# Patient Record
Sex: FEMALE | Race: AMERICAN INDIAN OR ALASKA NATIVE | HISPANIC OR LATINO | Employment: UNEMPLOYED | URBAN - METROPOLITAN AREA
[De-identification: names, ages, dates, MRNs, and addresses within clinical notes are randomized per-mention and may not be internally consistent; named-entity substitution may affect disease eponyms.]

---

## 2017-01-26 DIAGNOSIS — Z32.01 PREGNANCY TEST POSITIVE: Primary | ICD-10-CM

## 2019-04-15 ENCOUNTER — HOSPITAL ENCOUNTER (EMERGENCY)
Facility: CLINIC | Age: 29
Discharge: HOME | End: 2019-04-15
Attending: EMERGENCY MEDICINE | Admitting: EMERGENCY MEDICINE

## 2019-04-15 ENCOUNTER — APPOINTMENT (OUTPATIENT)
Dept: GENERAL RADIOLOGY | Facility: CLINIC | Age: 29
End: 2019-04-15
Attending: EMERGENCY MEDICINE

## 2019-04-15 VITALS
RESPIRATION RATE: 20 BRPM | OXYGEN SATURATION: 100 % | BODY MASS INDEX: 24.54 KG/M2 | HEIGHT: 60 IN | WEIGHT: 125 LBS | TEMPERATURE: 98.4 F | HEART RATE: 85 BPM | SYSTOLIC BLOOD PRESSURE: 109 MMHG | DIASTOLIC BLOOD PRESSURE: 67 MMHG

## 2019-04-15 DIAGNOSIS — M79.605 PAIN OF LEFT LOWER EXTREMITY: ICD-10-CM

## 2019-04-15 DIAGNOSIS — M05.79 RHEUMATOID ARTHRITIS INVOLVING MULTIPLE SITES WITH POSITIVE RHEUMATOID FACTOR (H): ICD-10-CM

## 2019-04-15 DIAGNOSIS — M25.552 HIP PAIN, LEFT: ICD-10-CM

## 2019-04-15 LAB
ANION GAP SERPL CALCULATED.3IONS-SCNC: 6 MMOL/L (ref 3–14)
BASOPHILS # BLD AUTO: 0 10E9/L (ref 0–0.2)
BASOPHILS NFR BLD AUTO: 0.3 %
BUN SERPL-MCNC: 14 MG/DL (ref 7–30)
CALCIUM SERPL-MCNC: 9.4 MG/DL (ref 8.5–10.1)
CHLORIDE SERPL-SCNC: 108 MMOL/L (ref 94–109)
CO2 SERPL-SCNC: 25 MMOL/L (ref 20–32)
CREAT SERPL-MCNC: 0.53 MG/DL (ref 0.52–1.04)
CRP SERPL-MCNC: 47.2 MG/L (ref 0–8)
DIFFERENTIAL METHOD BLD: NORMAL
EOSINOPHIL # BLD AUTO: 0 10E9/L (ref 0–0.7)
EOSINOPHIL NFR BLD AUTO: 0.6 %
ERYTHROCYTE [DISTWIDTH] IN BLOOD BY AUTOMATED COUNT: 12.3 % (ref 10–15)
ERYTHROCYTE [SEDIMENTATION RATE] IN BLOOD BY WESTERGREN METHOD: 65 MM/H (ref 0–20)
GFR SERPL CREATININE-BSD FRML MDRD: >90 ML/MIN/{1.73_M2}
GLUCOSE SERPL-MCNC: 118 MG/DL (ref 70–99)
HCT VFR BLD AUTO: 38.5 % (ref 35–47)
HGB BLD-MCNC: 12.8 G/DL (ref 11.7–15.7)
IMM GRANULOCYTES # BLD: 0 10E9/L (ref 0–0.4)
IMM GRANULOCYTES NFR BLD: 0.2 %
LYMPHOCYTES # BLD AUTO: 1.6 10E9/L (ref 0.8–5.3)
LYMPHOCYTES NFR BLD AUTO: 25.7 %
MCH RBC QN AUTO: 29.6 PG (ref 26.5–33)
MCHC RBC AUTO-ENTMCNC: 33.2 G/DL (ref 31.5–36.5)
MCV RBC AUTO: 89 FL (ref 78–100)
MONOCYTES # BLD AUTO: 0.2 10E9/L (ref 0–1.3)
MONOCYTES NFR BLD AUTO: 3.6 %
NEUTROPHILS # BLD AUTO: 4.5 10E9/L (ref 1.6–8.3)
NEUTROPHILS NFR BLD AUTO: 69.6 %
NRBC # BLD AUTO: 0 10*3/UL
NRBC BLD AUTO-RTO: 0 /100
PLATELET # BLD AUTO: 322 10E9/L (ref 150–450)
POTASSIUM SERPL-SCNC: 3.9 MMOL/L (ref 3.4–5.3)
RBC # BLD AUTO: 4.32 10E12/L (ref 3.8–5.2)
SODIUM SERPL-SCNC: 139 MMOL/L (ref 133–144)
WBC # BLD AUTO: 6.4 10E9/L (ref 4–11)

## 2019-04-15 PROCEDURE — 86140 C-REACTIVE PROTEIN: CPT | Performed by: EMERGENCY MEDICINE

## 2019-04-15 PROCEDURE — 80048 BASIC METABOLIC PNL TOTAL CA: CPT | Performed by: EMERGENCY MEDICINE

## 2019-04-15 PROCEDURE — 73502 X-RAY EXAM HIP UNI 2-3 VIEWS: CPT

## 2019-04-15 PROCEDURE — 99285 EMERGENCY DEPT VISIT HI MDM: CPT

## 2019-04-15 PROCEDURE — 85025 COMPLETE CBC W/AUTO DIFF WBC: CPT | Performed by: EMERGENCY MEDICINE

## 2019-04-15 PROCEDURE — 25000132 ZZH RX MED GY IP 250 OP 250 PS 637: Performed by: EMERGENCY MEDICINE

## 2019-04-15 PROCEDURE — 85652 RBC SED RATE AUTOMATED: CPT | Performed by: EMERGENCY MEDICINE

## 2019-04-15 PROCEDURE — 71101 X-RAY EXAM UNILAT RIBS/CHEST: CPT | Mod: RT

## 2019-04-15 RX ORDER — METHYLPREDNISOLONE 4 MG
TABLET, DOSE PACK ORAL
Qty: 21 TABLET | Refills: 0 | Status: SHIPPED | OUTPATIENT
Start: 2019-04-15 | End: 2023-02-01

## 2019-04-15 RX ORDER — IBUPROFEN 600 MG/1
600 TABLET, FILM COATED ORAL ONCE
Status: COMPLETED | OUTPATIENT
Start: 2019-04-15 | End: 2019-04-15

## 2019-04-15 RX ORDER — IBUPROFEN 800 MG/1
800 TABLET, FILM COATED ORAL EVERY 8 HOURS PRN
Qty: 60 TABLET | Refills: 0 | Status: SHIPPED | OUTPATIENT
Start: 2019-04-15 | End: 2019-04-23

## 2019-04-15 RX ADMIN — IBUPROFEN 600 MG: 600 TABLET ORAL at 20:30

## 2019-04-15 ASSESSMENT — ENCOUNTER SYMPTOMS
CHILLS: 1
BACK PAIN: 1
FEVER: 0
MYALGIAS: 1
ARTHRALGIAS: 1

## 2019-04-15 ASSESSMENT — MIFFLIN-ST. JEOR: SCORE: 1228.5

## 2019-04-15 NOTE — ED AVS SNAPSHOT
Emergency Department  [address] [facility] [address]  Phone:  [phone]  Fax:  [phone]                                    Adelita Bosch   MRN: [id number]    Department:   Emergency Department   Date of Visit:  4/15/2019           After Visit Summary Signature Page    I have received my discharge instructions, and my questions have been answered. I have discussed any challenges I see with this plan with the nurse or doctor.    ..........................................................................................................................................  Patient/Patient Representative Signature      ..........................................................................................................................................  Patient Representative Print Name and Relationship to Patient    ..................................................               ................................................  Date                                   Time    ..........................................................................................................................................  Reviewed by Signature/Title    ...................................................              ..............................................  Date                                               Time          22EPIC Rev 08/18

## 2019-04-15 NOTE — ED TRIAGE NOTES
Started a couple weeks ago. Hx of RA, pain getting worse, unable to ambulated States pain goes from left knee up into hip and lower back

## 2019-04-16 NOTE — ED PROVIDER NOTES
History     Chief Complaint:  Leg Pain     HPI   Adelita Bosch is a 28 year old female with a history of rheumatoid arthritis who presents with a male  to the Emergency Department today for evaluation of left leg pain for the last couple of weeks. Patient reports she initially attributed her pain to her rheumatoid arthritis and has been taking ibuprofen. The pain is worse today. Her leg is painful from her left knee to her left hip. The pain radiates into her lower back. She reports chills but has no fever. She denies injury or lifting with her back. She last took ibuprofen at 2 PM. She reports she has not seen her physician in a while because of insurance issues.     Allergies:  No known drug allergies    Medications:    Plaquenil  Prednisone  Folic acid  Methotrexate     Past Medical History:    Rheumatoid arthritis   Oligohydramnios  Sjogren's syndrome  TB lung  Osteoarthritis  Breast mass    Past Surgical History:     section    Family History:    Breast cancer    Social History:  Smoking Status: No  Smokeless Tobacco: N/A  Alcohol Use: Yes  Drug Use: No   Marital Status:   [2]     Review of Systems   Constitutional: Positive for chills. Negative for fever.   Musculoskeletal: Positive for arthralgias, back pain and myalgias.   All other systems reviewed and are negative.      Physical Exam   Vitals:  Patient Vitals for the past 24 hrs:   BP Temp Temp src Pulse Heart Rate Resp SpO2 Height Weight   04/15/19 2030 109/67 -- -- 76 -- -- 94 % -- --   04/15/19 1804 111/63 98.4  F (36.9  C) Oral -- 98 16 99 % 1.524 m (5') 56.7 kg (125 lb)      Physical Exam  General: Patient is alert and normal appearing.  HEENT: Head atraumatic    Eyes: pupils equal and reactive. Conjunctiva clear   Nares: patent   Oropharynx: no lesions, uvula midline, no palatal draping, normal voice, no trismus  Neck: Supple without lymphadenopathy, no meningismus  Chest: Heart regular rate and rhythm.   Lungs: Equal  clear to auscultation with no wheeze or rales  Abdomen: Soft, non tender, nondistended, normal bowel sounds  Back: No costovertebral angle tenderness, no midline C, T or L spine tenderness  Neuro: Grossly nonfocal, normal speech, strength equal bilaterally, CN 2-12 intact  Extremities: No deformities, equal radial and DP pulses. No clubbing, cyanosis.  No edema.  Left hip tenderness to palpation, no erythema or warmth, pain with left leg log roll.  Left leg is not swollen, not erythematous, no tenderness of her calf.  Compartments are soft.  Left foot is warm and well perfused with 2+ DP and PT pulse.  No midline lumbar tenderness to palpation or SI joint tenderness to palpation.  Skin: Warm and dry with no rash.       Emergency Department Course     Imaging:  Radiology findings were communicated with the patient who voiced understanding of the findings.  XR Pelvis with Hip Left 1 View  IMPRESSION:   1. No fractures are identified.  2. Joint space narrowing left hip joint consistent with degenerative  change. As read by radiology.  Ribs XR, unilat 3 views + PA chest, right  IMPRESSION: No rib fracture demonstrated. As read by radiology.    Laboratory:  Laboratory findings were communicated with the patient who voiced understanding of the findings.  CBC: WNL (WBC 6.4, HGB 12.8, )  BMP: Glucose 118 (H) o/w WNL (Creatinine 0.53)    CRP inflammation: 47.2 (H)  Erythrocyte sedimentation rate: 65 (H)    Interventions:  2030: Ibuprofen 600 MG PO     Emergency Department Course:  Past medical records, nursing notes, and vitals reviewed.  2020: I performed an exam of the patient and obtained history, as documented above.    IV inserted and blood drawn.    The patient was sent for a pelvis/left hip x-ray and ribs x-ray while in the emergency department, findings above.    2115: I rechecked the patient. Explained findings to the patient.    Findings and plan explained to the Patient. Patient discharged home with  instructions regarding supportive care, medications, and reasons to return. The importance of close follow-up was reviewed.     Impression & Plan      Medical Decision Making:  Patient is a 28-year-old female with history of rheumatoid arthritis off treatment for many months.  She has had increasing left hip pain radiating to her left knee over the last several days.  Will improve with ibuprofen or Aleve but then recurs.  She admitted chills but denied fever.  Here she is hemodynamically stable and afebrile.  There is no signs or symptoms to suggest infection or septic joint at this time.  Her ESR and CRP are elevated but I feel this is likely due to her rheumatoid arthritis and not septic joint.  X-ray reveals degenerative changes of her left hip.  She also complained of some right rib tenderness but there is no fracture seen on x-ray.  She has no signs or symptoms to suggest PE including no pleurisy.  She has reproducible tenderness over the right ribs.  No right upper quadrant or abdominal tenderness to palpation.  Patient denied dysuria, urgency, frequency and I do not suspect urinary tract infection or kidney stone as a cause of her symptoms.  Do not feel the patient will be appropriate to tap her joint at this time.  Recommend follow-up with rheumatology and she is getting insurance put back into Place May first.  I have given her follow-up information and told her to call to get an appointment as it may take several weeks.  In addition we will place her on a Medrol Dosepak to help with inflammation and arthritis.  I have asked her to use scheduled ibuprofen as well.  Return precautions the emergency department were reviewed at length and all questions and concerns addressed.    Diagnosis:    ICD-10-CM   1. Hip pain, left M25.552   2. Pain of left lower extremity M79.605   3. Rheumatoid arthritis involving multiple sites with positive rheumatoid factor (H) M05.79        Disposition:   Discharged to  home    Discharge Medications:       Medication List      Started    ibuprofen 800 MG tablet  Commonly known as:  ADVIL/MOTRIN  800 mg, Oral, EVERY 8 HOURS PRN     methylPREDNISolone 4 MG tablet therapy pack  Commonly known as:  MEDROL DOSEPAK  Follow Package Directions            4/15/2019    EMERGENCY DEPARTMENT  Scribe Disclosure:  I, Theresa Hinds, am serving as a scribe at 8:20 PM on 4/15/2019 to document services personally performed by Juliet Partida MD based on my observations and the provider's statements to me.        Juliet Partida MD  04/15/19 5440

## 2021-03-16 ENCOUNTER — TRANSFERRED RECORDS (OUTPATIENT)
Dept: MULTI SPECIALTY CLINIC | Facility: CLINIC | Age: 31
End: 2021-03-16

## 2021-03-16 LAB — PAP-ABSTRACT: NORMAL

## 2022-02-22 ENCOUNTER — HOSPITAL ENCOUNTER (EMERGENCY)
Facility: CLINIC | Age: 32
Discharge: HOME | End: 2022-02-22
Attending: EMERGENCY MEDICINE | Admitting: EMERGENCY MEDICINE
Payer: MEDICAID

## 2022-02-22 VITALS
TEMPERATURE: 98 F | RESPIRATION RATE: 16 BRPM | BODY MASS INDEX: 27.77 KG/M2 | OXYGEN SATURATION: 98 % | HEART RATE: 65 BPM | DIASTOLIC BLOOD PRESSURE: 71 MMHG | WEIGHT: 142.2 LBS | SYSTOLIC BLOOD PRESSURE: 109 MMHG

## 2022-02-22 DIAGNOSIS — F41.0 ANXIETY ATTACK: ICD-10-CM

## 2022-02-22 DIAGNOSIS — R20.2 PARESTHESIAS: ICD-10-CM

## 2022-02-22 LAB
ANION GAP SERPL CALCULATED.3IONS-SCNC: 6 MMOL/L (ref 3–14)
ATRIAL RATE - MUSE: 86 BPM
BASOPHILS # BLD AUTO: 0.1 10E3/UL (ref 0–0.2)
BASOPHILS NFR BLD AUTO: 1 %
BUN SERPL-MCNC: 11 MG/DL (ref 7–30)
CALCIUM SERPL-MCNC: 9.2 MG/DL (ref 8.5–10.1)
CHLORIDE BLD-SCNC: 110 MMOL/L (ref 94–109)
CO2 SERPL-SCNC: 24 MMOL/L (ref 20–32)
CREAT SERPL-MCNC: 0.7 MG/DL (ref 0.52–1.04)
DIASTOLIC BLOOD PRESSURE - MUSE: NORMAL MMHG
EOSINOPHIL # BLD AUTO: 0.3 10E3/UL (ref 0–0.7)
EOSINOPHIL NFR BLD AUTO: 3 %
ERYTHROCYTE [DISTWIDTH] IN BLOOD BY AUTOMATED COUNT: 12.2 % (ref 10–15)
GFR SERPL CREATININE-BSD FRML MDRD: >90 ML/MIN/1.73M2
GLUCOSE BLD-MCNC: 102 MG/DL (ref 70–99)
GLUCOSE BLDC GLUCOMTR-MCNC: 100 MG/DL (ref 70–99)
HCT VFR BLD AUTO: 44.7 % (ref 35–47)
HGB BLD-MCNC: 15.5 G/DL (ref 11.7–15.7)
IMM GRANULOCYTES # BLD: 0 10E3/UL
IMM GRANULOCYTES NFR BLD: 0 %
INTERPRETATION ECG - MUSE: NORMAL
LYMPHOCYTES # BLD AUTO: 4 10E3/UL (ref 0.8–5.3)
LYMPHOCYTES NFR BLD AUTO: 42 %
MAGNESIUM SERPL-MCNC: 2.2 MG/DL (ref 1.6–2.3)
MCH RBC QN AUTO: 33.3 PG (ref 26.5–33)
MCHC RBC AUTO-ENTMCNC: 34.7 G/DL (ref 31.5–36.5)
MCV RBC AUTO: 96 FL (ref 78–100)
MONOCYTES # BLD AUTO: 0.7 10E3/UL (ref 0–1.3)
MONOCYTES NFR BLD AUTO: 7 %
NEUTROPHILS # BLD AUTO: 4.5 10E3/UL (ref 1.6–8.3)
NEUTROPHILS NFR BLD AUTO: 47 %
NRBC # BLD AUTO: 0 10E3/UL
NRBC BLD AUTO-RTO: 0 /100
P AXIS - MUSE: 57 DEGREES
PLATELET # BLD AUTO: 182 10E3/UL (ref 150–450)
POTASSIUM BLD-SCNC: 3.4 MMOL/L (ref 3.4–5.3)
PR INTERVAL - MUSE: 152 MS
QRS DURATION - MUSE: 70 MS
QT - MUSE: 368 MS
QTC - MUSE: 440 MS
R AXIS - MUSE: 47 DEGREES
RBC # BLD AUTO: 4.66 10E6/UL (ref 3.8–5.2)
SODIUM SERPL-SCNC: 140 MMOL/L (ref 133–144)
SYSTOLIC BLOOD PRESSURE - MUSE: NORMAL MMHG
T AXIS - MUSE: 33 DEGREES
VENTRICULAR RATE- MUSE: 86 BPM
WBC # BLD AUTO: 9.5 10E3/UL (ref 4–11)

## 2022-02-22 PROCEDURE — 96374 THER/PROPH/DIAG INJ IV PUSH: CPT

## 2022-02-22 PROCEDURE — 83735 ASSAY OF MAGNESIUM: CPT | Performed by: EMERGENCY MEDICINE

## 2022-02-22 PROCEDURE — 99285 EMERGENCY DEPT VISIT HI MDM: CPT | Mod: 25

## 2022-02-22 PROCEDURE — 93005 ELECTROCARDIOGRAM TRACING: CPT

## 2022-02-22 PROCEDURE — 250N000011 HC RX IP 250 OP 636: Performed by: EMERGENCY MEDICINE

## 2022-02-22 PROCEDURE — 36415 COLL VENOUS BLD VENIPUNCTURE: CPT | Performed by: EMERGENCY MEDICINE

## 2022-02-22 PROCEDURE — 80048 BASIC METABOLIC PNL TOTAL CA: CPT | Performed by: EMERGENCY MEDICINE

## 2022-02-22 PROCEDURE — 85025 COMPLETE CBC W/AUTO DIFF WBC: CPT | Performed by: EMERGENCY MEDICINE

## 2022-02-22 RX ORDER — HYDROXYZINE HYDROCHLORIDE 25 MG/1
25-50 TABLET, FILM COATED ORAL 3 TIMES DAILY PRN
Qty: 20 TABLET | Refills: 0 | Status: SHIPPED | OUTPATIENT
Start: 2022-02-22 | End: 2023-02-01

## 2022-02-22 RX ORDER — LORAZEPAM 2 MG/ML
1 INJECTION INTRAMUSCULAR ONCE
Status: COMPLETED | OUTPATIENT
Start: 2022-02-22 | End: 2022-02-22

## 2022-02-22 RX ADMIN — LORAZEPAM 1 MG: 2 INJECTION INTRAMUSCULAR; INTRAVENOUS at 02:58

## 2022-02-22 ASSESSMENT — ENCOUNTER SYMPTOMS
ARTHRALGIAS: 0
SHORTNESS OF BREATH: 1
LIGHT-HEADEDNESS: 1
WEAKNESS: 1
MYALGIAS: 0
NUMBNESS: 1
TREMORS: 1

## 2022-02-22 NOTE — ED TRIAGE NOTES
Pt arrives to the ED due to numbness that started in right hand around 2300. Pt states that then when she was trying to sleep she felt tingling/numbness in both sides of face. Pt states her legs also feel wobbly/shaky. Pt AOx4. Denies headache.

## 2022-02-22 NOTE — ED PROVIDER NOTES
History   Chief Complaint:  Numbness     The history is provided by the patient.      Adelita Bosch is a 31 year old female with history of rheumatoid arthritis who presents with numbness. Patient developed numbness to both sides of her face. It initially started around 2300 in her right hand that radiated up to her elbow. When she lay down to sleep, her head and face became numb. She notes numbness in her legs and feet as well. She feels lightheaded and short of breath. No history of similar presentation. Denies any pain. No recent medication changes.     Review of Systems   Respiratory: Positive for shortness of breath.    Musculoskeletal: Negative for arthralgias and myalgias.   Neurological: Positive for tremors, weakness, light-headedness and numbness.   All other systems reviewed and are negative.    Allergies:  The patient has no known allergies.     Medications:  Prilosec  Humira   Prednisone   Plaquenil     Past Medical History:     Latent tuberculosis  Rheumatoid arthritis   Breast mass   Sjogren's syndrome     Past Surgical History:     section     Family History:    Mother: breast cancer    Social History:  Presents to ED with visitor     Physical Exam     Patient Vitals for the past 24 hrs:   BP Temp Temp src Pulse Resp SpO2 Weight   22 0238 132/82 98  F (36.7  C) Temporal 96 18 100 % 64.5 kg (142 lb 3.2 oz)       Physical Exam  Nursing note and vitals reviewed.  HENT:   Mouth/Throat: Moist mucous membranes.   Eyes: nonicteric sclera  Cardiovascular: Normal rate, regular rhythm, no murmurs, rubs, or gallops  Pulmonary/Chest: Effort normal and breath sounds normal. No respiratory distress. No wheezes. No rales.   Abdominal: Soft. Nontender, nondistended, no guarding or rigidity.   Musculoskeletal: Normal range of motion.   Neurological: Alert. Moves all extremities spontaneously. Pt tremulous.    CN's II-XII intact. 5/5 BUE and BLE strength. PERRL    EOMI without nystagmus.       Sensation intact to light touch.   Skin: Skin is warm and dry. No rash noted.   Psychiatric: Appears anxious, hyperventilating. Congruent affect.    Emergency Department Course   ECG  ECG obtained at 0243, ECG read at 0253  Normal sinus rhythm  Poor image quality due to tremors   Rate 86 bpm. NY interval 152 ms. QRS duration 70 ms. QT/QTc 368/440 ms. P-R-T axes 57 47 33.     Laboratory:  Labs Ordered and Resulted from Time of ED Arrival to Time of ED Departure   GLUCOSE BY METER - Abnormal       Result Value    GLUCOSE BY METER POCT 100 (*)    BASIC METABOLIC PANEL - Abnormal    Sodium 140      Potassium 3.4      Chloride 110 (*)     Carbon Dioxide (CO2) 24      Anion Gap 6      Urea Nitrogen 11      Creatinine 0.70      Calcium 9.2      Glucose 102 (*)     GFR Estimate >90     CBC WITH PLATELETS AND DIFFERENTIAL - Abnormal    WBC Count 9.5      RBC Count 4.66      Hemoglobin 15.5      Hematocrit 44.7      MCV 96      MCH 33.3 (*)     MCHC 34.7      RDW 12.2      Platelet Count 182      % Neutrophils 47      % Lymphocytes 42      % Monocytes 7      % Eosinophils 3      % Basophils 1      % Immature Granulocytes 0      NRBCs per 100 WBC 0      Absolute Neutrophils 4.5      Absolute Lymphocytes 4.0      Absolute Monocytes 0.7      Absolute Eosinophils 0.3      Absolute Basophils 0.1      Absolute Immature Granulocytes 0.0      Absolute NRBCs 0.0     MAGNESIUM - Normal    Magnesium 2.2        Emergency Department Course:     Reviewed:  I reviewed nursing notes, vitals, past medical history and Care Everywhere    Assessments:  0241 I obtained history and examined the patient as noted above.   0515 I rechecked the patient and explained findings.     Interventions:  0258 Ativan 1 mg IV     Disposition:  The patient was discharged to home.     Impression & Plan       Medical Decision Making:  Pt presents with CC paresthesias/full body numbness. She is shaking and hyperventilating on exam. While this was consistent with  an anxiety attack, I did consider other etiologies such as PE, MS, CVA, among others. Workup is negative. Pt is PERC negative, therefore no further PE workup indicated. After dose of ativan, pt notes her symptoms are completely improved. Chart review suggested a similar presentation about 1 year prior. Pt reports somewhat frequent symptoms that certainly could be related to anxiety. She was amenable to trying hydroxyzine. I encouraged her to schedule with her primary care physician for further evaluation/treatment in about a week - sooner if worsening symptoms. She is in stable condition at the time of discharge, indications for return to the ED were discussed as well as follow up. All questions were answered and she is in agreement with the plan.       Diagnosis:    ICD-10-CM    1. Paresthesias  R20.2    2. Anxiety attack  F41.0        Discharge Medications:  New Prescriptions    HYDROXYZINE (ATARAX) 25 MG TABLET    Take 1-2 tablets (25-50 mg) by mouth 3 times daily as needed for anxiety       Scribe Disclosure:  I, Alberto Clifton, am serving as a scribe at 2:39 AM on 2/22/2022 to document services personally performed by Garfield Marx MD based on my observations and the provider's statements to me.            Garfield Marx MD  02/23/22 0031

## 2022-02-22 NOTE — DISCHARGE INSTRUCTIONS
Your symptoms tonight were very likely due to an anxiety attack. Nevertheless, you should follow-up with your primary care doctor this week for further discussion of your symptoms and any further testing they recommend.     Return to emergency department for chest pain, shortness of breath, loss of consciousness/passing out, or for any other concerns.

## 2023-01-15 ENCOUNTER — APPOINTMENT (OUTPATIENT)
Dept: CT IMAGING | Facility: CLINIC | Age: 33
End: 2023-01-15
Attending: EMERGENCY MEDICINE
Payer: MEDICAID

## 2023-01-15 ENCOUNTER — APPOINTMENT (OUTPATIENT)
Dept: GENERAL RADIOLOGY | Facility: CLINIC | Age: 33
End: 2023-01-15
Attending: EMERGENCY MEDICINE
Payer: MEDICAID

## 2023-01-15 ENCOUNTER — HOSPITAL ENCOUNTER (EMERGENCY)
Facility: CLINIC | Age: 33
Discharge: HOME | End: 2023-01-15
Attending: EMERGENCY MEDICINE | Admitting: EMERGENCY MEDICINE
Payer: MEDICAID

## 2023-01-15 VITALS
SYSTOLIC BLOOD PRESSURE: 101 MMHG | RESPIRATION RATE: 11 BRPM | DIASTOLIC BLOOD PRESSURE: 66 MMHG | OXYGEN SATURATION: 98 % | HEART RATE: 105 BPM | HEIGHT: 60 IN | BODY MASS INDEX: 26.11 KG/M2 | TEMPERATURE: 100.6 F | WEIGHT: 133 LBS

## 2023-01-15 DIAGNOSIS — R50.9 FEVER, UNSPECIFIED FEVER CAUSE: ICD-10-CM

## 2023-01-15 DIAGNOSIS — J03.90 TONSILLITIS: ICD-10-CM

## 2023-01-15 DIAGNOSIS — Z86.73 CHRONIC CEREBROVASCULAR ACCIDENT: ICD-10-CM

## 2023-01-15 DIAGNOSIS — I63.9 CEREBELLAR INFARCT (H): ICD-10-CM

## 2023-01-15 LAB
ALBUMIN SERPL-MCNC: 4.5 G/DL (ref 3.4–5)
ALBUMIN UR-MCNC: NEGATIVE MG/DL
ALP SERPL-CCNC: 70 U/L (ref 40–150)
ALT SERPL W P-5'-P-CCNC: 16 U/L (ref 0–50)
ANION GAP SERPL CALCULATED.3IONS-SCNC: 6 MMOL/L (ref 3–14)
APPEARANCE UR: CLEAR
AST SERPL W P-5'-P-CCNC: 12 U/L (ref 0–45)
BACTERIA #/AREA URNS HPF: ABNORMAL /HPF
BASOPHILS # BLD AUTO: 0.1 10E3/UL (ref 0–0.2)
BASOPHILS NFR BLD AUTO: 0 %
BILIRUB SERPL-MCNC: 2 MG/DL (ref 0.2–1.3)
BILIRUB UR QL STRIP: NEGATIVE
BUN SERPL-MCNC: 5 MG/DL (ref 7–30)
CALCIUM SERPL-MCNC: 9.3 MG/DL (ref 8.5–10.1)
CHLORIDE BLD-SCNC: 102 MMOL/L (ref 94–109)
CO2 SERPL-SCNC: 24 MMOL/L (ref 20–32)
COLOR UR AUTO: ABNORMAL
CREAT SERPL-MCNC: 0.67 MG/DL (ref 0.52–1.04)
D DIMER PPP FEU-MCNC: <0.27 UG/ML FEU (ref 0–0.5)
DEPRECATED S PYO AG THROAT QL EIA: NEGATIVE
EOSINOPHIL # BLD AUTO: 0 10E3/UL (ref 0–0.7)
EOSINOPHIL NFR BLD AUTO: 0 %
ERYTHROCYTE [DISTWIDTH] IN BLOOD BY AUTOMATED COUNT: 12.1 % (ref 10–15)
FLUAV AG SPEC QL IA: NEGATIVE
FLUBV AG SPEC QL IA: NEGATIVE
GFR SERPL CREATININE-BSD FRML MDRD: >90 ML/MIN/1.73M2
GLUCOSE BLD-MCNC: 109 MG/DL (ref 70–99)
GLUCOSE UR STRIP-MCNC: NEGATIVE MG/DL
GROUP A STREP BY PCR: NOT DETECTED
HCG SERPL QL: NEGATIVE
HCO3 BLDV-SCNC: 21 MMOL/L (ref 21–28)
HCO3 BLDV-SCNC: 24 MMOL/L (ref 21–28)
HCT VFR BLD AUTO: 41.7 % (ref 35–47)
HGB BLD-MCNC: 14.8 G/DL (ref 11.7–15.7)
HGB UR QL STRIP: NEGATIVE
HOLD SPECIMEN: NORMAL
IMM GRANULOCYTES # BLD: 0.1 10E3/UL
IMM GRANULOCYTES NFR BLD: 1 %
KETONES UR STRIP-MCNC: NEGATIVE MG/DL
LACTATE BLD-SCNC: 1.6 MMOL/L
LACTATE BLD-SCNC: 1.7 MMOL/L
LEUKOCYTE ESTERASE UR QL STRIP: NEGATIVE
LIPASE SERPL-CCNC: 76 U/L (ref 73–393)
LYMPHOCYTES # BLD AUTO: 1.4 10E3/UL (ref 0.8–5.3)
LYMPHOCYTES NFR BLD AUTO: 8 %
MCH RBC QN AUTO: 32.2 PG (ref 26.5–33)
MCHC RBC AUTO-ENTMCNC: 35.5 G/DL (ref 31.5–36.5)
MCV RBC AUTO: 91 FL (ref 78–100)
MONOCYTES # BLD AUTO: 1.5 10E3/UL (ref 0–1.3)
MONOCYTES NFR BLD AUTO: 8 %
MONOCYTES NFR BLD AUTO: NEGATIVE %
MUCOUS THREADS #/AREA URNS LPF: PRESENT /LPF
NEUTROPHILS # BLD AUTO: 14.8 10E3/UL (ref 1.6–8.3)
NEUTROPHILS NFR BLD AUTO: 83 %
NITRATE UR QL: NEGATIVE
NRBC # BLD AUTO: 0 10E3/UL
NRBC BLD AUTO-RTO: 0 /100
PCO2 BLDV: 36 MM HG (ref 40–50)
PCO2 BLDV: 38 MM HG (ref 40–50)
PH BLDV: 7.37 [PH] (ref 7.32–7.43)
PH BLDV: 7.42 [PH] (ref 7.32–7.43)
PH UR STRIP: 8 [PH] (ref 5–7)
PLATELET # BLD AUTO: 165 10E3/UL (ref 150–450)
PO2 BLDV: 27 MM HG (ref 25–47)
PO2 BLDV: 52 MM HG (ref 25–47)
POTASSIUM BLD-SCNC: 3.8 MMOL/L (ref 3.4–5.3)
PROT SERPL-MCNC: 9 G/DL (ref 6.8–8.8)
RBC # BLD AUTO: 4.59 10E6/UL (ref 3.8–5.2)
RBC URINE: 0 /HPF
SAO2 % BLDV: 51 % (ref 94–100)
SAO2 % BLDV: 86 % (ref 94–100)
SARS-COV-2 RNA RESP QL NAA+PROBE: NEGATIVE
SODIUM SERPL-SCNC: 132 MMOL/L (ref 133–144)
SP GR UR STRIP: 1 (ref 1–1.03)
SQUAMOUS EPITHELIAL: <1 /HPF
UROBILINOGEN UR STRIP-MCNC: NORMAL MG/DL
WBC # BLD AUTO: 17.8 10E3/UL (ref 4–11)
WBC URINE: 0 /HPF

## 2023-01-15 PROCEDURE — 96375 TX/PRO/DX INJ NEW DRUG ADDON: CPT

## 2023-01-15 PROCEDURE — C9803 HOPD COVID-19 SPEC COLLECT: HCPCS

## 2023-01-15 PROCEDURE — 96361 HYDRATE IV INFUSION ADD-ON: CPT

## 2023-01-15 PROCEDURE — 87804 INFLUENZA ASSAY W/OPTIC: CPT | Performed by: EMERGENCY MEDICINE

## 2023-01-15 PROCEDURE — 96376 TX/PRO/DX INJ SAME DRUG ADON: CPT

## 2023-01-15 PROCEDURE — 93005 ELECTROCARDIOGRAM TRACING: CPT

## 2023-01-15 PROCEDURE — 83690 ASSAY OF LIPASE: CPT | Performed by: EMERGENCY MEDICINE

## 2023-01-15 PROCEDURE — 70450 CT HEAD/BRAIN W/O DYE: CPT

## 2023-01-15 PROCEDURE — 99285 EMERGENCY DEPT VISIT HI MDM: CPT | Mod: 25,CS

## 2023-01-15 PROCEDURE — U0003 INFECTIOUS AGENT DETECTION BY NUCLEIC ACID (DNA OR RNA); SEVERE ACUTE RESPIRATORY SYNDROME CORONAVIRUS 2 (SARS-COV-2) (CORONAVIRUS DISEASE [COVID-19]), AMPLIFIED PROBE TECHNIQUE, MAKING USE OF HIGH THROUGHPUT TECHNOLOGIES AS DESCRIBED BY CMS-2020-01-R: HCPCS | Performed by: EMERGENCY MEDICINE

## 2023-01-15 PROCEDURE — 81001 URINALYSIS AUTO W/SCOPE: CPT | Performed by: EMERGENCY MEDICINE

## 2023-01-15 PROCEDURE — 36415 COLL VENOUS BLD VENIPUNCTURE: CPT | Performed by: EMERGENCY MEDICINE

## 2023-01-15 PROCEDURE — 85379 FIBRIN DEGRADATION QUANT: CPT | Performed by: EMERGENCY MEDICINE

## 2023-01-15 PROCEDURE — 250N000013 HC RX MED GY IP 250 OP 250 PS 637: Performed by: EMERGENCY MEDICINE

## 2023-01-15 PROCEDURE — 85004 AUTOMATED DIFF WBC COUNT: CPT | Performed by: EMERGENCY MEDICINE

## 2023-01-15 PROCEDURE — 86308 HETEROPHILE ANTIBODY SCREEN: CPT | Performed by: EMERGENCY MEDICINE

## 2023-01-15 PROCEDURE — 96365 THER/PROPH/DIAG IV INF INIT: CPT

## 2023-01-15 PROCEDURE — 80053 COMPREHEN METABOLIC PANEL: CPT | Performed by: EMERGENCY MEDICINE

## 2023-01-15 PROCEDURE — 87651 STREP A DNA AMP PROBE: CPT | Performed by: EMERGENCY MEDICINE

## 2023-01-15 PROCEDURE — 96366 THER/PROPH/DIAG IV INF ADDON: CPT

## 2023-01-15 PROCEDURE — 96367 TX/PROPH/DG ADDL SEQ IV INF: CPT

## 2023-01-15 PROCEDURE — 84703 CHORIONIC GONADOTROPIN ASSAY: CPT | Performed by: EMERGENCY MEDICINE

## 2023-01-15 PROCEDURE — 82803 BLOOD GASES ANY COMBINATION: CPT

## 2023-01-15 PROCEDURE — 71046 X-RAY EXAM CHEST 2 VIEWS: CPT

## 2023-01-15 PROCEDURE — 258N000003 HC RX IP 258 OP 636: Performed by: EMERGENCY MEDICINE

## 2023-01-15 PROCEDURE — 250N000011 HC RX IP 250 OP 636: Performed by: EMERGENCY MEDICINE

## 2023-01-15 RX ORDER — ACETAMINOPHEN 500 MG
1000 TABLET ORAL ONCE
Status: COMPLETED | OUTPATIENT
Start: 2023-01-15 | End: 2023-01-15

## 2023-01-15 RX ORDER — DIPHENHYDRAMINE HYDROCHLORIDE 50 MG/ML
25 INJECTION INTRAMUSCULAR; INTRAVENOUS ONCE
Status: COMPLETED | OUTPATIENT
Start: 2023-01-15 | End: 2023-01-15

## 2023-01-15 RX ORDER — PIPERACILLIN SODIUM, TAZOBACTAM SODIUM 4; .5 G/20ML; G/20ML
4.5 INJECTION, POWDER, LYOPHILIZED, FOR SOLUTION INTRAVENOUS ONCE
Status: COMPLETED | OUTPATIENT
Start: 2023-01-15 | End: 2023-01-15

## 2023-01-15 RX ADMIN — SODIUM CHLORIDE, POTASSIUM CHLORIDE, SODIUM LACTATE AND CALCIUM CHLORIDE 2000 ML: 600; 310; 30; 20 INJECTION, SOLUTION INTRAVENOUS at 14:48

## 2023-01-15 RX ADMIN — DIPHENHYDRAMINE HYDROCHLORIDE 25 MG: 50 INJECTION, SOLUTION INTRAMUSCULAR; INTRAVENOUS at 14:49

## 2023-01-15 RX ADMIN — DIPHENHYDRAMINE HYDROCHLORIDE 25 MG: 50 INJECTION, SOLUTION INTRAMUSCULAR; INTRAVENOUS at 20:18

## 2023-01-15 RX ADMIN — PROCHLORPERAZINE EDISYLATE 10 MG: 5 INJECTION INTRAMUSCULAR; INTRAVENOUS at 14:51

## 2023-01-15 RX ADMIN — ACETAMINOPHEN 1000 MG: 500 TABLET ORAL at 14:46

## 2023-01-15 RX ADMIN — ACETAMINOPHEN 1000 MG: 500 TABLET ORAL at 21:26

## 2023-01-15 RX ADMIN — SODIUM CHLORIDE, POTASSIUM CHLORIDE, SODIUM LACTATE AND CALCIUM CHLORIDE 1000 ML: 600; 310; 30; 20 INJECTION, SOLUTION INTRAVENOUS at 16:49

## 2023-01-15 RX ADMIN — VANCOMYCIN HYDROCHLORIDE 1500 MG: 10 INJECTION, POWDER, LYOPHILIZED, FOR SOLUTION INTRAVENOUS at 18:21

## 2023-01-15 RX ADMIN — PIPERACILLIN AND TAZOBACTAM 4.5 G: 4; .5 INJECTION, POWDER, FOR SOLUTION INTRAVENOUS at 16:40

## 2023-01-15 ASSESSMENT — ENCOUNTER SYMPTOMS
NUMBNESS: 1
TREMORS: 1
RHINORRHEA: 0
VOMITING: 0
DIARRHEA: 0
DYSURIA: 0

## 2023-01-15 ASSESSMENT — ACTIVITIES OF DAILY LIVING (ADL)
ADLS_ACUITY_SCORE: 35

## 2023-01-15 NOTE — ED PROVIDER NOTES
History     Chief Complaint:  Tremors, Fever, and Palpitations       The history is provided by the patient.      Adelita Bosch is a 32 year old female who presents via EMS with tremors. Patient states that she has had 1 month of tremors and paraesthesias to the hands and face. 4 days ago, she had these symptoms and was seen at her clinic. It was determined that she had an adverse reaction to Plaquenil and was advised to follow up with her rheumatologist. She was also prescribed sertraline but she states she has not been taking this.  Yesterday, her symptoms recurred along with a headache, myalgias, and nausea. Today, she developed full body tremors, prompting her to be brought to the ED. She notes history of tuberculosis 9 months ago, which she was treated successfully for, as well as history of rheumatoid arthritis. She denies recent travel. She does consume alcohol but her last drink was 5 months ago. She denies rhinorrhea, diarrhea, dysuria, rash, vomiting, and leg swelling.      Independent Historian: yes     Review of External Notes: see MDM below     ROS:  Review of Systems   HENT: Negative for rhinorrhea.    Cardiovascular: Negative for leg swelling.   Gastrointestinal: Negative for diarrhea and vomiting.   Genitourinary: Negative for dysuria.   Skin: Negative for rash.   Neurological: Positive for tremors and numbness.   All other systems reviewed and are negative.      Allergies:  No Known Allergies     Medications:    Hydroxyzine  Methylprednisolone     Past Medical History:    Rheumatoid arthritis  Sjogren's syndrome  Tuberculosis     Past Surgical History:     section     Family History:    Tuberculosis   Breast cancer     Social History:  Occasional alcohol use  The patient presents via EMS     Physical Exam     Patient Vitals for the past 24 hrs:   BP Temp Temp src Pulse Resp SpO2 Height Weight   01/15/23 2015 105/66 98.1  F (36.7  C) Oral 97 15 99 % -- --   01/15/23 2006 90/53 -- --  98 12 92 % -- --   01/15/23 2001 (!) 88/60 -- -- 101 (!) 9 98 % -- --   01/15/23 1941 90/58 -- -- 98 13 99 % -- --   01/15/23 1921 (!) 89/60 -- -- 97 13 99 % -- --   01/15/23 1859 93/62 -- -- 107 24 99 % -- --   01/15/23 1854 93/58 -- -- 98 17 98 % -- --   01/15/23 1803 (!) 87/75 -- -- -- -- 98 % -- --   01/15/23 1758 96/45 -- -- 117 18 99 % -- --   01/15/23 1736 (!) 82/47 -- -- 112 19 98 % -- --   01/15/23 1731 (!) 85/46 -- -- 104 18 99 % -- --   01/15/23 1702 91/52 -- -- 98 -- 98 % -- --   01/15/23 1632 (!) 89/55 -- -- 100 18 97 % -- --   01/15/23 1603 95/54 99.1  F (37.3  C) Oral 118 28 98 % -- --   01/15/23 1558 91/63 -- -- 118 13 97 % -- --   01/15/23 1507 -- -- -- 115 17 97 % -- --   01/15/23 1506 -- -- -- 116 20 98 % -- --   01/15/23 1505 -- -- -- 118 20 97 % -- --   01/15/23 1453 -- -- -- (!) 124 -- 100 % -- --   01/15/23 1443 -- -- -- -- -- 100 % -- --   01/15/23 1433 -- -- -- (!) 138 20 100 % -- --   01/15/23 1428 -- -- -- (!) 133 17 99 % -- --   01/15/23 1423 -- -- -- (!) 141 27 98 % -- --   01/15/23 1418 -- -- -- (!) 161 13 99 % -- --   01/15/23 1413 -- -- -- (!) 154 (!) 32 97 % -- --   01/15/23 1408 110/73 -- -- -- -- -- -- --   01/15/23 1402 124/68 (!) 100.7  F (38.2  C) Oral (!) 151 18 98 % 1.524 m (5') 60.3 kg (133 lb)        Physical Exam  Constitutional: Well developed, nontox appearance  Head: Atraumatic.   Mouth/Throat: Oropharynx is moist, bilateral tonsillar erythema and exudate  Neck:  no stridor, forage motion, no meningismus, no LAD  Eyes: no scleral icterus, EOMI  Cardiovascular: Regular tachycardia, 2+ bilat radial pulses  Pulmonary/Chest: nml resp effort, Clear BS bilat  Abdominal: ND, soft, NT, no rebound or guarding   : no CVA tenderness bilat  Ext: Warm, well perfused, no edema  Neurological: A&O, symmetric facies, moves ext x4  Skin: Skin is warm and dry.  Flushed  Psychiatric: Behavior is normal. Thought content normal.   Nursing note and vitals reviewed.    Emergency Department  Course     ECG  ECG taken at 1401, ECG read at 1402   Critical Test Result: High HR   Age and gender specific ECG analysis   Sinus tachycardia with short WA  Low voltage QRS  No STEMI  As compared to prior, dated 3/30/16.  Rate 158 bpm. WA interval 104 ms. QRS duration 58 ms. QT/QTc 294/476 ms. P-R-T axes 75 76 71.     Imaging:  CT Head w/o Contrast   Final Result   IMPRESSION:   1.  No acute intracranial process.   2.  Chronic lacunar infarct left cerebellum.         XR Chest 2 Views   Final Result   IMPRESSION: PA and lateral views of the chest were obtained. Cardiomediastinal silhouette is within normal limits. No suspicious focal pulmonary opacities. No significant pleural effusion or pneumothorax.                 Report per radiology    Laboratory:  Labs Ordered and Resulted from Time of ED Arrival to Time of ED Departure   COMPREHENSIVE METABOLIC PANEL - Abnormal       Result Value    Sodium 132 (*)     Potassium 3.8      Chloride 102      Carbon Dioxide (CO2) 24      Anion Gap 6      Urea Nitrogen 5 (*)     Creatinine 0.67      Calcium 9.3      Glucose 109 (*)     Alkaline Phosphatase 70      AST 12      ALT 16      Protein Total 9.0 (*)     Albumin 4.5      Bilirubin Total 2.0 (*)     GFR Estimate >90     CBC WITH PLATELETS AND DIFFERENTIAL - Abnormal    WBC Count 17.8 (*)     RBC Count 4.59      Hemoglobin 14.8      Hematocrit 41.7      MCV 91      MCH 32.2      MCHC 35.5      RDW 12.1      Platelet Count 165      % Neutrophils 83      % Lymphocytes 8      % Monocytes 8      % Eosinophils 0      % Basophils 0      % Immature Granulocytes 1      NRBCs per 100 WBC 0      Absolute Neutrophils 14.8 (*)     Absolute Lymphocytes 1.4      Absolute Monocytes 1.5 (*)     Absolute Eosinophils 0.0      Absolute Basophils 0.1      Absolute Immature Granulocytes 0.1      Absolute NRBCs 0.0     ROUTINE UA WITH MICROSCOPIC REFLEX TO CULTURE - Abnormal    Color Urine Straw      Appearance Urine Clear      Glucose Urine  Negative      Bilirubin Urine Negative      Ketones Urine Negative      Specific Gravity Urine 1.004      Blood Urine Negative      pH Urine 8.0 (*)     Protein Albumin Urine Negative      Urobilinogen Urine Normal      Nitrite Urine Negative      Leukocyte Esterase Urine Negative      Bacteria Urine Few (*)     Mucus Urine Present (*)     RBC Urine 0      WBC Urine 0      Squamous Epithelials Urine <1     ISTAT GASES LACTATE VENOUS POCT - Abnormal    Lactic Acid POCT 1.7      Bicarbonate Venous POCT 24      O2 Sat, Venous POCT 51 (*)     pCO2V Venous POCT 38 (*)     pH Venous POCT 7.42      pO2 Venous POCT 27     ISTAT GASES LACTATE VENOUS POCT - Abnormal    Lactic Acid POCT 1.6      Bicarbonate Venous POCT 21      O2 Sat, Venous POCT 86 (*)     pCO2V Venous POCT 36 (*)     pH Venous POCT 7.37      pO2 Venous POCT 52 (*)    COVID-19 VIRUS (CORONAVIRUS) BY PCR - Normal    SARS CoV2 PCR Negative     LIPASE - Normal    Lipase 76     HCG QUALITATIVE PREGNANCY - Normal    hCG Serum Qualitative Negative     MONONUCLEOSIS SCREEN - Normal    Mononucleosis Screen Negative     D DIMER QUANTITATIVE - Normal    D-Dimer Quantitative <0.27     STREPTOCOCCUS A RAPID SCREEN W REFELX TO PCR - Normal    Group A Strep antigen Negative     INFLUENZA A/B ANTIGEN - Normal    Influenza A antigen Negative      Influenza B antigen Negative     GROUP A STREPTOCOCCUS PCR THROAT SWAB - Normal    Group A strep by PCR Not Detected          Emergency Department Course & Assessments:    Interventions:  Medications   lactated ringers BOLUS 2,000 mL (0 mLs Intravenous Stopped 1/15/23 1612)   acetaminophen (TYLENOL) tablet 1,000 mg (1,000 mg Oral Given 1/15/23 1446)   prochlorperazine (COMPAZINE) injection 10 mg (10 mg Intravenous Given 1/15/23 1451)   diphenhydrAMINE (BENADRYL) injection 25 mg (25 mg Intravenous Given 1/15/23 1449)   lactated ringers BOLUS 1,000 mL (0 mLs Intravenous Stopped 1/15/23 1752)   piperacillin-tazobactam (ZOSYN) 4.5 g  vial to attach to  mL bag (0 g Intravenous Stopped 1/15/23 1752)   vancomycin (VANCOCIN) 1,500 mg in 0.9% NaCl 250 mL intermittent infusion (0 mg Intravenous Stopped 1/15/23 2009)   diphenhydrAMINE (BENADRYL) injection 25 mg (25 mg Intravenous Given 1/15/23 2018)        Independent Interpretation (X-rays, CTs, rhythm strip):  See MDM below       Social Determinants of Health affecting care:  See MDM below       Disposition:  The patient was discharged to home.     Impression & Plan      Medical Decision Makin year old female presenting w/ fever, tachycardia     Social determinants affecting patient's health include: History of latent TB, patient is a primary care clinic decreasing risk of adverse health outcomes     I reviewed medical records from family practice office visit on 2023 sepsis, severe sepsis, bacteremia     DDx includes viral syndrome NOS, influenza-like illness, influenza, COVID-19,, tonsillitis,, strep pharyngitis, UTI.  Meningitis seems less likely given consolation of symptoms.  Labs ordered as noted above significant for leukocytosis, negative viral studies, UA without infection.  Checks x-ray independently reviewed by myself no evidence of acute pneumonia.  CT head demonstrated a chronic lacunar infarct in cerebellum as described above.  Patient was monitored in the emergency department given fluids as noted above with episodes of transient hypotension.  Throughout her ED course however, the patient reports feeling significantly improved.  Given her tonsillitis and obvious source of infection, I think it be reasonable to treat the patient with Augmentin on outpatient basis.  Her blood pressure stabilized in the emergency department prior to discharge.  She is informed that she will be called should her blood cultures returned positive.  She is given strict return precautions regarding re-presentation to the ED. At this time I feel the pt is safe for discharge.  Recommendations  given regarding follow up with PCP and return to the emergency department as needed for new or worsening symptoms.  Pt counseled on all results, disposition and diagnosis.  They are understanding and agreeable to plan. Patient discharged in stable condition.      Diagnosis:    ICD-10-CM    1. Fever, unspecified fever cause  R50.9       2. Tonsillitis  J03.90       3. Chronic cerebrovascular accident  I69.30 Adult Neurology  Referral      4. Cerebellar infarct (H)  I63.9 Adult Neurology  Referral           Discharge Medications:  New Prescriptions    AMOXICILLIN-CLAVULANATE (AUGMENTIN) 875-125 MG TABLET    Take 1 tablet by mouth 2 times daily for 10 days        Scribe Disclosure:  Collette KEMP, am serving as a scribe at 2:11 PM on 1/15/2023 to document services personally performed by Werner Wolfe MD based on my observations and the provider's statements to me.     1/15/2023   Werner Wolfe MD Vaughn, Christopher E, MD  01/15/23 2607

## 2023-01-16 NOTE — DISCHARGE INSTRUCTIONS
Discharge Instructions  Fever    You have been seen today for a fever. Fever is a normal body reaction to illness or inflammation. Fever is a sign that your body is doing what it should to fight something off. Fever is not dangerous, but it can make you feel miserable, and you will probably feel better if you get your fever to go down. Most infections are caused by a virus, and antibiotics will not help; your provider will tell you whether antibiotics are needed in your case. At this time your provider does not find that your fever is a sign of anything dangerous or life-threatening.  However, sometimes the signs of serious illness do not show up right away so additional care may be necessary.    Generally, every Emergency Department visit should have a follow-up clinic visit with either a primary or a specialty clinic/provider. Please follow-up as instructed by your emergency provider today.    What can I do to help myself?  Fill any prescriptions the provider gave you and take them right away--especially antibiotics.  If you have a fever, get plenty of rest and drink lots of fluids, especially water.  What clothes or blankets you have on will not change your fever. Do what is comfortable for you.  Bathing or sponging in lukewarm water may help you feel better.  Tylenol  (acetaminophen), Motrin  (ibuprofen), or Advil  (ibuprofen) help bring fever down and may help you feel more comfortable. Be sure to read and follow the package directions, and ask your provider if you have questions.  Do not drink alcohol.    Return to the Emergency Department if:  Any of the symptoms you have get much worse.  You seem very sick, like being too weak to get up.  You have any new symptoms, especially serious things like abdominal (belly) pain or chest pain.  You are short of breath.  You have a severe headache.  You are vomiting (throwing up) so much you cannot keep fluids or medicines down.  You have confusion or seem unusually  drowsy.  You have a seizure.  You have anything else that worries you.  If you were given a prescription for medicine here today, be sure to read all of the information (including the package insert) that comes with your prescription.  This will include important information about the medicine, its side effects, and any warnings that you need to know about.  The pharmacist who fills the prescription can provide more information and answer questions you may have about the medicine.  If you have questions or concerns that the pharmacist cannot address, please call or return to the Emergency Department.   Remember that you can always come back to the Emergency Department if you are not able to see your regular provider in the amount of time listed above, if you get any new symptoms, or if there is anything that worries you.    -Take acetaminophen 500 to 1000 mg by mouth every 4 to 6 hours as needed for pain or fever.  Do not take more than 4000 mg in 24 hours.  Do not take within 6 hours of another acetaminophen containing medication such as norco (vicodin) or percocet.  - Take ibuprofen 600 to 800 mg by mouth every 6 to 8 hours as needed for pain or fever

## 2023-01-16 NOTE — ED NOTES
Alert and oriented. Patient reported tactile fever. Temp checked orally and was 100.6F. MD updated. Administered Tylenol 1000 mg PO as requested.     Discharged to home with .   AVS and discharge instructions given to patient and .   All belongings sent with patient.

## 2023-01-16 NOTE — ED TRIAGE NOTES
Patient reported feeling better after receiving Diphenhydramine IV. Itchiness subsided. Breathing well on room air.   Blood pressure 97/65, pulse 96, temperature 98.1  F (36.7  C), temperature source Oral, resp. rate 17, height 1.524 m (5'), weight 60.3 kg (133 lb), SpO2 99 %.

## 2023-01-16 NOTE — ED NOTES
Patient complained of scalp itchiness  after completion of Vancomycin infusion. Noted scalp and facial redness. Breathing well on room air. No changes in voice.   Blood pressure 90/53, pulse 98, temperature 99.1  F (37.3  C), temperature source Oral, resp. rate 12, height 1.524 m (5'), weight 60.3 kg (133 lb), SpO2 92 %.  Administered Diphenhydramine 25 mg IV as ordered.

## 2023-01-18 NOTE — PROGRESS NOTES
Adelita is a 32 year old who is being evaluated via a billable video visit.      Video-Visit Details    Type of service:  Video Visit   Video Start Time: 1302  Video End Time:1339    Originating Location (pt. Location): Home  Distant Location (provider location):  On-site  Platform used for Video Visit: [employer]  __________________________________________________________    ___________________________________  ESTABLISHED PATIENT NEUROLOGY NOTE    DATE OF VISIT: 1/19/2023  MRN: [id number]  PATIENT NAME: Adelita Bosch  YOB: 1990      Chief Complaint: Patient presents with:  Stroke: Medication questions      SUBJECTIVE:                                                      HISTORY OF PRESENT ILLNESS:  History of Present Illness: Adelita Bosch is a 32 year old female presenting for follow-up for Chronic lacunar infarct left cerebellum.     She was hospitalized at [facility] on 01/15/2023. Prior to the hospital stay, she had a past medical history of rheumatoid arthritis, Sjogren's syndrome and TB.    She presented to the hospital with 1 month of tremors and paraesthesias to the hands and face. 4 days ago, she had these symptoms and was seen at her clinic. It was determined that she had an adverse reaction to Plaquenil and was advised to follow up with her rheumatologist. She was also prescribed sertraline but she states she has not been taking this.  Yesterday, her symptoms recurred along with a headache, myalgias, and nausea. Today, she developed full body tremors, prompting her to be brought to the ED. She notes history of tuberculosis 9 months ago, which she was treated successfully for, as well as history of rheumatoid arthritis. She denies recent travel.    Labs ordered as noted above significant for leukocytosis, negative viral studies, UA without infection.  Checks x-ray with no evidence of acute pneumonia.  given fluids as noted above with episodes of transient hypotension.   Throughout her ED course however, the patient reports feeling significantly improved.  Given her tonsillitis and obvious source of infection, She was treated with Augmentin on outpatient basis. Her blood pressure stabilized in the emergency department prior to discharge    Today 01/19/23  Adelita arrives for her virtual appointment for post stroke follow-up.  She was seen in the emergency department on 01/15/23. During  Her work-up revealed a chronic lacunar infarct left cerebellum.  She states that in December she fell on a wet floor and hit her head.  She did not lose consciousness but states that she saw stars.  She also reports that in December she had a tremor in her left arm and leg.  She reports that she noticed more recently that she is weaker on her left side than her right.  No problems with ambulation.  She has some difficulty swallowing food.  She was assessed by her primary who states she may have acid reflux.  She was seen and evaluated for tremors in the past.  They thought it could be related to her anxiety and started her a medication to help but it was not beneficial.  She is prescribed Cimzia for her rheumatoid arthritis but states she has taken the last dose.  She feels this medication could be contributing to her symptoms.    Adelita also states that her smart watch often tracks her HR in the 140's - 150's range with minimal activity.  At times she has shortness of breath, chest pressure along and palpitations when her HR is elevated.     -Nexplanon implant in place- Birth control for the last 6 years.   -She smokes very rarely- one or two cigarettes a year.   -States that at times she wakes up gasping for breath.  Wakes up fatigued, daytime sleepiness noted  -Drinks on occasion when out with friends.   -2 children  -No history of bleeding disorders, cardiac disease or DM that she is aware of.   -Per Adelita, she was started on ABX for fever and tachycardia while in the ED. Dx tonsillitis      Psycho-Social History: Adelita Bosch currently lives [place], with  and two kids. Highest level of education 9th grade. Employment status: Not working    Patient does smoke on rare occasions, some alcohol use, no recreational drug use.  Currently, patient denies feeling depressed, denies feeling anhedonia, denies suicidal  thoughts, and denies having feelings of excessive guilt/worthlessness.  We reviewed importance of mental and emotional wellbeing and impact on health.      Current Medications:   amoxicillin-clavulanate (AUGMENTIN) 875-125 MG tablet, Take 1 tablet by mouth 2 times daily  hydrOXYzine (ATARAX) 25 MG tablet, Take 1-2 tablets (25-50 mg) by mouth 3 times daily as needed for anxiety  methylPREDNISolone (MEDROL DOSEPAK) 4 MG tablet therapy pack, Follow Package Directions    No current facility-administered medications on file prior to visit.    Past Medical History:   Patient  has a past medical history of Rheumatoid arthritis (H).  Surgical History:  She  has no past surgical history on file.  Family and Social History:  Reviewed, and she  reports that she has never smoked. She does not have any smokeless tobacco history on file. She reports current alcohol use. She reports that she does not use drugs.  Reviewed, and family history is not on file.    RECENT DIAGNOSTIC STUDIES:   Labs:    Coagulation studies:  No lab results found.     Lipid panel:  No lab results found.    HbA1C:  No lab results found.    Troponin:  No lab results found.  No lab results found.  No lab results found.    Imaging:   EXAM: CT HEAD W/O CONTRAST  LOCATION: [facility]  DATE/TIME: 1/15/2023 6:58 PM  IMPRESSION:  1.  No acute intracranial process.  2.  Chronic lacunar infarct left cerebellum.     REVIEW OF SYSTEMS:                                                      10-point review of systems is negative except as mentioned above in HPI.    EXAM:                                                       Physical Exam:   Vitals: No vitals were obtained today due to virtual visit.     MENTAL STATUS: Alert, attentive.   GENERAL: Healthy, alert and no distress  EYES: Eyes grossly normal to inspection.  No discharge   RESP: No audible wheeze, cough, or visible cyanosis.  No visible retractions or increased work of breathing.    MS: No gross musculoskeletal defects noted and normal range of motion of the upper extremities. normal finger-to-nose without dysmetria, rapid alternating movements symmetric  SKIN: Visible skin clear. No significant rash, abnormal pigmentation or lesions to face or neck.  NEURO: Cranial nerves grossly intact.  Mentation and speech appropriate for age. Speech is fluent. Normal comprehension. Normal concentration. Adequate fund of knowledge. EOMI with normal smooth pursuit, facial movements symmetric, hearing not formally tested but intact to conversation, no dysarthria, shoulder shrug equal bilaterally, tongue protrusion midline  PSYCH: Mentation appears normal, affect normal/bright, judgement and insight intact, normal speech and appearance well-groomed.     ASSESSMENT and PLAN:                                                      Assessment:    ICD-10-CM    1. Abnormal involuntary movement  R25.9 EEG Sleep Deprived      2. Chronic cerebrovascular accident  I69.30 Adult Neurology  Referral     MR Brain w/o & w Contrast     MRA Brain ([place]) wo & w Contrast     MRA Neck (Carotids) wo & w Contrast     Echocardiogram Complete     Adult Cardiac Event Monitor     Adult Sleep Eval & Management  Referral     Lipid panel reflex to direct LDL Fasting     Hemoglobin A1c      3. Cerebellar infarct (H)  I63.9 Adult Neurology  Referral        Adelita Bosch is a 32 year old female presenting for follow-up for chronic lacunar infarct left cerebellum found on a head CT when evaluated in the emergency department for headache, 1 month of tremors, myalgias,  fever, paraesthesias to the hands and face and nausea.  Past medical history of rheumatoid arthritis, Sjogren's syndrome and TB.  While in the emergency department a head CT revealed chronic lacunar left infarct of the cerebellum.  She currently reports a mild tremor, mild left-sided weakness, intermittent tachycardia, palpitations and chest pressure. We discussed interventions outlined below, will plan to see the patient back in the clinic after testing is complete. Adelita understands and agrees with this plan.     Plan:     Diagnostic workup  - MRI/MRA   - Sleep deprived EEG- Left sided tremor  - Echocardiogram  - Cardiac event monitor  - Sleep Study  - Labs: Lipid panel, A1c    Secondary prevention  - Start Aspirin 81 mg daily    --- Plan to follow-up with rheumatology on medications. (stopped taking medications)  --- Plan on follow up in the Neurology Clinic in after testing.  --- Please feel free to reach out if you have any further questions or concerns.  --- Seek immediate medical attention if an emergency arises or if your health becomes progressively worse.     For any acute neurologic deficits she was advised to  go directly to the hospital rather than call the clinic.    It was a pleasure to meet you today!       Total Time: Total time spent for face to face visit, reviewing labs/imaging studies, counseling and coordination of care was: 60 Minutes spent on the date of the encounter doing chart review, review of test results, patient visit, documentation and discussion with other provider(s) yana      This note was dictated using voice recognition software.  Any grammatical or context distortions are unintentional and inherent to the software.    Kacey Sullivan, DNP, APRN, CNP  [facility]

## 2023-01-19 ENCOUNTER — VIRTUAL VISIT (OUTPATIENT)
Dept: NEUROLOGY | Facility: CLINIC | Age: 33
End: 2023-01-19
Payer: MEDICAID

## 2023-01-19 VITALS — BODY MASS INDEX: 25.52 KG/M2 | WEIGHT: 130 LBS | HEIGHT: 60 IN

## 2023-01-19 DIAGNOSIS — R25.9 ABNORMAL INVOLUNTARY MOVEMENT: Primary | ICD-10-CM

## 2023-01-19 DIAGNOSIS — I63.9 CEREBELLAR INFARCT (H): ICD-10-CM

## 2023-01-19 DIAGNOSIS — Z86.73 CHRONIC CEREBROVASCULAR ACCIDENT: ICD-10-CM

## 2023-01-19 PROCEDURE — 99205 OFFICE O/P NEW HI 60 MIN: CPT | Mod: 95

## 2023-01-19 NOTE — LETTER
1/19/2023         RE: Adelita Bosch  [address] [facility] [address]        Dear Colleague,    Thank you for referring your patient, Adelita Bosch, to the [facility] [place]. Please see a copy of my visit note below.    Adelita is a 32 year old who is being evaluated via a billable video visit.      Video-Visit Details    Type of service:  Video Visit   Video Start Time: 1302  Video End Time:1339    Originating Location (pt. Location): Home  Distant Location (provider location):  On-site  Platform used for Video Visit: [employer]  __________________________________________________________    ___________________________________  ESTABLISHED PATIENT NEUROLOGY NOTE    DATE OF VISIT: 1/19/2023  MRN: [id number]  PATIENT NAME: Adelita Bosch  YOB: 1990      Chief Complaint: Patient presents with:  Stroke: Medication questions      SUBJECTIVE:                                                      HISTORY OF PRESENT ILLNESS:  History of Present Illness: Adelita Bosch is a 32 year old female presenting for follow-up for Chronic lacunar infarct left cerebellum.     She was hospitalized at [facility] on 01/15/2023. Prior to the hospital stay, she had a past medical history of rheumatoid arthritis, Sjogren's syndrome and TB.    She presented to the hospital with 1 month of tremors and paraesthesias to the hands and face. 4 days ago, she had these symptoms and was seen at her clinic. It was determined that she had an adverse reaction to Plaquenil and was advised to follow up with her rheumatologist. She was also prescribed sertraline but she states she has not been taking this.  Yesterday, her symptoms recurred along with a headache, myalgias, and nausea. Today, she developed full body tremors, prompting her to be brought to the ED. She notes history of tuberculosis 9 months ago, which she was treated successfully for, as well as history of  rheumatoid arthritis. She denies recent travel.    Labs ordered as noted above significant for leukocytosis, negative viral studies, UA without infection.  Checks x-ray with no evidence of acute pneumonia.  given fluids as noted above with episodes of transient hypotension.  Throughout her ED course however, the patient reports feeling significantly improved.  Given her tonsillitis and obvious source of infection, She was treated with Augmentin on outpatient basis. Her blood pressure stabilized in the emergency department prior to discharge    Today 01/19/23  Adelita arrives for her virtual appointment for post stroke follow-up.  She was seen in the emergency department on 01/15/23. During  Her work-up revealed a chronic lacunar infarct left cerebellum.  She states that in December she fell on a wet floor and hit her head.  She did not lose consciousness but states that she saw stars.  She also reports that in December she had a tremor in her left arm and leg.  She reports that she noticed more recently that she is weaker on her left side than her right.  No problems with ambulation.  She has some difficulty swallowing food.  She was assessed by her primary who states she may have acid reflux.  She was seen and evaluated for tremors in the past.  They thought it could be related to her anxiety and started her a medication to help but it was not beneficial.  She is prescribed Cimzia for her rheumatoid arthritis but states she has taken the last dose.  She feels this medication could be contributing to her symptoms.    Adelita also states that her smart watch often tracks her HR in the 140's - 150's range with minimal activity.  At times she has shortness of breath, chest pressure along and palpitations when her HR is elevated.     -Nexplanon implant in place- Birth control for the last 6 years.   -She smokes very rarely- one or two cigarettes a year.   -States that at times she wakes up gasping for breath.  Wakes up  fatigued, daytime sleepiness noted  -Drinks on occasion when out with friends.   -2 children  -No history of bleeding disorders, cardiac disease or DM that she is aware of.   -Per Adelita, she was started on ABX for fever and tachycardia while in the ED. Dx tonsillitis     Psycho-Social History: Adelita Bosch currently lives [place], with  and two kids. Highest level of education 9th grade. Employment status: Not working    Patient does smoke on rare occasions, some alcohol use, no recreational drug use.  Currently, patient denies feeling depressed, denies feeling anhedonia, denies suicidal  thoughts, and denies having feelings of excessive guilt/worthlessness.  We reviewed importance of mental and emotional wellbeing and impact on health.      Current Medications:   amoxicillin-clavulanate (AUGMENTIN) 875-125 MG tablet, Take 1 tablet by mouth 2 times daily  hydrOXYzine (ATARAX) 25 MG tablet, Take 1-2 tablets (25-50 mg) by mouth 3 times daily as needed for anxiety  methylPREDNISolone (MEDROL DOSEPAK) 4 MG tablet therapy pack, Follow Package Directions    No current facility-administered medications on file prior to visit.    Past Medical History:   Patient  has a past medical history of Rheumatoid arthritis (H).  Surgical History:  She  has no past surgical history on file.  Family and Social History:  Reviewed, and she  reports that she has never smoked. She does not have any smokeless tobacco history on file. She reports current alcohol use. She reports that she does not use drugs.  Reviewed, and family history is not on file.    RECENT DIAGNOSTIC STUDIES:   Labs:    Coagulation studies:  No lab results found.     Lipid panel:  No lab results found.    HbA1C:  No lab results found.    Troponin:  No lab results found.  No lab results found.  No lab results found.    Imaging:   EXAM: CT HEAD W/O CONTRAST  LOCATION: [facility]  DATE/TIME: 1/15/2023 6:58 PM  IMPRESSION:  1.  No  acute intracranial process.  2.  Chronic lacunar infarct left cerebellum.     REVIEW OF SYSTEMS:                                                      10-point review of systems is negative except as mentioned above in HPI.    EXAM:                                                      Physical Exam:   Vitals: No vitals were obtained today due to virtual visit.     MENTAL STATUS: Alert, attentive.   GENERAL: Healthy, alert and no distress  EYES: Eyes grossly normal to inspection.  No discharge   RESP: No audible wheeze, cough, or visible cyanosis.  No visible retractions or increased work of breathing.    MS: No gross musculoskeletal defects noted and normal range of motion of the upper extremities. normal finger-to-nose without dysmetria, rapid alternating movements symmetric  SKIN: Visible skin clear. No significant rash, abnormal pigmentation or lesions to face or neck.  NEURO: Cranial nerves grossly intact.  Mentation and speech appropriate for age. Speech is fluent. Normal comprehension. Normal concentration. Adequate fund of knowledge. EOMI with normal smooth pursuit, facial movements symmetric, hearing not formally tested but intact to conversation, no dysarthria, shoulder shrug equal bilaterally, tongue protrusion midline  PSYCH: Mentation appears normal, affect normal/bright, judgement and insight intact, normal speech and appearance well-groomed.     ASSESSMENT and PLAN:                                                      Assessment:    ICD-10-CM    1. Abnormal involuntary movement  R25.9 EEG Sleep Deprived      2. Chronic cerebrovascular accident  I69.30 Adult Neurology  Referral     MR Brain w/o & w Contrast     MRA Brain ([place]) wo & w Contrast     MRA Neck (Carotids) wo & w Contrast     Echocardiogram Complete     Adult Cardiac Event Monitor     Adult Sleep Eval & Management  Referral     Lipid panel reflex to direct LDL Fasting     Hemoglobin A1c      3. Cerebellar infarct  (H)  I63.9 Adult Neurology  Referral        Adelita Bosch is a 32 year old female presenting for follow-up for chronic lacunar infarct left cerebellum found on a head CT when evaluated in the emergency department for headache, 1 month of tremors, myalgias, fever, paraesthesias to the hands and face and nausea.  Past medical history of rheumatoid arthritis, Sjogren's syndrome and TB.  While in the emergency department a head CT revealed chronic lacunar left infarct of the cerebellum.  She currently reports a mild tremor, mild left-sided weakness, intermittent tachycardia, palpitations and chest pressure. We discussed interventions outlined below, will plan to see the patient back in the clinic after testing is complete. Adelita understands and agrees with this plan.     Plan:     Diagnostic workup  - MRI/MRA   - Sleep deprived EEG- Left sided tremor  - Echocardiogram  - Cardiac event monitor  - Sleep Study  - Labs: Lipid panel, A1c    Secondary prevention  - Start Aspirin 81 mg daily    --- Plan to follow-up with rheumatology on medications. (stopped taking medications)  --- Plan on follow up in the Neurology Clinic in after testing.  --- Please feel free to reach out if you have any further questions or concerns.  --- Seek immediate medical attention if an emergency arises or if your health becomes progressively worse.     For any acute neurologic deficits she was advised to  go directly to the hospital rather than call the clinic.    It was a pleasure to meet you today!       Total Time: Total time spent for face to face visit, reviewing labs/imaging studies, counseling and coordination of care was: 60 Minutes spent on the date of the encounter doing chart review, review of test results, patient visit, documentation and discussion with other provider(s) ayna      This note was dictated using voice recognition software.  Any grammatical or context distortions are unintentional and inherent to the  software.    Kacey Sullivan, HARRIS, APRN, CNP  [facility]       Again, thank you for allowing me to participate in the care of your patient.        Sincerely,        MICHELLE Hutton CNP

## 2023-01-19 NOTE — NURSING NOTE
Chief Complaint   Patient presents with     Stroke     Medication questions     Lisseth Ho on 1/19/2023 at 12:46 PM     [phone]

## 2023-01-22 ENCOUNTER — HEALTH MAINTENANCE LETTER (OUTPATIENT)
Age: 33
End: 2023-01-22

## 2023-01-25 ENCOUNTER — LAB (OUTPATIENT)
Dept: LAB | Facility: CLINIC | Age: 33
End: 2023-01-25
Payer: MEDICAID

## 2023-01-25 DIAGNOSIS — Z86.73 CHRONIC CEREBROVASCULAR ACCIDENT: ICD-10-CM

## 2023-01-25 LAB
CHOLEST SERPL-MCNC: 177 MG/DL
HBA1C MFR BLD: 5 % (ref 0–5.6)
HDLC SERPL-MCNC: 60 MG/DL
LDLC SERPL CALC-MCNC: 93 MG/DL
NONHDLC SERPL-MCNC: 117 MG/DL
TRIGL SERPL-MCNC: 118 MG/DL

## 2023-01-25 PROCEDURE — 83036 HEMOGLOBIN GLYCOSYLATED A1C: CPT

## 2023-01-25 PROCEDURE — 80061 LIPID PANEL: CPT

## 2023-01-25 PROCEDURE — 36415 COLL VENOUS BLD VENIPUNCTURE: CPT

## 2023-01-25 NOTE — PATIENT INSTRUCTIONS
History     Chief Complaint:  Fall       The history is provided by the patient and the EMS personnel.      Pablo Baez is a 78 year old female with a history of Parkinson's disease and hypertension who presents for evaluation after a fall from sitting. The patient states that she was sitting in a chair putting her socks on, and was rushing because a friend was waiting for her, when she lost her balance and fell forward onto her left side hitting her head. She denies feeling weak, dizzy, or having chest pain prior to the fall stating it was mechanical in nature. EMS states that the patient was ambulatory on scene. They report noticing a laceration just lateral to the patient's left eye. The patient reports some mild left cheekbone pain and a mild headache, but denies any neck pain, back pain, loss of consciousness, or any other injuries. She does report some residual upper right arm pain and right sided rib pain from a fall that occurred last week. She denies any shortness of breath, fever or hemoptysis. She denies being on blood thinners. She thinks her tdap is up to date but is unsure. Per chart review her last tdap update was in 2018.       Independent Historian:    EMS    ROS:  Review of Systems   Constitutional: Negative for fever.   Respiratory: Negative for shortness of breath.    Cardiovascular: Negative for chest pain.   Musculoskeletal: Negative for back pain and neck pain.        Left cheekbone pain   Skin: Positive for wound.   Neurological: Positive for headaches. Negative for dizziness, syncope and weakness.   All other systems reviewed and are negative.      Allergies:  Lisinopril     Medications:    Sinemet  Pepcid  Robinul   Senokot  Methocarbamol    Past Medical History:    Fracture thoracic vertebra  Rib fracture  Repeated falls  Parkinson's disease  Spondylosis with myelopathy (cervical region)  Essential hypertension  Gastro-oesophageal reflux   Osteoarthritis  Mild cognitive  Plan:     Diagnostic workup  - MRI/MRA   - Sleep deprived EEG- Left sided tremor  - Echocardiogram  - Cardiac event monitor  - Sleep Study  - Labs: Lipid panel, A1c    Secondary prevention  - Start Aspirin 81 mg daily    --- Plan to follow-up with rheumatology on medications. (stopped taking medications)  --- Plan on follow up in the Neurology Clinic in after testing.  --- Please feel free to reach out if you have any further questions or concerns.  --- Seek immediate medical attention if an emergency arises or if your health becomes progressively worse.     For any acute neurologic deficits she was advised to  go directly to the hospital rather than call the clinic.    It was a pleasure to meet you today!    "impairment  Insomnia  Dry eye syndrome  Psychotic disorder with hallucinations  Enterocolitis due to Clostridium difficile  Bacterial pneumonia  Impaired mobility  Left displaced femoral neck fracture  Mohs defect of cheek  Basal cell carcinoma   Mohs defect of cheek  Oropharyngeal dysphagia  DJD (degenerative joint disease) of cervical spine  Vertigo  Herpes simplex virus (HSV) infection  Varicose vein of leg  Cellulitis and abscess of face  Idiopathic urticaria  Astigmatism  Prepatellar bursitis    Past Surgical History:    Repair umbilical hernia  Tubal ligation  Colonoscopy  Sigmoidoscopy  Face lift  Fusion anterior approach cervical spine with corpectomy C4 with C3-C5 fusion and instrumentation  Right hip surgery    Family History:    Father: cancer, hypertension  Mother: stroke, hypertension, osteoporosis  Sister: breast cancer    Social History:  Presents to the ED unaccompanied  Living situation: [facility] assisted living  PCP: Lizzette Cortés     Physical Exam     Patient Vitals for the past 24 hrs:   BP Temp Temp src Pulse Resp SpO2 Height Weight   01/25/23 1359 (!) 162/72 -- -- 70 -- 99 % -- --   01/25/23 1329 (!) 170/81 -- -- 68 -- 99 % -- --   01/25/23 1259 (!) 159/78 -- -- 67 -- 97 % -- --   01/25/23 1230 (!) 163/86 -- -- 71 -- 96 % -- --   01/25/23 1215 (!) 156/82 -- -- -- -- 97 % -- --   01/25/23 1210 (!) 163/89 97.8  F (36.6  C) Oral 67 18 98 % 1.549 m (5' 1\") 54.4 kg (120 lb)        Physical Exam  General: Elderly female, laying on the stretcher.  Eyes: PERRL, Conjunctive within normal limits  HENT: No scalp tenderness or hematoma palpable.  Mild tenderness over the left zygoma without palpable crepitus or bony deformity or soft tissue swelling.  Moist mucous membranes, oropharynx clear.   Neck: Nontender.  Chronically limited mobility.  CV: Normal S1S2, no murmur, rub or gallop. Regular rate and rhythm  Resp: Clear to auscultation bilaterally, no wheezes, rales or rhonchi. Normal respiratory " effort.  GI: Abdomen is soft, nontender and nondistended. No palpable masses. No rebound or guarding.  MSK: No edema. Nontender. Normal active range of motion.  Skin: Warm and dry.  1.3 cm subcutaneous laceration over the left forehead just adjacent to the eye.  No active bleeding.  No rashes or lesions or ecchymoses on visible skin.  Neuro: Alert and oriented. Responds appropriately to all questions and commands. No focal findings appreciated.   Psych: Normal mood and affect. Pleasant.    Emergency Department Course     Imaging:  CT Facial Bones without Contrast   Final Result   IMPRESSION:   No facial bone fracture.      PETER SCHULTE MD            SYSTEM ID:  [id number]      CT Head w/o Contrast   Final Result   IMPRESSION:   No intracranial hemorrhage, mass, or definite CT evidence of recent   ischemia.      PETER SCHULTE MD            SYSTEM ID:  [id number]         Report per radiology    Procedures     Laceration Repair      Procedure: Laceration Repair    Indication: Laceration    Consent: Verbal    Location: Superior/lateral to the left eye    Length: 1.3 cm    Preparation: Irrigation with Sterile Saline.    Anesthesia/Sedation: Lidocaine with Epinephrine - 1%      Treatment/Exploration: Wound explored, no foreign bodies found     Closure: The wound was closed with one layer. Skin/superficial layer was closed with 2 x 6-0 Nylon using Interrupted sutures.     Patient Status: The patient tolerated the procedure well: Yes. There were no complications.      Emergency Department Course & Assessments:             Interventions:  Medications   carbidopa-levodopa (SINEMET)  MG per tablet 2 tablet (2 tablets Oral Given 1/25/23 1505)        Consultations/Discussion of Management or Tests:  I discussed the findings of the CT head and face with the patient.  All questions were answered.    Social Determinants of Health affecting care:  None apparent    Assessments:  1220 I obtained history and examined  the patient as noted above.   1442 I rechecked and updated the patient. I repaired her laceration (see procedure note above).     Disposition:  The patient was discharged to home.     Impression & Plan       Medical Decision Making:  Haydee Baez is a 78-year-old female with a history of Parkinson's disease who presents emergency department with a mechanical fall resulting in facial injury and laceration.  Fortunately, despite the head injury there is no evidence of acute abnormalities of the brain.  She has no evidence of facial fracture.  She is neurologically intact.  Small laceration was repaired as per procedure note.  She feels comfortable to plan for discharge home, at baseline.  She denies any new concerns.  Recommended follow-up for reassessment of head injury as needed.  Recommended suture removal in 5 to 7 days.  All questions were answered prior to discharge.  Return as needed.    Diagnosis:    ICD-10-CM    1. Closed head injury, initial encounter  S09.90XA       2. Facial laceration, initial encounter  S01.81XA       3. Contusion of face, initial encounter  S00.83XA          Scribe Disclosure:  I, Katya Mckenna, am serving as a scribe at 2:22 PM on 1/25/2023 to document services personally performed by Funmi Gutierrez MD based on my observations and the provider's statements to me.   1/25/2023   Funmi Gutierrez MD Jonkman, Tracy Dianne, MD  01/27/23 4055

## 2023-01-30 ENCOUNTER — ANCILLARY PROCEDURE (OUTPATIENT)
Dept: NEUROLOGY | Facility: CLINIC | Age: 33
End: 2023-01-30
Payer: MEDICAID

## 2023-01-30 DIAGNOSIS — R25.9 ABNORMAL INVOLUNTARY MOVEMENT: ICD-10-CM

## 2023-01-30 PROCEDURE — 2894A EEG SLEEP DEPRIVED: CPT | Performed by: PSYCHIATRY & NEUROLOGY

## 2023-01-30 PROCEDURE — 95812 EEG 41-60 MINUTES: CPT | Performed by: PSYCHIATRY & NEUROLOGY

## 2023-02-01 ENCOUNTER — HOSPITAL ENCOUNTER (OUTPATIENT)
Facility: CLINIC | Age: 33
Setting detail: OBSERVATION
Discharge: HOME | End: 2023-02-02
Attending: EMERGENCY MEDICINE | Admitting: INTERNAL MEDICINE
Payer: MEDICAID

## 2023-02-01 ENCOUNTER — APPOINTMENT (OUTPATIENT)
Dept: MRI IMAGING | Facility: CLINIC | Age: 33
End: 2023-02-01
Attending: EMERGENCY MEDICINE
Payer: MEDICAID

## 2023-02-01 DIAGNOSIS — R42 DIZZINESS: ICD-10-CM

## 2023-02-01 DIAGNOSIS — I63.9 CEREBROVASCULAR ACCIDENT (CVA), UNSPECIFIED MECHANISM (H): Primary | ICD-10-CM

## 2023-02-01 DIAGNOSIS — G45.9 TIA (TRANSIENT ISCHEMIC ATTACK): ICD-10-CM

## 2023-02-01 DIAGNOSIS — R13.10 DYSPHAGIA, UNSPECIFIED TYPE: ICD-10-CM

## 2023-02-01 DIAGNOSIS — R55 NEAR SYNCOPE: ICD-10-CM

## 2023-02-01 LAB
ALBUMIN SERPL BCG-MCNC: 4.3 G/DL (ref 3.5–5.2)
ALP SERPL-CCNC: 56 U/L (ref 35–104)
ALT SERPL W P-5'-P-CCNC: 11 U/L (ref 10–35)
ANION GAP SERPL CALCULATED.3IONS-SCNC: 9 MMOL/L (ref 7–15)
AST SERPL W P-5'-P-CCNC: 17 U/L (ref 10–35)
ATRIAL RATE - MUSE: 82 BPM
BASOPHILS # BLD AUTO: 0.1 10E3/UL (ref 0–0.2)
BASOPHILS NFR BLD AUTO: 1 %
BILIRUB SERPL-MCNC: 0.8 MG/DL
BUN SERPL-MCNC: 8.1 MG/DL (ref 6–20)
CALCIUM SERPL-MCNC: 9.1 MG/DL (ref 8.6–10)
CHLORIDE SERPL-SCNC: 104 MMOL/L (ref 98–107)
CREAT SERPL-MCNC: 0.59 MG/DL (ref 0.51–0.95)
DEPRECATED HCO3 PLAS-SCNC: 26 MMOL/L (ref 22–29)
DIASTOLIC BLOOD PRESSURE - MUSE: NORMAL MMHG
EOSINOPHIL # BLD AUTO: 0.1 10E3/UL (ref 0–0.7)
EOSINOPHIL NFR BLD AUTO: 2 %
ERYTHROCYTE [DISTWIDTH] IN BLOOD BY AUTOMATED COUNT: 12.2 % (ref 10–15)
GFR SERPL CREATININE-BSD FRML MDRD: >90 ML/MIN/1.73M2
GLUCOSE SERPL-MCNC: 85 MG/DL (ref 70–99)
HCG SERPL QL: NEGATIVE
HCT VFR BLD AUTO: 39.6 % (ref 35–47)
HGB BLD-MCNC: 13.7 G/DL (ref 11.7–15.7)
HOLD SPECIMEN: NORMAL
IMM GRANULOCYTES # BLD: 0 10E3/UL
IMM GRANULOCYTES NFR BLD: 0 %
INTERPRETATION ECG - MUSE: NORMAL
LYMPHOCYTES # BLD AUTO: 2.6 10E3/UL (ref 0.8–5.3)
LYMPHOCYTES NFR BLD AUTO: 43 %
MAGNESIUM SERPL-MCNC: 2 MG/DL (ref 1.7–2.3)
MCH RBC QN AUTO: 32.9 PG (ref 26.5–33)
MCHC RBC AUTO-ENTMCNC: 34.6 G/DL (ref 31.5–36.5)
MCV RBC AUTO: 95 FL (ref 78–100)
MONOCYTES # BLD AUTO: 0.4 10E3/UL (ref 0–1.3)
MONOCYTES NFR BLD AUTO: 7 %
NEUTROPHILS # BLD AUTO: 2.9 10E3/UL (ref 1.6–8.3)
NEUTROPHILS NFR BLD AUTO: 47 %
NRBC # BLD AUTO: 0 10E3/UL
NRBC BLD AUTO-RTO: 0 /100
NT-PROBNP SERPL-MCNC: 148 PG/ML (ref 0–450)
P AXIS - MUSE: 69 DEGREES
PLATELET # BLD AUTO: 218 10E3/UL (ref 150–450)
POTASSIUM SERPL-SCNC: 3.6 MMOL/L (ref 3.4–5.3)
PR INTERVAL - MUSE: 150 MS
PROT SERPL-MCNC: 7.3 G/DL (ref 6.4–8.3)
QRS DURATION - MUSE: 72 MS
QT - MUSE: 366 MS
QTC - MUSE: 427 MS
R AXIS - MUSE: 56 DEGREES
RBC # BLD AUTO: 4.16 10E6/UL (ref 3.8–5.2)
SODIUM SERPL-SCNC: 139 MMOL/L (ref 136–145)
SYSTOLIC BLOOD PRESSURE - MUSE: NORMAL MMHG
T AXIS - MUSE: 50 DEGREES
TROPONIN T SERPL HS-MCNC: 35 NG/L
TROPONIN T SERPL HS-MCNC: <6 NG/L
VENTRICULAR RATE- MUSE: 82 BPM
WBC # BLD AUTO: 6.1 10E3/UL (ref 4–11)

## 2023-02-01 PROCEDURE — 83735 ASSAY OF MAGNESIUM: CPT | Performed by: EMERGENCY MEDICINE

## 2023-02-01 PROCEDURE — 70553 MRI BRAIN STEM W/O & W/DYE: CPT

## 2023-02-01 PROCEDURE — 70549 MR ANGIOGRAPH NECK W/O&W/DYE: CPT

## 2023-02-01 PROCEDURE — 80053 COMPREHEN METABOLIC PANEL: CPT | Performed by: EMERGENCY MEDICINE

## 2023-02-01 PROCEDURE — 70544 MR ANGIOGRAPHY HEAD W/O DYE: CPT

## 2023-02-01 PROCEDURE — G0378 HOSPITAL OBSERVATION PER HR: HCPCS

## 2023-02-01 PROCEDURE — 255N000002 HC RX 255 OP 636: Performed by: EMERGENCY MEDICINE

## 2023-02-01 PROCEDURE — 84703 CHORIONIC GONADOTROPIN ASSAY: CPT | Performed by: EMERGENCY MEDICINE

## 2023-02-01 PROCEDURE — 93005 ELECTROCARDIOGRAM TRACING: CPT

## 2023-02-01 PROCEDURE — 99223 1ST HOSP IP/OBS HIGH 75: CPT | Performed by: INTERNAL MEDICINE

## 2023-02-01 PROCEDURE — A9585 GADOBUTROL INJECTION: HCPCS | Performed by: EMERGENCY MEDICINE

## 2023-02-01 PROCEDURE — 99291 CRITICAL CARE FIRST HOUR: CPT | Mod: 25

## 2023-02-01 PROCEDURE — 84484 ASSAY OF TROPONIN QUANT: CPT | Performed by: EMERGENCY MEDICINE

## 2023-02-01 PROCEDURE — 85025 COMPLETE CBC W/AUTO DIFF WBC: CPT | Performed by: EMERGENCY MEDICINE

## 2023-02-01 PROCEDURE — 83880 ASSAY OF NATRIURETIC PEPTIDE: CPT | Performed by: EMERGENCY MEDICINE

## 2023-02-01 PROCEDURE — 84443 ASSAY THYROID STIM HORMONE: CPT | Performed by: INTERNAL MEDICINE

## 2023-02-01 PROCEDURE — 36415 COLL VENOUS BLD VENIPUNCTURE: CPT | Performed by: EMERGENCY MEDICINE

## 2023-02-01 RX ORDER — ONDANSETRON 2 MG/ML
4 INJECTION INTRAMUSCULAR; INTRAVENOUS EVERY 6 HOURS PRN
Status: DISCONTINUED | OUTPATIENT
Start: 2023-02-01 | End: 2023-02-02 | Stop reason: HOSPADM

## 2023-02-01 RX ORDER — PREDNISONE 2.5 MG/1
2.5 TABLET ORAL DAILY
COMMUNITY

## 2023-02-01 RX ORDER — GADOBUTROL 604.72 MG/ML
10 INJECTION INTRAVENOUS ONCE
Status: COMPLETED | OUTPATIENT
Start: 2023-02-01 | End: 2023-02-01

## 2023-02-01 RX ORDER — AMOXICILLIN 250 MG
1 CAPSULE ORAL 2 TIMES DAILY PRN
Status: DISCONTINUED | OUTPATIENT
Start: 2023-02-01 | End: 2023-02-02 | Stop reason: HOSPADM

## 2023-02-01 RX ORDER — POLYETHYLENE GLYCOL 3350 17 G/17G
17 POWDER, FOR SOLUTION ORAL DAILY PRN
Status: DISCONTINUED | OUTPATIENT
Start: 2023-02-01 | End: 2023-02-02 | Stop reason: HOSPADM

## 2023-02-01 RX ORDER — HYDROXYCHLOROQUINE SULFATE 200 MG/1
300 TABLET, FILM COATED ORAL DAILY
COMMUNITY

## 2023-02-01 RX ORDER — ALBUTEROL SULFATE 90 UG/1
2 AEROSOL, METERED RESPIRATORY (INHALATION) EVERY 6 HOURS PRN
COMMUNITY
End: 2024-10-01

## 2023-02-01 RX ORDER — AMOXICILLIN 250 MG
2 CAPSULE ORAL 2 TIMES DAILY PRN
Status: DISCONTINUED | OUTPATIENT
Start: 2023-02-01 | End: 2023-02-02 | Stop reason: HOSPADM

## 2023-02-01 RX ORDER — PREDNISONE 2.5 MG/1
2.5 TABLET ORAL DAILY
Status: DISCONTINUED | OUTPATIENT
Start: 2023-02-02 | End: 2023-02-02 | Stop reason: HOSPADM

## 2023-02-01 RX ORDER — ASPIRIN 81 MG/1
81 TABLET ORAL DAILY
Status: DISCONTINUED | OUTPATIENT
Start: 2023-02-02 | End: 2023-02-02 | Stop reason: HOSPADM

## 2023-02-01 RX ORDER — ACETAMINOPHEN 325 MG/1
650 TABLET ORAL EVERY 6 HOURS PRN
Status: DISCONTINUED | OUTPATIENT
Start: 2023-02-01 | End: 2023-02-02 | Stop reason: HOSPADM

## 2023-02-01 RX ORDER — SERTRALINE HYDROCHLORIDE 25 MG/1
25 TABLET, FILM COATED ORAL DAILY
COMMUNITY
End: 2023-04-13

## 2023-02-01 RX ORDER — LIDOCAINE 40 MG/G
CREAM TOPICAL
Status: DISCONTINUED | OUTPATIENT
Start: 2023-02-01 | End: 2023-02-01

## 2023-02-01 RX ORDER — ONDANSETRON 4 MG/1
4 TABLET, ORALLY DISINTEGRATING ORAL EVERY 6 HOURS PRN
Status: DISCONTINUED | OUTPATIENT
Start: 2023-02-01 | End: 2023-02-02 | Stop reason: HOSPADM

## 2023-02-01 RX ORDER — ACETAMINOPHEN 650 MG/1
650 SUPPOSITORY RECTAL EVERY 6 HOURS PRN
Status: DISCONTINUED | OUTPATIENT
Start: 2023-02-01 | End: 2023-02-02 | Stop reason: HOSPADM

## 2023-02-01 RX ADMIN — GADOBUTROL 10 ML: 604.72 INJECTION INTRAVENOUS at 19:12

## 2023-02-01 ASSESSMENT — ENCOUNTER SYMPTOMS
NAUSEA: 0
TROUBLE SWALLOWING: 1
SHORTNESS OF BREATH: 1
VOMITING: 0
LIGHT-HEADEDNESS: 1
PALPITATIONS: 1
FEVER: 0
NUMBNESS: 1
HEADACHES: 1
SORE THROAT: 0

## 2023-02-01 ASSESSMENT — ACTIVITIES OF DAILY LIVING (ADL)
ADLS_ACUITY_SCORE: 35

## 2023-02-01 NOTE — ED PROVIDER NOTES
History     Chief Complaint:  Palpitations and Syncope       HPI   Adelita Bosch is a 32 year old female who presents with episodes of posterior aspect headache, light headedness and arm numbness over the past two weeks. Today she had an episode of difficulty swallowing around 1400. Also endorses ear pain, balance issues, and pain over her chest and left arm.   Patient was seen 1/15 here in the ED for fever and headaches. She was diagnosed with tonsillitis and  Discharged on Augmentin. On head CT, chronic lacunar infarct was also found and patient was referred to neurology. Patient had televisit with them Monday and MRI scheduled for next week.    Independent Historian:     Review of External Notes: I reviewed the patient's ED note from 1/15.    ROS:  Review of Systems   Constitutional: Negative for fever.   HENT: Positive for ear pain and trouble swallowing. Negative for sore throat.    Respiratory: Positive for shortness of breath.    Cardiovascular: Positive for chest pain and palpitations.   Gastrointestinal: Negative for nausea and vomiting.   Neurological: Positive for light-headedness, numbness and headaches.       Allergies:  No Known Allergies     Medications:    amoxicillin-clavulanate (AUGMENTIN) 875-125 MG tablet  hydrOXYzine (ATARAX) 25 MG tablet  methylPREDNISolone (MEDROL DOSEPAK) 4 MG tablet therapy pack        Past Medical History:    Past Medical History:   Diagnosis Date     Rheumatoid arthritis (H)        Past Surgical History:    No past surgical history on file.     Family History:    family history is not on file.    Social History:   reports that she has never smoked. She does not have any smokeless tobacco history on file. She reports current alcohol use. She reports that she does not use drugs.  PCP: No Ref-Primary, Physician     Physical Exam     Patient Vitals for the past 24 hrs:   BP Pulse Resp SpO2   02/01/23 1545 108/65 97 20 100 %        Physical Exam  Vital signs and  nursing notes reviewed.    General:  Alert and oriented, no acute distress.    Skin: Skin is warm and dry. No rashes, lesions, or erythema. No diaphoresis.  HEENT:   Head: Normocephalic, atraumatic. Facial features symmetric.   Eyes: Conjunctiva pink, sclera white. EOMs grossly intact.   Ears: Auricles without lesion, erythema, or edema.   Nose: Symmetric with no discharge.  Mouth and throat: Lips are moist with no lesions or edema, Buccal and oropharyngeal mucosa is pink and moist without lesions or exudate. Uvula is midline.  Neck: Normal range of motion. Neck supple with no lymphadenopathy. No tracheal deviation.   CV:  Heart RRR with no murmurs or extra heart sounds. 2+ radial and tibialis posterior pulses bilaterally. No peripheral edema.  Pulm/Chest: Chest wall expansion symmetric with no increased effort of breathing. Lungs clear and equal to auscultation bilaterally.   Abd: Bowel sounds present and physiologic. Abdomen is soft and nontender to palpation in all 4 quadrants with no guarding or rebound. No masses, hernias, or organomegaly.   Neuro: Alert and oriented x3. CN II-XII Grossly intact. No ataxia or aphasia.  M/S: Moves all extremities spontaneously.  Psych: Normal mood and affect. Behavior is normal.     Emergency Department Course   ECG:  ECG results from 02/22/22   EKG 12-lead, tracing only     Value    Systolic Blood Pressure     Diastolic Blood Pressure     Ventricular Rate 86    Atrial Rate 86    VT Interval 152    QRS Duration 70        QTc 440    P Axis 57    R AXIS 47    T Axis 33    Interpretation ECG      Undetermined rhythm  Septal infarct , age undetermined  Abnormal ECG  When compared with ECG of 30-MAR-2016 10:43,  Current undetermined rhythm precludes rhythm comparison, needs review  Septal infarct is now Present         Imaging:  MRA Brain ([facility] [place]) wo Contrast   Final Result   IMPRESSION:   HEAD MRI:   1.  No acute ischemia, mass/mass effect, or abnormal intracranial  enhancement.    2.  Chronic left inferior cerebellar infarct.      HEAD MRA:   1.  No proximal cerebral artery branch vessel occlusion, aneurysm, or vascular malformation.    2.  No flow related enhancement corresponding with the left PICA. While this can be seen due to diminutive vessel caliber/hypoplasia, the presence of chronic ipsilateral PICA territory infarct may indicate a component of underlying chronic flow    compromise.      NECK MRA:   1.  No flow-limiting stenosis or findings of dissection.         MRA Neck (Carotids) wo & w Contrast   Final Result   IMPRESSION:   HEAD MRI:   1.  No acute ischemia, mass/mass effect, or abnormal intracranial enhancement.    2.  Chronic left inferior cerebellar infarct.      HEAD MRA:   1.  No proximal cerebral artery branch vessel occlusion, aneurysm, or vascular malformation.    2.  No flow related enhancement corresponding with the left PICA. While this can be seen due to diminutive vessel caliber/hypoplasia, the presence of chronic ipsilateral PICA territory infarct may indicate a component of underlying chronic flow    compromise.      NECK MRA:   1.  No flow-limiting stenosis or findings of dissection.         MR Brain w/o & w Contrast   Final Result   IMPRESSION:   HEAD MRI:   1.  No acute ischemia, mass/mass effect, or abnormal intracranial enhancement.    2.  Chronic left inferior cerebellar infarct.      HEAD MRA:   1.  No proximal cerebral artery branch vessel occlusion, aneurysm, or vascular malformation.    2.  No flow related enhancement corresponding with the left PICA. While this can be seen due to diminutive vessel caliber/hypoplasia, the presence of chronic ipsilateral PICA territory infarct may indicate a component of underlying chronic flow    compromise.      NECK MRA:   1.  No flow-limiting stenosis or findings of dissection.            Report per radiology    Laboratory:  Labs Ordered and Resulted from Time of ED Arrival to Time of ED Departure - No  data to display     Procedures     Emergency Department Course & Assessments:         Interventions:  Medications   lidocaine 1 % 0.1-1 mL (has no administration in time range)   lidocaine (LMX4) cream (has no administration in time range)   sodium chloride (PF) 0.9% PF flush 3 mL (has no administration in time range)   sodium chloride (PF) 0.9% PF flush 3 mL (has no administration in time range)        Independent Interpretation (X-rays, CTs, rhythm strip):  I reviewed the patient's EKG alongside Dr. Ramsay.    Consultations/Discussion of Management or Tests:    ED Course as of 02/01/23 1652 Wed Feb 01, 2023   1650 Dr. Ramsay spoke with stroke-neurology team regarding patient's presentation, findings, and plan of care.          Social Determinants of Health affecting care:      Disposition:  The patient was admitted to the hospital under the care of Dr. Pitt.     Impression & Plan    CMS Diagnoses: The patient has stroke symptoms:         ED Stroke specific documentation           NIHSS PDF     Patient last known well time: 1400  ED Provider first to bedside at: 1640  CT Results received at: N/A    Thrombolytics:   Not given due to:   - minor/isolated/quickly resolving symptoms    If treating with thrombolytics: Ensure SBP<180 and DBP<105 prior to treatment with thrombolytics.  Administering thrombolytics after treatment with IV labetalol, hydralazine, or nicardipine is reasonable once BP control is established.    Endovascular Retrieval:  Not initiated due to absence of proximal vessel occlusion    National [employer] of [employer] Stroke Scale (Baseline)  Time Performed: 1445     Score    Level of consciousness: (0)   Alert, keenly responsive    LOC questions: (0)   Answers both questions correctly    LOC commands: (0)   Performs both tasks correctly    Best gaze: (0)   Normal    Visual: (0)   No visual loss    Facial palsy: (0)   Normal symmetrical movements    Motor arm (left): (0)   No drift    Motor arm (right):  (0)   No drift    Motor leg (left): (0)   No drift    Motor leg (right): (0)   No drift    Limb ataxia: (0)   Absent    Sensory: (0)   Normal- no sensory loss    Best language: (0)   Normal- no aphasia    Dysarthria: (0)   Normal    Extinction and inattention: (0)   No abnormality        Total Score:  0        Stroke Mimics were considered (including migraine headache, seizure disorder, hypoglycemia (or hyperglycemia), head or spinal trauma, CNS infection, Toxin ingestion and shock state (e.g. sepsis) .    Medical Decision Making:  Adelita is a 32 year old female who presented to the ED today with concerns of left arm numbness, episode of swallowing trouble, and presyncope. Patient was seen and treated 2 weeks ago here where a chronic lacunar infarct was found incidentally on CT. Today, patient's exam is unremarkable except for reported numbness and abnormal sensation in the patient's left hand. Laboratory tests are also grossly normal. There was elevated high sensitivity troponin test although this decreased to normal range with serial lab draw two hours later. EKG was normal with no signs of acute ischemic event or infarct. With the patient's recent history and her array of odd symptoms mentioned in the HPI, stroke neurology was consulted. They recommended MRI/MRA of brain and neck with contrast. This imaging showed evidence of previous mentioned CVA and an area of no flow corresponding with the left PICA. After talking further with Dr. Dulce Noble of stroke neuro, TIA cannot be ruled out. The plan will be to admit her to the hospital for observation and to be seen by neurology. She will be admitted under the care of Dr. Pitt, hospitalist for continued monitoring and neuro assessment. Plan was explained to patient and she is agreeable.     I staffed this patient with Dr. Lora Ramsay who is agreeable to assessment and plan.    Critical Care time:  was 0 minutes for this patient excluding  procedures.    Diagnosis:    ICD-10-CM    1. TIA (transient ischemic attack)  G45.9       2. Dysphagia, unspecified type  R13.10       3. Dizziness  R42       4. Near syncope  R55            Discharge Medications:  New Prescriptions    No medications on file      Gabby Foster PA-C on 2/1/2023 at 11:02 PM           Gabby Foster PA-C  02/01/23 2322

## 2023-02-01 NOTE — ED PROVIDER NOTES
ED ATTENDING PHYSICIAN NOTE:   I evaluated this patient in conjunction with Gabby Foster PA-C  I have participated in the care of the patient and personally performed key elements of the history, exam, and medical decision making.      HPI:   Adelita Bosch is a 32 year old female who presents with episode of difficulty swallowing today at 1400. The patient reports she had some numbness to her tongue as well. She states she tried to walk around and then started having lightheadedness and near syncopal. The patient reports that she then developed a headache to the posterior aspect of her head. The patient reports she intermittently for the past 2 weeks has been having chest pain, shortness of breath, headache and pain in her left arm. The patient was evaluated in the ED 1/15 for fever, chills, and headaches. At that time she was incidentally found to have a chronic cerebellar infarct and she was started on Augmentin for tonsillitis.     EXAM:   General: Well-nourished  Eyes: PERRL, conjunctivae pink no scleral icterus or conjunctival injection  ENT:  Moist mucus membranes, posterior oropharynx clear without erythema or exudates  Respiratory:  Lungs clear to auscultation bilaterally, no crackles/rubs/wheezes.  Good air movement  CV: Normal rate and rhythm, no murmurs/rubs/gallops  GI:  Abdomen soft and non-distended.  Normoactive BS.  No tenderness, guarding or rebound  Skin: Warm, dry.  No rashes or petechiae  Musculoskeletal: No peripheral edema or calf tenderness  Neuro: Alert and oriented to person/place/time  Psychiatric: Normal affect     ECG  ECG results from 02/01/23   EKG 12-lead, tracing only     Value    Systolic Blood Pressure     Diastolic Blood Pressure     Ventricular Rate 82    Atrial Rate 82    MD Interval 150    QRS Duration 72        QTc 427    P Axis 69    R AXIS 56    T Axis 50    Interpretation ECG      Sinus rhythm  Normal ECG  When compared with ECG of 15-ANUJ-2023 14:01,  MD  interval has increased  Vent. rate has decreased BY  76 BPM  ST no longer elevated in Inferior leads  ST no longer depressed in Anterior leads  T wave inversion no longer evident in Anterior leads  Confirmed by GENERATED REPORT, COMPUTER ([id number]),  Bib Vickers ([id number]) on 2/1/2023 5:05:13 PM         Consults:   1650 I spoke with Dr. Lexie Quezada fellow of stroke neurology regarding patient's presentation, findings, and plan of care.  She recommends MR/MRA of brain and neck with contrast.     2051 I spoke with Dr. Dulce Elizalde of stroke neurology regarding patient's presentation, findings, and plan of care.     2111 I spoke with Dr. Dulce Elizalde of stroke neurology regarding patient's presentation, findings, and plan of care.      2120 I spoke with Dr. Pitt of the Hospitalist service from [facility] regarding patient's presentation, findings, and plan of care.        MEDICAL DECISION MAKING/ASSESSMENT AND PLAN:   Adelita Bosch is a 32 year old female with a recent diagnosis of a chronic cerebellar infarct as well as a recent tonsillitis infection and underlying rheumatoid arthritis who comes today with an episode of difficulty in swallowing as well as dizziness.  She had associated chest pain, shortness of breath, headache and pain in her left arm at the same time.  Symptoms had all resolved by the time she had arrived.  Cardiac work-up showed nonischemic EKG.  Initial high-sensitivity troponin very slightly elevated above our reference range and the second high-sensitivity troponin was actually nondetectable.  It is unclear to me if this represents any signs of a cardiac process.  Lungs are clear.  She is saturating well and she is not tachycardic.  No leg swelling.  I do not believe this represents a pulmonary embolism.  Given her known history of a stroke without an apparent etiology, I was concerned about the possibility that the episode of dysphagia and dizziness represented a  TIA.  I did consult immediately with stroke neurology.  They recommended MRI MRA.  This was obtained.  She does appear to have no flow through the left PICA.  Etiology is unclear.  I discussed all of these findings as well as her history and examination with the stroke neurology team who recommended that we admit her under observation status for further evaluation and stroke neurology consultation as this could represent a TIA and she is not low risk given her known chronic cerebellar infarct.  Patient was in agreement with the plan.  Dr. Pitt graciously agreed to admit the patient.    DIAGNOSIS:     ICD-10-CM    1. TIA (transient ischemic attack)  G45.9       2. Dysphagia, unspecified type  R13.10       3. Dizziness  R42       4. Near syncope  R55           DISPOSITION:   The patient was admitted to the hospital under the care of Dr. Pitt.        Scribe Disclosure:  I, Michelle Sanders, am serving as a scribe at 4:23 PM on 2/1/2023 to document services personally performed by Lora Ramsay MD based on my observations and the provider's statements to me.     2/1/2023  [facility] EMERGENCY DEPT       Lora Ramsay MD  02/01/23 0066

## 2023-02-02 ENCOUNTER — APPOINTMENT (OUTPATIENT)
Dept: CT IMAGING | Facility: CLINIC | Age: 33
End: 2023-02-02
Payer: MEDICAID

## 2023-02-02 ENCOUNTER — APPOINTMENT (OUTPATIENT)
Dept: CARDIOLOGY | Facility: CLINIC | Age: 33
End: 2023-02-02
Attending: INTERNAL MEDICINE
Payer: MEDICAID

## 2023-02-02 ENCOUNTER — APPOINTMENT (OUTPATIENT)
Dept: SPEECH THERAPY | Facility: CLINIC | Age: 33
End: 2023-02-02
Attending: INTERNAL MEDICINE
Payer: MEDICAID

## 2023-02-02 VITALS
OXYGEN SATURATION: 99 % | TEMPERATURE: 98.2 F | RESPIRATION RATE: 16 BRPM | HEART RATE: 88 BPM | DIASTOLIC BLOOD PRESSURE: 62 MMHG | SYSTOLIC BLOOD PRESSURE: 105 MMHG

## 2023-02-02 LAB
APTT PPP: 31 SECONDS (ref 22–38)
AT III ACT/NOR PPP CHRO: 100 % (ref 85–135)
FACTOR 2 INTERPRETATION: NORMAL
FACTOR V INTERPRETATION: NORMAL
GLUCOSE BLDC GLUCOMTR-MCNC: 96 MG/DL (ref 70–99)
INR PPP: 1.06 (ref 0.85–1.15)
LAB DIRECTOR COMMENTS: NORMAL
LAB DIRECTOR DISCLAIMER: NORMAL
LAB DIRECTOR INTERPRETATION: NORMAL
LAB DIRECTOR METHODOLOGY: NORMAL
LAB DIRECTOR RESULTS: NORMAL
LVEF ECHO: NORMAL
PROT C ACT/NOR PPP CHRO: 109 % (ref 70–170)
PROT S FREE AG ACT/NOR PPP IA: 86 % (ref 55–125)
SPECIMEN DESCRIPTION: NORMAL
TROPONIN T SERPL HS-MCNC: <6 NG/L
TSH SERPL DL<=0.005 MIU/L-ACNC: 2.6 UIU/ML (ref 0.3–4.2)

## 2023-02-02 PROCEDURE — 86146 BETA-2 GLYCOPROTEIN ANTIBODY: CPT | Performed by: INTERNAL MEDICINE

## 2023-02-02 PROCEDURE — 36415 COLL VENOUS BLD VENIPUNCTURE: CPT | Performed by: INTERNAL MEDICINE

## 2023-02-02 PROCEDURE — 84484 ASSAY OF TROPONIN QUANT: CPT | Performed by: INTERNAL MEDICINE

## 2023-02-02 PROCEDURE — 85306 CLOT INHIBIT PROT S FREE: CPT | Performed by: INTERNAL MEDICINE

## 2023-02-02 PROCEDURE — 250N000013 HC RX MED GY IP 250 OP 250 PS 637: Performed by: INTERNAL MEDICINE

## 2023-02-02 PROCEDURE — 81240 F2 GENE: CPT | Performed by: INTERNAL MEDICINE

## 2023-02-02 PROCEDURE — 85300 ANTITHROMBIN III ACTIVITY: CPT | Performed by: INTERNAL MEDICINE

## 2023-02-02 PROCEDURE — 99222 1ST HOSP IP/OBS MODERATE 55: CPT | Mod: GC | Performed by: PSYCHIATRY & NEUROLOGY

## 2023-02-02 PROCEDURE — 93306 TTE W/DOPPLER COMPLETE: CPT | Mod: 26 | Performed by: INTERNAL MEDICINE

## 2023-02-02 PROCEDURE — 70498 CT ANGIOGRAPHY NECK: CPT

## 2023-02-02 PROCEDURE — 85610 PROTHROMBIN TIME: CPT | Performed by: INTERNAL MEDICINE

## 2023-02-02 PROCEDURE — 250N000009 HC RX 250: Performed by: INTERNAL MEDICINE

## 2023-02-02 PROCEDURE — 85303 CLOT INHIBIT PROT C ACTIVITY: CPT | Performed by: INTERNAL MEDICINE

## 2023-02-02 PROCEDURE — 85390 FIBRINOLYSINS SCREEN I&R: CPT | Mod: 26 | Performed by: PATHOLOGY

## 2023-02-02 PROCEDURE — 86147 CARDIOLIPIN ANTIBODY EA IG: CPT | Performed by: INTERNAL MEDICINE

## 2023-02-02 PROCEDURE — 92610 EVALUATE SWALLOWING FUNCTION: CPT | Mod: GN | Performed by: SPEECH-LANGUAGE PATHOLOGIST

## 2023-02-02 PROCEDURE — 93306 TTE W/DOPPLER COMPLETE: CPT

## 2023-02-02 PROCEDURE — 82962 GLUCOSE BLOOD TEST: CPT

## 2023-02-02 PROCEDURE — 70496 CT ANGIOGRAPHY HEAD: CPT

## 2023-02-02 PROCEDURE — G0378 HOSPITAL OBSERVATION PER HR: HCPCS

## 2023-02-02 PROCEDURE — 85730 THROMBOPLASTIN TIME PARTIAL: CPT | Performed by: INTERNAL MEDICINE

## 2023-02-02 PROCEDURE — 999N000147 HC STATISTIC PT IP EVAL DEFER: Performed by: PHYSICAL THERAPIST

## 2023-02-02 PROCEDURE — 250N000012 HC RX MED GY IP 250 OP 636 PS 637: Performed by: INTERNAL MEDICINE

## 2023-02-02 PROCEDURE — 250N000011 HC RX IP 250 OP 636: Performed by: INTERNAL MEDICINE

## 2023-02-02 PROCEDURE — G0452 MOLECULAR PATHOLOGY INTERPR: HCPCS | Mod: 26 | Performed by: PATHOLOGY

## 2023-02-02 PROCEDURE — 99239 HOSP IP/OBS DSCHRG MGMT >30: CPT | Performed by: INTERNAL MEDICINE

## 2023-02-02 PROCEDURE — 92526 ORAL FUNCTION THERAPY: CPT | Mod: GN | Performed by: SPEECH-LANGUAGE PATHOLOGIST

## 2023-02-02 RX ORDER — IOPAMIDOL 755 MG/ML
75 INJECTION, SOLUTION INTRAVASCULAR ONCE
Status: COMPLETED | OUTPATIENT
Start: 2023-02-02 | End: 2023-02-02

## 2023-02-02 RX ADMIN — ASPIRIN 81 MG: 81 TABLET, COATED ORAL at 09:00

## 2023-02-02 RX ADMIN — PREDNISONE 2.5 MG: 2.5 TABLET ORAL at 09:00

## 2023-02-02 RX ADMIN — SODIUM CHLORIDE 90 ML: 900 INJECTION INTRAVENOUS at 10:36

## 2023-02-02 RX ADMIN — IOPAMIDOL 75 ML: 755 INJECTION, SOLUTION INTRAVENOUS at 10:35

## 2023-02-02 ASSESSMENT — ACTIVITIES OF DAILY LIVING (ADL)
ADLS_ACUITY_SCORE: 31

## 2023-02-02 NOTE — PLAN OF CARE
Low-carb diet.  Regular exercise.  Recheck labs   Pt here with stroke symptoms, recent CVA (2 wks ago). A&Ox4. Neuros intact except LUE numbness to forearm and hand, pt stated at home she was having vision changes at time when up and moving around. VSS on RA. Reg diet, thin liquids. Up with SBA. Mild headache pain. Pt scoring green on the Aggression Stop Light Tool. Plan for echo. Discharge pending.

## 2023-02-02 NOTE — PROVIDER NOTIFICATION
"Notified neuro team via web page that patient was having numbness to back of head and\" racing heart feeling\"  "

## 2023-02-02 NOTE — CONSULTS
"  [facility]    Stroke Telephone Note    I was called by Lora Ramsay on 02/01/23 regarding patient Adelita Bosch. The patient is a 32 year old female with known hx of RA and Sjjogren syndrome  who presented with occipital headaches and extremity numbness for 2 weeks. She is here today for experiencing new difficulty swallowing that started at 1400 this afternoon. Patient has been recently diagnosed with tonsillitis where head CT showed a lacunar infarct. Swallowing issue is new and not experienced before.    Imaging Findings   Pending brain MRA and MRI    Impression  New episode of swallowing difficulty with occipital headaches and extremity numbness    Recommendations   Please obtain brain MRI WWOUT contrast  Please obtain MRA head and neck    My recommendations are based on the information provided over the phone by Adelita Bosch's in-person providers. They are not intended to replace the clinical judgment of her in-person providers. I was not requested to personally see or examine the patient at this time.    The Stroke Staff is Dr. Mosquera.    Lexie Quezada MD  Vascular Neurology Fellow    To page me or covering stroke neurology team member, click here: AMCOM  Choose \"On Call\" tab at top, then select \"NEUROLOGY/ALL SITES\" from middle drop-down box, press Enter, then look for \"stroke\" or \"telestroke\" for your site.      "

## 2023-02-02 NOTE — PLAN OF CARE
Reason for Admission: TIA    Cognitive/Mentation: A/Ox 4  Neuros/CMS: Intact   VS: stable.   Tele: NSR.  GI: BS positive,  flatus, . Continent.  :  Continent.  Pulmonary: LS clear.  Pain: denies.     Drains/Lines: PIV  Skin: intact  Activity:  Independent.  Diet: regular with thin liquids. Takes pills whole.     Therapies recs: home  Discharge: home later today    Aggression Stoplight Tool: green    End of shift summary: patient will discharge this afternoon

## 2023-02-02 NOTE — PROGRESS NOTES
Brief Clinical Note                                                                     IMPRESSION:  HEAD MRI:  1.  No acute ischemia, mass/mass effect, or abnormal intracranial enhancement.   2.  Chronic left inferior cerebellar infarct.     HEAD MRA:  1.  No proximal cerebral artery branch vessel occlusion, aneurysm, or vascular malformation.   2.  No flow related enhancement corresponding with the left PICA. While this can be seen due to diminutive vessel caliber/hypoplasia, the presence of chronic ipsilateral PICA territory infarct may indicate a component of underlying chronic flow   compromise.     NECK MRA:  1.  No flow-limiting stenosis or findings of dissection.      Impression:  Chronic L cerebellum infarct- unclear if she has been evaluated. She has had difficulty with swallowing today. She had negative MRI/MRA that was negative. It is unclear if this episode is a TIA.     Plan:  -Admit for observation  -Q4H neuro checks  -Aspirin 81mg  -Echo    Dulce Elizalde  Vascular Stroke Fellow     Stroke staff is Dr. Mosquera

## 2023-02-02 NOTE — CONSULTS
[facility]    Stroke Consult Note    Reason for Consult:      Chief Complaint: Palpitations and Syncope       HPI  Adelita Bosch is a 32 year old female with known history RA, Sjogren Syndrome and TB. Patient had been recently seen by Brenda Brown from Neurology on 1/19 due to tremors and paresthesia to hand and face. Along with occipital headaches, myalgias. Patient was seen yesterday in ED for suspicion of new dysphagia starting in the afternoon. When clarifying more info from patient, she has been having intermittent dysphagia for a few months now for both liquids and solids. It was a similar episode that she experienced yesterday but more severe than her usual episodes. Imaging MRI showed a L chronic appearing cerebellar stroke. In addition to L PICA occlusion. CTA repeated confirming same findings.     Imaging Review:  CTA IMPRESSION:  1. No high-grade stenosis or large vessel occlusion involving the  major intracranial arteries.  2. Probable variant arterial supply of the left PICA territory, as  described.  3. Patent cervical carotid and vertebral arteries without stenosis or  dissection.  4. No intracranial aneurysm or high-flow vascular malformation.    IMPRESSION:  HEAD MRI:  1.  No acute ischemia, mass/mass effect, or abnormal intracranial enhancement.   2.  Chronic left inferior cerebellar infarct.     HEAD MRA:  1.  No proximal cerebral artery branch vessel occlusion, aneurysm, or vascular malformation.   2.  No flow related enhancement corresponding with the left PICA. While this can be seen due to diminutive vessel caliber/hypoplasia, the presence of chronic ipsilateral PICA territory infarct may indicate a component of underlying chronic flow   compromise.     NECK MRA:  1.  No flow-limiting stenosis or findings of dissection.    Impression  1. Mild oral/pharyngeal dysphagia, not related to vascular etiology in the setting of known Sjogren/RA.  On bedside swallow  "exam, patient has signs of mild oral pharyngeal dysphagia     2. Evidence of old L cerebellar stroke, with L PICA occlusion, ESUS etiology. Obtained CTA to rule out dissection of PICA which was not convincing for dissection. Needs aspirin as secondary strokep revention. Ordered Hypercoagulable workup.      Recommendations  Patient ok to discharge from stroke team stand point  Needs to be on aspirin 81 mg daily for evidence of old cerebellar stroke  Follow up with speech study to complete swallow study.  Follow up hyper coagulable workup on discharge  Follow up with general neurology clinic ( was seen by Dr. Gutierrez in the past).    Patient Follow-up    - in 6-8 weeks with general neurology ([phone])    Thank you for this consult. No further stroke evaluation is recommended, so we will sign off. Please contact us with any additional questions.    The Stroke Staff is Dr. Mosquera.    Lexie Quezada MD  Vascular Neurology Fellow    To page me or covering stroke neurology team member, click here: AMCOM  Choose \"On Call\" tab at top, then select \"NEUROLOGY/ALL SITES\" from middle drop-down box, press Enter, then look for \"stroke\" or \"telestroke\" for your site.    _____________________________________________________       Past Medical History   Past Medical History:   Diagnosis Date     Rheumatoid arthritis (H)      Past Surgical History   No past surgical history on file.  Medications   Home Meds  Prior to Admission medications    Medication Sig Start Date End Date Taking? Authorizing Provider   albuterol (PROAIR HFA/PROVENTIL HFA/VENTOLIN HFA) 108 (90 Base) MCG/ACT inhaler Inhale 2 puffs into the lungs every 6 hours as needed for shortness of breath, wheezing or cough   Yes Unknown, Entered By History   CALCIUM PO Take 1 tablet by mouth daily   Yes Unknown, Entered By History   Cyanocobalamin (VITAMIN B-12 PO) Take 1 tablet by mouth daily   Yes Unknown, Entered By History   MAGNESIUM PO Take 1 tablet by mouth daily   " Yes Unknown, Entered By History   POTASSIUM PO Take 1 tablet by mouth daily   Yes Unknown, Entered By History   predniSONE (DELTASONE) 2.5 MG tablet Take 2.5 mg by mouth daily For RA. When feeling a flare up, take 2 tabs (5mg) daily.   Yes Unknown, Entered By History   VITAMIN A PO Take 1 tablet by mouth daily   Yes Unknown, Entered By History   VITAMIN D PO Take 1 tablet by mouth daily   Yes Unknown, Entered By History   certolizumab pegol (CIMZIA) 2 X 200 MG injection 2 vials/kit Inject 1 Syringe Subcutaneous every 14 days    Unknown, Entered By History   hydroxychloroquine (PLAQUENIL) 200 MG tablet Take 300 mg by mouth daily    Unknown, Entered By History   sertraline (ZOLOFT) 25 MG tablet Take 25 mg by mouth daily    Unknown, Entered By History       Scheduled Meds    aspirin  81 mg Oral Daily     predniSONE  2.5 mg Oral Daily       Infusion Meds      PRN Meds  acetaminophen **OR** acetaminophen, melatonin, ondansetron **OR** ondansetron, polyethylene glycol, senna-docusate **OR** senna-docusate    Allergies   No Known Allergies  Family History   No family history on file.  Social History   Social History     Tobacco Use     Smoking status: Never   Substance Use Topics     Alcohol use: Yes     Comment: rare     Drug use: Never       Review of Systems   The 10 point Review of Systems is negative other than noted in the HPI or here.        PHYSICAL EXAMINATION   Temp:  [98  F (36.7  C)-98.2  F (36.8  C)] 98.2  F (36.8  C)  Pulse:  [67-97] 67  Resp:  [16-20] 16  BP: (100-117)/(61-71) 100/61  SpO2:  [98 %-100 %] 100 %    Neurologic  Mental Status:  alert, oriented x 3, follows commands, speech clear and fluent, naming and repetition normal  Cranial Nerves:  visual fields intact, PERRL, EOMI with normal smooth pursuit, facial sensation intact and symmetric, facial movements symmetric, hearing not formally tested but intact to conversation, palate elevation symmetric and uvula midline, no dysarthria, shoulder shrug  strong bilaterally, tongue protrusion midline  Motor:  normal muscle tone and bulk, no abnormal movements, able to move all limbs spontaneously, strength 5/5 throughout upper and lower extremities, no pronator drift  Reflexes:  toes down-going  Sensory:  light touch sensation intact and symmetric throughout upper and lower extremities, no extinction on double simultaneous stimulation   Coordination:  normal finger-to-nose and heel-to-shin bilaterally without dysmetria, rapid alternating movements symmetric  Station/Gait:  deferred    Dysphagia Screen  Per Nursing    Stroke Scales    NIHSS  1a. Level of Consciousness 0-->Alert, keenly responsive   1b. LOC Questions 0-->Answers both questions correctly   1c. LOC Commands 0-->Performs both tasks correctly   2.   Best Gaze 0-->Normal   3.   Visual 0-->No visual loss   4.   Facial Palsy 0-->Normal symmetrical movements   5a. Motor Arm, Left 0-->No drift, limb holds 90 (or 45) degrees for full 10 secs   5b. Motor Arm, Right 0-->No drift, limb holds 90 (or 45) degrees for full 10 secs   6a. Motor Leg, Left 0-->No drift, leg holds 30 degree position for full 5 secs   6b. Motor Leg, right 0-->No drift, leg holds 30 degree position for full 5 secs   7.   Limb Ataxia 0-->Absent   8.   Sensory 0-->Normal, no sensory loss   9.   Best Language 0-->No aphasia, normal   10. Dysarthria 0-->Normal   11. Extinction and Inattention  0-->No abnormality   Total 0 (02/02/23 1331)           Imaging  I personally reviewed all imaging; relevant findings per HPI.    Labs Data   CBC  Recent Labs   Lab 02/01/23  1553   WBC 6.1   RBC 4.16   HGB 13.7   HCT 39.6        Basic Metabolic Panel   Recent Labs   Lab 02/01/23  1553      POTASSIUM 3.6   CHLORIDE 104   CO2 26   BUN 8.1   CR 0.59   GLC 85   CATALINA 9.1     Liver Panel  Recent Labs   Lab 02/01/23  1553   PROTTOTAL 7.3   ALBUMIN 4.3   BILITOTAL 0.8   ALKPHOS 56   AST 17   ALT 11     INR  No lab results found.   Lipid Profile     Recent Labs   Lab Test 01/25/23  1131   CHOL 177   HDL 60   LDL 93   TRIG 118     A1C    Recent Labs   Lab Test 01/25/23  1131   A1C 5.0     Troponin    Recent Labs   Lab 02/01/23  1923 02/01/23  1553   CTROPT <6 35*          Stroke Consult Data Data   This was a non-emergent, non-telestroke consult.

## 2023-02-02 NOTE — PHARMACY-ADMISSION MEDICATION HISTORY
Pharmacy Medication History  Admission medication history interview status for the 2/1/2023  admission is complete. See EPIC admission navigator for prior to admission medications     Location of Interview: Patient room  Medication history sources: Patient and Surescripts    Significant changes made to the medication list:  Added all medications    In the past week, patient estimated taking medication this percent of the time: 50-90% due to other    Medication Affordability:   Did not assess    Additional medication history information:   - Pt reports that she hasn't taken her cimzia injection x1 month because she thought it might be causing adverse side effects (part of presenting sx)  - Pt reports that she hasn't been taking her hydroxychloroquine x2 weeks because she thought it might be causing adverse side effects (part of presenting sx)  - Sertraline was prescribed because the last time she visited the ED they thought it might just be anxiety, pt stated that sertraline has not helped with tremors.   - Pt reports that prednisone is a scheduled medications, and that she normally takes 2.5mg daily and takes 5mg daily when she feels her RA flaring up. She has been taking 5mg x2 days because she has felt her joints flaring up.    Medication reconciliation completed by provider prior to medication history? No    Time spent in this activity: 25 minutes    Prior to Admission medications    Medication Sig Last Dose Taking? Auth Provider Long Term End Date   albuterol (PROAIR HFA/PROVENTIL HFA/VENTOLIN HFA) 108 (90 Base) MCG/ACT inhaler Inhale 2 puffs into the lungs every 6 hours as needed for shortness of breath, wheezing or cough prn at prn Yes Unknown, Entered By History Yes    CALCIUM PO Take 1 tablet by mouth daily 1/31/2023 Yes Unknown, Entered By History     Cyanocobalamin (VITAMIN B-12 PO) Take 1 tablet by mouth daily 1/31/2023 Yes Unknown, Entered By History     MAGNESIUM PO Take 1 tablet by mouth daily  1/31/2023 Yes Unknown, Entered By History     POTASSIUM PO Take 1 tablet by mouth daily 1/31/2023 Yes Unknown, Entered By History     predniSONE (DELTASONE) 2.5 MG tablet Take 2.5 mg by mouth daily For RA. When feeling a flare up, take 2 tabs (5mg) daily. 1/31/2023 at x2 tabs (5mg) Yes Unknown, Entered By History     VITAMIN A PO Take 1 tablet by mouth daily 1/31/2023 Yes Unknown, Entered By History     VITAMIN D PO Take 1 tablet by mouth daily 1/31/2023 Yes Unknown, Entered By History     certolizumab pegol (CIMZIA) 2 X 200 MG injection 2 vials/kit Inject 1 Syringe Subcutaneous every 14 days   Unknown, Entered By History     hydroxychloroquine (PLAQUENIL) 200 MG tablet Take 300 mg by mouth daily   Unknown, Entered By History     sertraline (ZOLOFT) 25 MG tablet Take 25 mg by mouth daily   Unknown, Entered By History Yes        The information provided in this note is only as accurate as the sources available at the time of update(s)

## 2023-02-02 NOTE — DISCHARGE SUMMARY
"[facility]    Discharge Summary  Hospitalist    Date of Admission:  2/1/2023  Date of Discharge:  2/2/2023  Discharging Provider: Bertha Dowd DO    Discharge Diagnoses   Chronic left cerebellar CVA with L PICA occlusion, ESUS  Mild oropharyngeal dysphagia  Elevated troponin: Resolved  Near syncope  Palpitations  RA  Sjogren's syndrome  Anxiety  Hx of panic attack in 2/2022  Hx of TB, s/p treatment    History of Present Illness   Adelita Bosch is an 32 year old female with PMHx of rheumatoid arthritis, Sjogren's syndrome, anxiety and hx of TB s/p treatment who was admitted under observation status on 2/1/2023 for evaluation of near syncope and dysphagia, with clinical picture concerning for TIA vs CVA.     Hospital Course   Adelita Bosch was admitted on 2/1/2023.  The following problems were addressed during her hospitalization:     Chronic left cerebellar CVA with L PICA occlusion, ESUS  * Had been seen in ED on 1/15/23 for evaluation of tremors and paraesthesias of the hand/face. At that time, head CT showed a chronic lacunar infarct in the left cerebellum.   * Was seen by  neurology on 1/19/23 for follow up. Was also noted to have some mild left sided weakness, intermittent chest pressure and palpitations. Was started on ASA 81mg daily. Recommended further evaluation with MRI, echocardiogram, cardiac event monitor, EEG and sleep study.   * Presented back to ED on 2/1 with headache, lightheadedness and left arm numbness x2 wks. On 2/1, she developed difficulty swallowing, no pain. Also reported ear pain, balance issues and chest pain.   * In ED, was afebrile and VSS. MRI brain and MRA head/neck were obtained -- notable for previously noted chronic left inferior cerebellar infarct; MRA notable for \"No flow related enhancement corresponding with the left PICA. While this can be seen due to diminutive vessel caliber/hypoplasia, the presence of chronic ipsilateral PICA " "territory infarct may indicate a component of underlying chronic flow compromise.\"  * Seen by stroke team. Further evaluation with CTA head/neck on 2/1 showed a probable variant arterial supply of the left PICA territory. No evidence of dissection.   -- stroke team recommended to cont ASA 81mg daily for secondary stroke prevention; hypercoag workup ordered per stroke team.   -- OP SLP as below  -- sched to have OP cardiac event monitor placed on 2/3/23  -- follow up with general neurology in clinic in 6-8 wks (# [phone], was seen by Dr. Gutierrez in the past)     Mild oropharyngeal dysphagia  * Not felt to be related to vascular etiology. Unclear if secondary to RA/Sjogern's syndrome.   * On bedside swallow study had signs of mild oropharyngeal dysphagia. Seen by SLP this stay, VFSS ordered, can be done in the outpatient setting. SLP also recommended an esophogram. Okay'd for regular diet w/thin liquids    Elevated troponin: Resolved  Near syncope  Palpitations  * Has noted hx of episodes of dizziness and near syncope. Also with some tachycardia.   * On presentation this stay, reported some chest and arm pain. EKG showed NSR. Trop 35 -->  < 6 --> < 6  on rechecks. ProBNP nl. TSH nl.  * Monitored on telemetry this stay, no arrhythmias noted  * Echocardiogram this stay showed EF 65-70% with no WMAs, no valve disease. Overall normal study.   -- as above, sched to have OP cardiac event monitor placed on 2/3/23     RA  Sjogren's syndrome  * Home meds include prednisone 2.5mg daily for RA (increases to 5mg daily for RA flare), plaquenil 300mg daily, certolizumab 200mg q2 weeks  * Patient told pharmacist on admission she hadn't been taking certolizumab and hydroxychloroquine in the 2-4 wks prior to admission dt concern those meds might be causing adverse side effects.   -- follow up with primary rheumatologist after discharge     Anxiety  Hx of panic attack in 2/2022  * Was in ED with apparent panic attack 2/2022 and " prescribed prn Atarax.   * Noted to have some anxiety in ED this stay.   * Chronic and stable on sertraline 25mg daily.     Hx of TB, s/p treatment  * Noted. No concerns this stay.      Bertha Dowd DO    Pending Results   These results will be followed up by stroke team  Unresulted Labs Ordered in the Past 30 Days of this Admission     Date and Time Order Name Status Description    2/2/2023  9:32 AM Lupus Anticoagulant Panel In process     2/2/2023  9:32 AM Protein C chromogenic In process     2/2/2023  9:32 AM Protein S Antigen Free In process     2/2/2023  9:32 AM Cardiolipin Mounika IgG and IgM In process     2/2/2023  9:32 AM Beta 2 Glycoprotein 1 Antibody IgM In process     2/2/2023  9:32 AM Beta 2 Glycoprotein 1 Antibody IgG In process     2/2/2023  9:32 AM Antithrombin III In process           Code Status   Full Code       Primary Care Physician   Physician No Ref-Primary    Physical Exam   Temp: 98.2  F (36.8  C) Temp src: Oral BP: 105/62 Pulse: 88   Resp: 16 SpO2: 99 % O2 Device: None (Room air)    There were no vitals filed for this visit.  Vital Signs with Ranges  Temp:  [98  F (36.7  C)-98.2  F (36.8  C)] 98.2  F (36.8  C)  Pulse:  [67-97] 88  Resp:  [16-20] 16  BP: (100-117)/(61-71) 105/62  SpO2:  [98 %-100 %] 99 %  No intake/output data recorded.    General: Resting comfortably, alert, conversive, NAD  CVS: HRRR, no MGR, no LE edema  Respiratory: CTAB, no wheeze/rales/rhonchi, no increased work of breathing  GI: S, NT, ND, +BS  Skin: Warm/dry  Neuro: CNd 2-12 intact, no focal motor/sensory deficits    Discharge Disposition   Discharged to home  Condition at discharge: Stable    Consultations This Hospital Stay   NEUROLOGY IP STROKE CONSULT  PHYSICAL THERAPY ADULT IP CONSULT  SPEECH LANGUAGE PATH ADULT IP CONSULT    Time Spent on this Encounter   I, Bertha Dowd DO, personally saw the patient today and spent greater than 30 minutes discharging this patient.    Discharge Orders       Speech Therapy Referral      Reason for your hospital stay    Evaluation of your neurologic symptoms and chest discomfort.  Your MRI showed an old stroke in your left cerebellum, no new strokes. You were started on a daily baby aspirin. You will follow up with speech therapy for ongoing swallowing evaluation.     Activity    Your activity upon discharge: activity as tolerated     Follow-up and recommended labs and tests     Follow up with your PCP in 1-2 weeks  Follow up with stroke team in 6-8 weeks.  Follow up with your rheumatologist as scheduled.  Follow up with outpatient speech therapist for further evaluation with a video swallow study.     Diet    Follow this diet upon discharge: Regular     Stroke Hospital Follow Up    [facility] will call you to coordinate care as prescribed by your provider. If you don t hear from a representative within 2 business days, please call [phone].       Discharge Medications   Current Discharge Medication List      START taking these medications    Details   aspirin (ASA) 81 MG EC tablet Take 1 tablet (81 mg) by mouth daily    Associated Diagnoses: Cerebrovascular accident (CVA), unspecified mechanism (H)         CONTINUE these medications which have NOT CHANGED    Details   albuterol (PROAIR HFA/PROVENTIL HFA/VENTOLIN HFA) 108 (90 Base) MCG/ACT inhaler Inhale 2 puffs into the lungs every 6 hours as needed for shortness of breath, wheezing or cough      CALCIUM PO Take 1 tablet by mouth daily      Cyanocobalamin (VITAMIN B-12 PO) Take 1 tablet by mouth daily      MAGNESIUM PO Take 1 tablet by mouth daily      POTASSIUM PO Take 1 tablet by mouth daily      predniSONE (DELTASONE) 2.5 MG tablet Take 2.5 mg by mouth daily For RA. When feeling a flare up, take 2 tabs (5mg) daily.      VITAMIN A PO Take 1 tablet by mouth daily      VITAMIN D PO Take 1 tablet by mouth daily      certolizumab pegol (CIMZIA) 2 X 200 MG injection 2 vials/kit Inject 1 Syringe Subcutaneous  every 14 days      hydroxychloroquine (PLAQUENIL) 200 MG tablet Take 300 mg by mouth daily      sertraline (ZOLOFT) 25 MG tablet Take 25 mg by mouth daily           Allergies   No Known Allergies  Data   Most Recent 3 CBC's:Recent Labs   Lab Test 02/01/23  1553 01/15/23  1421 02/22/22  0300   WBC 6.1 17.8* 9.5   HGB 13.7 14.8 15.5   MCV 95 91 96    165 182      Most Recent 3 BMP's:  Recent Labs   Lab Test 02/02/23  1247 02/01/23  1553 01/15/23  1421 02/22/22  0300   NA  --  139 132* 140   POTASSIUM  --  3.6 3.8 3.4   CHLORIDE  --  104 102 110*   CO2  --  26 24 24   BUN  --  8.1 5* 11   CR  --  0.59 0.67 0.70   ANIONGAP  --  9 6 6   CATALINA  --  9.1 9.3 9.2   GLC 96 85 109* 102*     Most Recent 2 LFT's:  Recent Labs   Lab Test 02/01/23  1553 01/15/23  1421   AST 17 12   ALT 11 16   ALKPHOS 56 70   BILITOTAL 0.8 2.0*       Most Recent Cholesterol Panel:  Recent Labs   Lab Test 01/25/23  1131   CHOL 177   LDL 93   HDL 60   TRIG 118     Most Recent TSH, T4 and A1c Labs:  Recent Labs   Lab Test 02/01/23  1923 01/25/23  1131   TSH 2.60  --    A1C  --  5.0     Results for orders placed or performed during the hospital encounter of 02/01/23   MR Brain w/o & w Contrast    Narrative    EXAM: MR BRAIN W/O and W CONTRAST, MRA NECK (CAROTIDS) W/O and W CONTRAST, MRA BRAIN ([place]) W/O CONTRAST  LOCATION: [facility]  DATE/TIME: 2/1/2023 7:15 PM    INDICATION: Neurologic problem. Dysphagia.  COMPARISON: CTA head 01/15/2023.  CONTRAST: gadobutrol (GADAVIST) injection 10 mL  TECHNIQUE:   1) Routine multiplanar multisequence head MRI without and with intravenous contrast.  2) 3D time-of-flight head MRA without intravenous contrast.  3) Neck MRA without and with IV contrast. Stenosis measurements made according to NASCET criteria unless otherwise specified.    FINDINGS:  HEAD MRI:  INTRACRANIAL CONTENTS: Chronic lacunar infarct in left cerebellum. No acute ischemia or edema. No mass, acute  hemorrhage, or extra-axial fluid collections. Normal brain parenchymal signal. Normal ventricles and sulci. Normal position of the cerebellar   tonsils. No pathologic contrast enhancement. CSF signal prominence in the left middle cranial fossa measuring up to approximately 1.8 x 3.6 cm transverse compatible with incidental arachnoid cyst.    SELLA: No abnormality accounting for technique.    OSSEOUS STRUCTURES/SOFT TISSUES: Normal marrow signal.    ORBITS: No visible intraorbital abnormality.     SINUSES/MASTOIDS: Mild patchy left ethmoid sinus mucosal thickening. Tiny mucous retention cyst right maxillary sinus. No middle ear or mastoid effusion.    HEAD MRA:   ANTERIOR CIRCULATION: No proximal branch vessel occlusion or flow-limiting stenosis. No aneurysm or high-flow vascular malformation.    POSTERIOR CIRCULATION: No visible flow related enhancement to correspond with the left PICA artery. Otherwise, no proximal branch vessel occlusion or flow-limiting stenosis. No aneurysm or high-flow vascular malformation. Tiny patent bilateral posterior   communicating arteries.    NECK MRA:   RIGHT CAROTID: Patent without flow-limiting stenosis by NASCET criteria at the carotid bifurcation. No dissection.    LEFT CAROTID: Patent without flow-limiting stenosis by NASCET criteria at the carotid bifurcation. No dissection.    VERTEBRAL ARTERIES: Codominant vertebral arteries. No flow-limiting stenosis or dissection.    AORTIC ARCH: There is common origin of the brachiocephalic and left common carotid arteries. Visualized great vessels are widely patent.      Impression    IMPRESSION:  HEAD MRI:  1.  No acute ischemia, mass/mass effect, or abnormal intracranial enhancement.   2.  Chronic left inferior cerebellar infarct.    HEAD MRA:  1.  No proximal cerebral artery branch vessel occlusion, aneurysm, or vascular malformation.   2.  No flow related enhancement corresponding with the left PICA. While this can be seen due to  diminutive vessel caliber/hypoplasia, the presence of chronic ipsilateral PICA territory infarct may indicate a component of underlying chronic flow   compromise.    NECK MRA:  1.  No flow-limiting stenosis or findings of dissection.     MRA Neck (Carotids) wo & w Contrast    Narrative    EXAM: MR BRAIN W/O and W CONTRAST, MRA NECK (CAROTIDS) W/O and W CONTRAST, MRA BRAIN ([place]) W/O CONTRAST  LOCATION: [facility]  DATE/TIME: 2/1/2023 7:15 PM    INDICATION: Neurologic problem. Dysphagia.  COMPARISON: CTA head 01/15/2023.  CONTRAST: gadobutrol (GADAVIST) injection 10 mL  TECHNIQUE:   1) Routine multiplanar multisequence head MRI without and with intravenous contrast.  2) 3D time-of-flight head MRA without intravenous contrast.  3) Neck MRA without and with IV contrast. Stenosis measurements made according to NASCET criteria unless otherwise specified.    FINDINGS:  HEAD MRI:  INTRACRANIAL CONTENTS: Chronic lacunar infarct in left cerebellum. No acute ischemia or edema. No mass, acute hemorrhage, or extra-axial fluid collections. Normal brain parenchymal signal. Normal ventricles and sulci. Normal position of the cerebellar   tonsils. No pathologic contrast enhancement. CSF signal prominence in the left middle cranial fossa measuring up to approximately 1.8 x 3.6 cm transverse compatible with incidental arachnoid cyst.    SELLA: No abnormality accounting for technique.    OSSEOUS STRUCTURES/SOFT TISSUES: Normal marrow signal.    ORBITS: No visible intraorbital abnormality.     SINUSES/MASTOIDS: Mild patchy left ethmoid sinus mucosal thickening. Tiny mucous retention cyst right maxillary sinus. No middle ear or mastoid effusion.    HEAD MRA:   ANTERIOR CIRCULATION: No proximal branch vessel occlusion or flow-limiting stenosis. No aneurysm or high-flow vascular malformation.    POSTERIOR CIRCULATION: No visible flow related enhancement to correspond with the left PICA artery.  Otherwise, no proximal branch vessel occlusion or flow-limiting stenosis. No aneurysm or high-flow vascular malformation. Tiny patent bilateral posterior   communicating arteries.    NECK MRA:   RIGHT CAROTID: Patent without flow-limiting stenosis by NASCET criteria at the carotid bifurcation. No dissection.    LEFT CAROTID: Patent without flow-limiting stenosis by NASCET criteria at the carotid bifurcation. No dissection.    VERTEBRAL ARTERIES: Codominant vertebral arteries. No flow-limiting stenosis or dissection.    AORTIC ARCH: There is common origin of the brachiocephalic and left common carotid arteries. Visualized great vessels are widely patent.      Impression    IMPRESSION:  HEAD MRI:  1.  No acute ischemia, mass/mass effect, or abnormal intracranial enhancement.   2.  Chronic left inferior cerebellar infarct.    HEAD MRA:  1.  No proximal cerebral artery branch vessel occlusion, aneurysm, or vascular malformation.   2.  No flow related enhancement corresponding with the left PICA. While this can be seen due to diminutive vessel caliber/hypoplasia, the presence of chronic ipsilateral PICA territory infarct may indicate a component of underlying chronic flow   compromise.    NECK MRA:  1.  No flow-limiting stenosis or findings of dissection.     MRA Brain ([place]) wo Contrast    Narrative    EXAM: MR BRAIN W/O and W CONTRAST, MRA NECK (CAROTIDS) W/O and W CONTRAST, MRA BRAIN ([place]) W/O CONTRAST  LOCATION: [facility]  DATE/TIME: 2/1/2023 7:15 PM    INDICATION: Neurologic problem. Dysphagia.  COMPARISON: CTA head 01/15/2023.  CONTRAST: gadobutrol (GADAVIST) injection 10 mL  TECHNIQUE:   1) Routine multiplanar multisequence head MRI without and with intravenous contrast.  2) 3D time-of-flight head MRA without intravenous contrast.  3) Neck MRA without and with IV contrast. Stenosis measurements made according to NASCET criteria unless otherwise  specified.    FINDINGS:  HEAD MRI:  INTRACRANIAL CONTENTS: Chronic lacunar infarct in left cerebellum. No acute ischemia or edema. No mass, acute hemorrhage, or extra-axial fluid collections. Normal brain parenchymal signal. Normal ventricles and sulci. Normal position of the cerebellar   tonsils. No pathologic contrast enhancement. CSF signal prominence in the left middle cranial fossa measuring up to approximately 1.8 x 3.6 cm transverse compatible with incidental arachnoid cyst.    SELLA: No abnormality accounting for technique.    OSSEOUS STRUCTURES/SOFT TISSUES: Normal marrow signal.    ORBITS: No visible intraorbital abnormality.     SINUSES/MASTOIDS: Mild patchy left ethmoid sinus mucosal thickening. Tiny mucous retention cyst right maxillary sinus. No middle ear or mastoid effusion.    HEAD MRA:   ANTERIOR CIRCULATION: No proximal branch vessel occlusion or flow-limiting stenosis. No aneurysm or high-flow vascular malformation.    POSTERIOR CIRCULATION: No visible flow related enhancement to correspond with the left PICA artery. Otherwise, no proximal branch vessel occlusion or flow-limiting stenosis. No aneurysm or high-flow vascular malformation. Tiny patent bilateral posterior   communicating arteries.    NECK MRA:   RIGHT CAROTID: Patent without flow-limiting stenosis by NASCET criteria at the carotid bifurcation. No dissection.    LEFT CAROTID: Patent without flow-limiting stenosis by NASCET criteria at the carotid bifurcation. No dissection.    VERTEBRAL ARTERIES: Codominant vertebral arteries. No flow-limiting stenosis or dissection.    AORTIC ARCH: There is common origin of the brachiocephalic and left common carotid arteries. Visualized great vessels are widely patent.      Impression    IMPRESSION:  HEAD MRI:  1.  No acute ischemia, mass/mass effect, or abnormal intracranial enhancement.   2.  Chronic left inferior cerebellar infarct.    HEAD MRA:  1.  No proximal cerebral artery branch vessel  occlusion, aneurysm, or vascular malformation.   2.  No flow related enhancement corresponding with the left PICA. While this can be seen due to diminutive vessel caliber/hypoplasia, the presence of chronic ipsilateral PICA territory infarct may indicate a component of underlying chronic flow   compromise.    NECK MRA:  1.  No flow-limiting stenosis or findings of dissection.     CTA Head Neck with Contrast    Narrative    CT ANGIOGRAM OF THE HEAD WITH CONTRAST  2/2/2023 11:05 AM     HISTORY: Posterior circulation stroke. Palpitations, syncope. No  flow-related enhancement seen in the left posterior and inferior  cerebellar artery on the previous MRA.    TECHNIQUE:  CT angiography with an injection of 75 mL [id number] IV  with scans through the head. Images were transferred to a separate 3-D  workstation where multiplanar reformations and 3-D images were  created. Radiation dose for this scan was reduced using automated  exposure control, adjustment of the mA and/or kV according to patient  size, or iterative reconstruction technique.      COMPARISON: MRI brain and MR angiogram of the [place]  2/1/2023. MR angiogram of the neck 2/1/2023.    FINDINGS:  CTA HEAD: The distal cervical, petrous, cavernous, and supraclinoid  segments of the internal carotid arteries are widely patent. The major  proximal branches of the anterior cerebral and middle cerebral  arteries are patent. The bilateral vertebral arteries, the basilar  artery, and the proximal aspects of the posterior cerebral arteries  appear patent. No high-grade proximal arterial stenosis or large  vessel occlusion identified.    As seen on the prior MR angiogram of the [place], flow within  a conventional left posterior inferior cerebellar artery/PICA  (typically arising from the ipsilateral left vertebral artery) is not  visualized. I believe this is due to variant anatomy, with a probable  small left PICA branch arising from the lower aspect  of the basilar  artery, and a portion of the paramedian left inferior cerebellar  hemisphere probably supplied by the contralateral right PICA. The  possibility of chronic occlusion of a conventional left PICA is  difficult to entirely exclude.    No intracranial aneurysm or high-flow vascular malformation  identified.    CTA NECK: Common origin of the brachycephalic and left common carotid  arteries, normal variant. The bilateral common carotid arteries,  carotid bifurcations, and cervical internal carotid arteries are  patent. The bilateral cervical vertebral arteries are patent. No  evidence for significant stenosis, occlusion, or dissection.    OTHER: None.      Impression    IMPRESSION:  1. No high-grade stenosis or large vessel occlusion involving the  major intracranial arteries.  2. Probable variant arterial supply of the left PICA territory, as  described.  3. Patent cervical carotid and vertebral arteries without stenosis or  dissection.  4. No intracranial aneurysm or high-flow vascular malformation.   Echocardiogram Complete     Value    LVEF  65-70%    [facility]    [id number]  [id number]  [id number]  [id number]^ALEXUS^RAEGAN^BUTCH     [facility]  Echocardiography Laboratory  [address] [place] [address]     Name: LAYA BARBA  MRN: [id number]  : 1990  Study Date: 2023 10:59 AM  Age: 32 yrs  Gender: Female  Patient Location: [facility]  Reason For Study: Syncope  Ordering Physician: RAEGAN VAUGHAN  Performed By: Herlinda Magana     BSA: 1.6 m2  Height: 60 in  Weight: 130 lb  HR: 83  BP: 114/71 mmHg  ______________________________________________________________________________  Procedure  Complete Portable Echo Adult.  ______________________________________________________________________________  Interpretation Summary     Normal echocardiogram.     There is no comparison study  available.  ______________________________________________________________________________  Left Ventricle  The left ventricle is normal in size. There is normal left ventricular wall  thickness. Left ventricular systolic function is normal. The visual ejection  fraction is 65-70%. Left ventricular diastolic function is normal. No regional  wall motion abnormalities noted.     Right Ventricle  The right ventricle is normal in size and function.     Atria  Normal left atrial size. Right atrial size is normal. There is no color  Doppler evidence of an atrial shunt.     Mitral Valve  The mitral valve leaflets appear normal. There is no evidence of stenosis,  fluttering, or prolapse. There is no mitral regurgitation noted. There is no  mitral valve stenosis.     Tricuspid Valve  Normal tricuspid valve. This degree of valvular regurgitation is within normal  limits. Right ventricular systolic pressure could not be approximated due to  inadequate tricuspid regurgitation.     Aortic Valve  Normal tricuspid aortic valve. No aortic regurgitation is present. No aortic  stenosis is present.     Pulmonic Valve  The pulmonic valve is not well visualized. This degree of valvular  regurgitation is within normal limits. Normal pulmonic valve velocity.     Vessels  The aortic root is normal size. Normal size ascending aorta. The inferior vena  cava was normal in size with preserved respiratory variability.     Pericardium  There is no pericardial effusion.     Rhythm  Sinus rhythm was noted.  ______________________________________________________________________________  MMode/2D Measurements & Calculations     IVSd: 0.69 cm  LVIDd: 3.9 cm  LVIDs: 2.5 cm  LVPWd: 0.56 cm  FS: 36.4 %  LV mass(C)d: 66.7 grams  LV mass(C)dI: 42.9 grams/m2  Ao root diam: 3.0 cm  LA dimension: 2.6 cm  asc Aorta Diam: 2.4 cm  LA/Ao: 0.86  LA Volume (BP): 33.6 ml  LA Volume Index (BP): 21.7 ml/m2  RWT: 0.29     Doppler Measurements & Calculations  MV E max  anuj: 91.2 cm/sec  MV A max anuj: 63.5 cm/sec  MV E/A: 1.4  MV dec slope: 551.0 cm/sec2  MV dec time: 0.17 sec  PA acc time: 0.15 sec  E/E' av.4  Lateral E/e': 3.9  Medial E/e': 7.0     ______________________________________________________________________________  Report approved by: Dr Jayant Melendez 2023 11:31 AM

## 2023-02-02 NOTE — PROGRESS NOTES
02/02/23 5992   Appointment Info   Signing Clinician's Name / Credentials (SLP) Herlinda Henry MS CCC SLP   General Information   Onset of Illness/Injury or Date of Surgery 02/01/23   Referring Physician Dr. Pitt   Patient/Family Therapy Goal Statement (SLP) Patient reports some dificuly swallowing liquids and solids and reports it gets stuck and has to wait for it to clear. Sounds like esophageal dysfunction.   Pertinent History of Current Problem Per MD note: Adelita Bosch is a 32 year old female admitted on 2/1/2023. She presents with near syncope, dysphagia   General Observations later patient reported that her PCP put her PPI but she felt if didn;t help.   Type of Evaluation   Type of Evaluation Swallow Evaluation   Oral Motor   Oral Musculature generally intact   Structural Abnormalities none present   Mucosal Quality good   Dentition (Oral Motor)   Dentition (Oral Motor) natural dentition;adequate dentition   Facial Symmetry (Oral Motor)   Facial Symmetry (Oral Motor) WNL   Lip Function (Oral Motor)   Lip Range of Motion (Oral Motor) WNL   Tongue Function (Oral Motor)   Tongue Coordination/Speed (Oral Motor) WNL   Tongue ROM (Oral Motor) WNL   Jaw Function (Oral Motor)   Jaw Function (Oral Motor) range of motion impairment  (reduced opening.)   Jaw Range of Motion Impairment bilateral;minimal impairment   Cough/Swallow/Gag Reflex (Oral Motor)   Soft Palate/Velum (Oral Motor) WNL   Vocal Quality/Secretion Management (Oral Motor)   Vocal Quality (Oral Motor) WNL   General Swallowing Observations   Past History of Dysphagia No documented history.   Respiratory Support (General Swallowing Observations) none   Current Diet/Method of Nutritional Intake (General Swallowing Observations, NIS) regular diet;thin liquids (level 0)   Swallowing Evaluation Clinical swallow evaluation   Clinical Swallow Evaluation   Feeding Assistance no assistance needed   Clinical Swallow Evaluation Textures Trialed thin  liquids;pureed;solid foods   Clinical Swallow Eval: Thin Liquid Texture Trial   Mode of Presentation, Thin Liquids cup;self-fed   Volume of Liquid or Food Presented 3 oz of juice   Oral Phase of Swallow WFL   Pharyngeal Phase of Swallow impaired;reduction in laryngeal movement;throat clearing   Diagnostic Statement Reduced laryngeal elevation with throat clear x1.   Clinical Swallow Evaluation: Puree Solid Texture Trial   Mode of Presentation, Puree spoon;self-fed   Volume of Puree Presented 2 bites of oatmeal   Oral Phase, Puree WFL   Pharyngeal Phase, Puree intact   Clinical Swallow Evaluation: Solid Food Texture Trial   Mode of Presentation spoon;self-fed   Volume Presented eggs, banana, [place] toast   Oral Phase residue in oral cavity;delayed AP movement   Pharyngeal Phase impaired;reduction in laryngeal movement;repeated swallows;throat clearing   Diagnostic Statement Minimal oral residue after the swallow with throat clearing.   Esophageal Phase of Swallow   Patient reports or presents with symptoms of esophageal dysphagia Yes   Esophageal comments Throat clearing across all textures.   Swallowing Recommendations   Diet Consistency Recommendations regular diet;thin liquids (level 0)   Supervision Level for Intake distant supervision needed   Mode of Delivery Recommendations bolus size, small;food moistened;slow rate of intake   Postural Recommendations none   Swallowing Maneuver Recommendations alternate food and liquid intake;extra swallow   Monitoring/Assistance Required (Eating/Swallowing) check mouth frequently for oral residue/pocketing;stop eating activities when fatigue is present;monitor for cough or change in vocal quality with intake   Recommended Feeding/Eating Techniques (Swallow Eval) maintain upright posture during/after eating for 30 minutes;maintain upright sitting position for eating   Medication Administration Recommendations, Swallowing (SLP) Whole as tolerated.   Instrumental Assessment  Recommendations VFSS (videofluoroscopic swallowing study)   Comment, Swallowing Recommendations To fully assess pharyngeal function.   General Therapy Interventions   Planned Therapy Interventions Dysphagia Treatment   Dysphagia treatment Instruction of safe swallow strategies   Clinical Impression   Criteria for Skilled Therapeutic Interventions Met (SLP Eval) Yes, treatment indicated   SLP Diagnosis Mild dysphagia   Risks & Benefits of therapy have been explained evaluation/treatment results reviewed;care plan/treatment goals reviewed;risks/benefits reviewed;current/potential barriers reviewed;participants voiced agreement with care plan;participants included;patient   Clinical Impression Comments Patient presents with mild oral/pharyngeal dysphagia at bedside. Oral motor function was grossly intact. She demonstrate reduced laryngeal elevation with thin liquids and intermittent throat clearing after the swallow. Mastication was sufficient for a solid with minimal to no oral residue after the swallow. Continued to have throat clearing after the swallow.  She reported first that she had no history of reflux and then later stated that her PCP put her on a PPI but it did not improve her symptoms. Deficits appear to be more closely related to an esophageal dysfunction but given a chronic lacunar left  cerebellum stroke will complete a video swallow study first and consider an esophagram if its normal.     Recommend: 1. May continue on a regular diet and thin liquids. 2. Up in a chair for all meals, small bites/sips, extra swallow and or liquid rinse. 3. SLP will complete a video swallow study 2/3/23.     SLP Total Evaluation Time   Eval: oral/pharyngeal swallow function, clinical swallow Minutes (31712) 17   SLP Goals   Therapy Frequency (SLP Eval) 4 times/wk   SLP Predicted Duration/Target Date for Goal Attainment 02/09/23   SLP Goals Swallow   SLP: Safely tolerate diet without signs/symptoms of aspiration Regular  diet;Thin liquids;With use of swallow precautions   Interventions   Interventions Quick Adds Swallowing Dysfunction   Swallowing Dysfunction &/or Oral Function for Feeding   Treatment of Swallowing Dysfunction &/or Oral Function for Feeding Minutes (94591) 10   Symptoms Noted During/After Treatment None   Treatment Detail/Skilled Intervention Patient was seen for swallow treatment to provide education on the Sx of aspiration. Sx of esophageal dysfunction and swallow strategies to reduce the risk for aspiration. Rationale for completing a video swallow study and esophagram. Patient in agreement.   SLP Discharge Planning   SLP Plan video   SLP Discharge Recommendation home with outpatient speech therapy   SLP Rationale for DC Rec Anticipate swallow goals to be met as an IP and pending video results.   SLP Brief overview of current status  Mild oral and pharyngeal dysphagia with esophageal component.   Total Session Time   Total Session Time (sum of timed and untimed services) 27                                                                                [facility]      OUTPATIENT SPEECH LANGUAGE PATHOLOGY EVALUATION  PLAN OF TREATMENT FOR OUTPATIENT REHABILITATION  (COMPLETE FOR INITIAL CLAIMS ONLY)  Patient's Last Name, First Name, M.I.  YOB: 1990  Adelita Emerson                           Provider's Name  [facility] Medical Record No.  [id number]                               Onset Date:  02/01/23  Start of Care Date:      Type:     ___PT   ___OT   _X_SLP Medical Diagnosis:            SLP Diagnosis:  Mild dysphagia  Visits from SOC:  1   ________________________________________________________________  Plan of Treatment/Functional Goals    Planned Interventions:   Dysphagia Treatment       Instruction of safe swallow strategies        Goals: See Speech Language Pathology Goals on Care Plan in [facility] electronic health record.    Therapy  Frequency:4 times/wk   Predicted Duration of Therapy Intervention: 02/09/23  ________________________________________________________________________________    I CERTIFY THE NEED FOR THESE SERVICES FURNISHED UNDER        THIS PLAN OF TREATMENT AND WHILE UNDER MY CARE     (Physician co-signature of this document indicates review and certification of the therapy plan).                       Referring Physician: Dr. Pitt           Initial Assessment        See Speech Language Pathology documentation in Epic electronic health record, evaluation dated

## 2023-02-02 NOTE — PROGRESS NOTES
RECEIVING UNIT ED HANDOFF REVIEW    ED Nurse Handoff Report was reviewed by: Yael Miranda RN on February 1, 2023 at 11:07 PM

## 2023-02-02 NOTE — H&P
[facility]    History and Physical - Hospitalist Service       Date of Admission:  2/1/2023    Assessment & Plan      Adelita Bosch is a 32 year old female admitted on 2/1/2023. She presents with near syncope, dysphagia    Concern for TIA  Hx CVA  *was in ED 1/15/23 with tremors. CT at that time showed a chronic lacunar infarct in cerebellum.  Saw neuro for follow up 1/19. Also with mild L sided weakness, intermittent chest pressure, palpitation. Rec: EEG, MRI, echo, event monitor, sleep study, ASA 81.  *presents with headache, lightheadedness, L arm numbness x 2 weeks. Then today with difficulty swallowing, not painful. Also ear pain , balance issues, chest pain. In ED AFVSS. MRI with chronic L cerebellar infarhct, no flow in L PICA (see report).   - telemetry  - q4 hour neuro checks  - ASA 81 mg daily   - echo  - check TSH  - stroke neuro consult  - PT/ SLP    Elevated troponin  Near syncope  Has noted episodes of dizziness/ near syncope. Also some tachycardia. Presented with some c/o chest and arm pain. Initial troponin 35, repeat <6. BNP normal. EKG NSR. Low suspicion for ACS  - telemetry   - repeat troponin in am  - echo as above    RA  Sjogren's syndrome  [needs rec- prednisone- 2.5 mg daily, plaquenil 300 mg daily, certolizumab 200 mg q2 weeks]  States not taking plaquenil or certolizumab right now given above. Still taking prednisone  - continue low dose prednisone 2.5 mg daily    Anxiety/ possible panic  Was in ED with apparent panic attack 2/2022. Some anxiety in ED.   - monitor for now  - defer to primary    Hx TB  S/p treatment.        Diet:   regular diet  DVT Prophylaxis: Ambulate every shift  Rodas Catheter: Not present  Lines: None     Cardiac Monitoring: None  Code Status:   full    Clinically Significant Risk Factors Present on Admission                       # Overweight: Estimated body mass index is 25.39 kg/m  as calculated from the following:    Height as of  1/19/23: 1.524 m (5').    Weight as of 1/19/23: 59 kg (130 lb).           Disposition Plan           Caio Pitt MD  Hospitalist Service  [facility]  Securely message with [employer] (more info)  Text page via [facility] Paging/Directory     ______________________________________________________________________    Chief Complaint   Multiple sx    History is obtained from the patient, electronic health record and emergency department physician    History of Present Illness   Adelita Bosch is a 32 year old female who presents with multiple concerns.  In late November 2022 she started develop left hand tremors as well as tingling/numbness in her hand.  This was fairly persistent and eventually involved her left side down to her knee (with respect to her numbness).  On January 15 she had a fever and headache as well as whole body tremors (not seizure) and she went to the emergency department.  Work-up was generally unremarkable although they did a head CT and they saw an old lacunar infarct in her cerebellum.  She had followed up with neurology and general work-up as an outpatient was arranged including MRI, EEG, event monitor, and sleep study.  Today she started to develop shortness of breath as well as like a tightness in her chest and felt like she was going to faint.  She is also had dysphagia over the last several days if not longer where she states it is difficult to swallow.  She denies any pain with swallowing.  She has no right-sided symptoms.  She also notes a headache which does go across her head and she occasionally gets some left ear pain.  Her fevers have resolved from 15 January emergency department.  She had no loss of consciousness.  No nausea.  She is walking okay.  She has noted for several months that she maybe has some mild left-sided weakness.      Past Medical History    Past Medical History:   Diagnosis Date     Rheumatoid arthritis (H)    CVA    Past Surgical  History   No past surgical history on file.    Prior to Admission Medications   Prior to Admission Medications   Prescriptions Last Dose Informant Patient Reported? Taking?   amoxicillin-clavulanate (AUGMENTIN) 875-125 MG tablet   No No   Sig: Take 1 tablet by mouth 2 times daily   hydrOXYzine (ATARAX) 25 MG tablet   No No   Sig: Take 1-2 tablets (25-50 mg) by mouth 3 times daily as needed for anxiety   methylPREDNISolone (MEDROL DOSEPAK) 4 MG tablet therapy pack   No No   Sig: Follow Package Directions      Facility-Administered Medications: None        Review of Systems    The 10 point Review of Systems is negative other than noted in the HPI or here.     Social History   I have reviewed this patient's social history and updated it with pertinent information if needed.  Social History     Tobacco Use     Smoking status: Never   Substance Use Topics     Alcohol use: Yes     Comment: rare     Drug use: Never        Physical Exam   Vital Signs: Temp: 98  F (36.7  C) Temp src: Oral BP: 117/70 Pulse: 94   Resp: 20 SpO2: 99 % O2 Device: None (Room air)    Weight: 0 lbs 0 oz    General Appearance: Alert, pleasant, no distress  Respiratory: CTA B  Cardiovascular: RRR no murmur, no edema  GI: soft, NT/ND  Skin: no lesions  Other: CN II-XII intact, possible sl L  strength weakness     Medical Decision Making       85 MINUTES SPENT BY ME on the date of service doing chart review, history, exam, documentation & further activities per the note.      Data     I have personally reviewed the following data over the past 24 hrs:    6.1  \   13.7   / 218     139 104 8.1 /  85   3.6 26 0.59 \       ALT: 11 AST: 17 AP: 56 TBILI: 0.8   ALB: 4.3 TOT PROTEIN: 7.3 LIPASE: N/A       Trop: <6 BNP: 148       Imaging results reviewed over the past 24 hrs:   Recent Results (from the past 24 hour(s))   MR Brain w/o & w Contrast    Narrative    EXAM: MR BRAIN W/O and W CONTRAST, MRA NECK (CAROTIDS) W/O and W CONTRAST, MRA BRAIN ([place]) W/O CONTRAST  LOCATION: [facility]  DATE/TIME: 2/1/2023 7:15 PM    INDICATION: Neurologic problem. Dysphagia.  COMPARISON: CTA head 01/15/2023.  CONTRAST: gadobutrol (GADAVIST) injection 10 mL  TECHNIQUE:   1) Routine multiplanar multisequence head MRI without and with intravenous contrast.  2) 3D time-of-flight head MRA without intravenous contrast.  3) Neck MRA without and with IV contrast. Stenosis measurements made according to NASCET criteria unless otherwise specified.    FINDINGS:  HEAD MRI:  INTRACRANIAL CONTENTS: Chronic lacunar infarct in left cerebellum. No acute ischemia or edema. No mass, acute hemorrhage, or extra-axial fluid collections. Normal brain parenchymal signal. Normal ventricles and sulci. Normal position of the cerebellar   tonsils. No pathologic contrast enhancement. CSF signal prominence in the left middle cranial fossa measuring up to approximately 1.8 x 3.6 cm transverse compatible with incidental arachnoid cyst.    SELLA: No abnormality accounting for technique.    OSSEOUS STRUCTURES/SOFT TISSUES: Normal marrow signal.    ORBITS: No visible intraorbital abnormality.     SINUSES/MASTOIDS: Mild patchy left ethmoid sinus mucosal thickening. Tiny mucous retention cyst right maxillary sinus. No middle ear or mastoid effusion.    HEAD MRA:   ANTERIOR CIRCULATION: No proximal branch vessel occlusion or flow-limiting stenosis. No aneurysm or high-flow vascular malformation.    POSTERIOR CIRCULATION: No visible flow related enhancement to correspond with the left PICA artery. Otherwise, no proximal branch vessel occlusion or flow-limiting stenosis. No aneurysm or high-flow vascular malformation. Tiny patent bilateral posterior   communicating arteries.    NECK MRA:   RIGHT CAROTID: Patent without flow-limiting stenosis by NASCET criteria at the carotid bifurcation. No dissection.    LEFT CAROTID: Patent without flow-limiting stenosis by NASCET criteria at the  carotid bifurcation. No dissection.    VERTEBRAL ARTERIES: Codominant vertebral arteries. No flow-limiting stenosis or dissection.    AORTIC ARCH: There is common origin of the brachiocephalic and left common carotid arteries. Visualized great vessels are widely patent.      Impression    IMPRESSION:  HEAD MRI:  1.  No acute ischemia, mass/mass effect, or abnormal intracranial enhancement.   2.  Chronic left inferior cerebellar infarct.    HEAD MRA:  1.  No proximal cerebral artery branch vessel occlusion, aneurysm, or vascular malformation.   2.  No flow related enhancement corresponding with the left PICA. While this can be seen due to diminutive vessel caliber/hypoplasia, the presence of chronic ipsilateral PICA territory infarct may indicate a component of underlying chronic flow   compromise.    NECK MRA:  1.  No flow-limiting stenosis or findings of dissection.     MRA Neck (Carotids) wo & w Contrast    Narrative    EXAM: MR BRAIN W/O and W CONTRAST, MRA NECK (CAROTIDS) W/O and W CONTRAST, MRA BRAIN ([place]) W/O CONTRAST  LOCATION: [facility]  DATE/TIME: 2/1/2023 7:15 PM    INDICATION: Neurologic problem. Dysphagia.  COMPARISON: CTA head 01/15/2023.  CONTRAST: gadobutrol (GADAVIST) injection 10 mL  TECHNIQUE:   1) Routine multiplanar multisequence head MRI without and with intravenous contrast.  2) 3D time-of-flight head MRA without intravenous contrast.  3) Neck MRA without and with IV contrast. Stenosis measurements made according to NASCET criteria unless otherwise specified.    FINDINGS:  HEAD MRI:  INTRACRANIAL CONTENTS: Chronic lacunar infarct in left cerebellum. No acute ischemia or edema. No mass, acute hemorrhage, or extra-axial fluid collections. Normal brain parenchymal signal. Normal ventricles and sulci. Normal position of the cerebellar   tonsils. No pathologic contrast enhancement. CSF signal prominence in the left middle cranial fossa measuring up to  approximately 1.8 x 3.6 cm transverse compatible with incidental arachnoid cyst.    SELLA: No abnormality accounting for technique.    OSSEOUS STRUCTURES/SOFT TISSUES: Normal marrow signal.    ORBITS: No visible intraorbital abnormality.     SINUSES/MASTOIDS: Mild patchy left ethmoid sinus mucosal thickening. Tiny mucous retention cyst right maxillary sinus. No middle ear or mastoid effusion.    HEAD MRA:   ANTERIOR CIRCULATION: No proximal branch vessel occlusion or flow-limiting stenosis. No aneurysm or high-flow vascular malformation.    POSTERIOR CIRCULATION: No visible flow related enhancement to correspond with the left PICA artery. Otherwise, no proximal branch vessel occlusion or flow-limiting stenosis. No aneurysm or high-flow vascular malformation. Tiny patent bilateral posterior   communicating arteries.    NECK MRA:   RIGHT CAROTID: Patent without flow-limiting stenosis by NASCET criteria at the carotid bifurcation. No dissection.    LEFT CAROTID: Patent without flow-limiting stenosis by NASCET criteria at the carotid bifurcation. No dissection.    VERTEBRAL ARTERIES: Codominant vertebral arteries. No flow-limiting stenosis or dissection.    AORTIC ARCH: There is common origin of the brachiocephalic and left common carotid arteries. Visualized great vessels are widely patent.      Impression    IMPRESSION:  HEAD MRI:  1.  No acute ischemia, mass/mass effect, or abnormal intracranial enhancement.   2.  Chronic left inferior cerebellar infarct.    HEAD MRA:  1.  No proximal cerebral artery branch vessel occlusion, aneurysm, or vascular malformation.   2.  No flow related enhancement corresponding with the left PICA. While this can be seen due to diminutive vessel caliber/hypoplasia, the presence of chronic ipsilateral PICA territory infarct may indicate a component of underlying chronic flow   compromise.    NECK MRA:  1.  No flow-limiting stenosis or findings of dissection.     MRA Brain ([place]) wo Contrast    Narrative    EXAM: MR BRAIN W/O and W CONTRAST, MRA NECK (CAROTIDS) W/O and W CONTRAST, MRA BRAIN ([place] W/O CONTRAST  LOCATION: [facility]  DATE/TIME: 2/1/2023 7:15 PM    INDICATION: Neurologic problem. Dysphagia.  COMPARISON: CTA head 01/15/2023.  CONTRAST: gadobutrol (GADAVIST) injection 10 mL  TECHNIQUE:   1) Routine multiplanar multisequence head MRI without and with intravenous contrast.  2) 3D time-of-flight head MRA without intravenous contrast.  3) Neck MRA without and with IV contrast. Stenosis measurements made according to NASCET criteria unless otherwise specified.    FINDINGS:  HEAD MRI:  INTRACRANIAL CONTENTS: Chronic lacunar infarct in left cerebellum. No acute ischemia or edema. No mass, acute hemorrhage, or extra-axial fluid collections. Normal brain parenchymal signal. Normal ventricles and sulci. Normal position of the cerebellar   tonsils. No pathologic contrast enhancement. CSF signal prominence in the left middle cranial fossa measuring up to approximately 1.8 x 3.6 cm transverse compatible with incidental arachnoid cyst.    SELLA: No abnormality accounting for technique.    OSSEOUS STRUCTURES/SOFT TISSUES: Normal marrow signal.    ORBITS: No visible intraorbital abnormality.     SINUSES/MASTOIDS: Mild patchy left ethmoid sinus mucosal thickening. Tiny mucous retention cyst right maxillary sinus. No middle ear or mastoid effusion.    HEAD MRA:   ANTERIOR CIRCULATION: No proximal branch vessel occlusion or flow-limiting stenosis. No aneurysm or high-flow vascular malformation.    POSTERIOR CIRCULATION: No visible flow related enhancement to correspond with the left PICA artery. Otherwise, no proximal branch vessel occlusion or flow-limiting stenosis. No aneurysm or high-flow vascular malformation. Tiny patent bilateral posterior   communicating arteries.    NECK MRA:   RIGHT CAROTID: Patent without flow-limiting stenosis by NASCET  criteria at the carotid bifurcation. No dissection.    LEFT CAROTID: Patent without flow-limiting stenosis by NASCET criteria at the carotid bifurcation. No dissection.    VERTEBRAL ARTERIES: Codominant vertebral arteries. No flow-limiting stenosis or dissection.    AORTIC ARCH: There is common origin of the brachiocephalic and left common carotid arteries. Visualized great vessels are widely patent.      Impression    IMPRESSION:  HEAD MRI:  1.  No acute ischemia, mass/mass effect, or abnormal intracranial enhancement.   2.  Chronic left inferior cerebellar infarct.    HEAD MRA:  1.  No proximal cerebral artery branch vessel occlusion, aneurysm, or vascular malformation.   2.  No flow related enhancement corresponding with the left PICA. While this can be seen due to diminutive vessel caliber/hypoplasia, the presence of chronic ipsilateral PICA territory infarct may indicate a component of underlying chronic flow   compromise.    NECK MRA:  1.  No flow-limiting stenosis or findings of dissection.

## 2023-02-02 NOTE — PLAN OF CARE
Goal Outcome Evaluation:  PT-Reviewed chart and walked by patients room and patient was up independently. Discussed with nurse at that time and she reported patients symptoms had resolved. No PT needs noted. Will complete order.

## 2023-02-02 NOTE — ED NOTES
[facility]  ED Nurse Handoff Report    ED Chief complaint: Palpitations and Syncope      ED Diagnosis:   Final diagnoses:   TIA (transient ischemic attack)       Code Status: Full Code    Allergies: No Known Allergies    Patient Story: patient coming in with multiple sympms. Patient states that two weeks ago she was diagnosed with a CVA. Told to return if she develops new symptoms. Around 2 pm patient states that she started to get palpitations, and had a near syncopal episode. Patient endorses headache. BEFAST negative in triage. Patient has equal  strength.   Focused Assessment:   Labs Ordered and Resulted from Time of ED Arrival to Time of ED Departure   TROPONIN T, HIGH SENSITIVITY - Abnormal       Result Value    Troponin T, High Sensitivity 35 (*)    COMPREHENSIVE METABOLIC PANEL - Normal    Sodium 139      Potassium 3.6      Chloride 104      Carbon Dioxide (CO2) 26      Anion Gap 9      Urea Nitrogen 8.1      Creatinine 0.59      Calcium 9.1      Glucose 85      Alkaline Phosphatase 56      AST 17      ALT 11      Protein Total 7.3      Albumin 4.3      Bilirubin Total 0.8      GFR Estimate >90     MAGNESIUM - Normal    Magnesium 2.0     NT PROBNP INPATIENT - Normal    N terminal Pro BNP Inpatient 148     HCG QUALITATIVE PREGNANCY - Normal    hCG Serum Qualitative Negative     TROPONIN T, HIGH SENSITIVITY - Normal    Troponin T, High Sensitivity <6     CBC WITH PLATELETS AND DIFFERENTIAL    WBC Count 6.1      RBC Count 4.16      Hemoglobin 13.7      Hematocrit 39.6      MCV 95      MCH 32.9      MCHC 34.6      RDW 12.2      Platelet Count 218      % Neutrophils 47      % Lymphocytes 43      % Monocytes 7      % Eosinophils 2      % Basophils 1      % Immature Granulocytes 0      NRBCs per 100 WBC 0      Absolute Neutrophils 2.9      Absolute Lymphocytes 2.6      Absolute Monocytes 0.4      Absolute Eosinophils 0.1      Absolute Basophils 0.1      Absolute Immature Granulocytes 0.0       Absolute NRBCs 0.0       MRA Brain ([place]) wo Contrast   Final Result   IMPRESSION:   HEAD MRI:   1.  No acute ischemia, mass/mass effect, or abnormal intracranial enhancement.    2.  Chronic left inferior cerebellar infarct.      HEAD MRA:   1.  No proximal cerebral artery branch vessel occlusion, aneurysm, or vascular malformation.    2.  No flow related enhancement corresponding with the left PICA. While this can be seen due to diminutive vessel caliber/hypoplasia, the presence of chronic ipsilateral PICA territory infarct may indicate a component of underlying chronic flow    compromise.      NECK MRA:   1.  No flow-limiting stenosis or findings of dissection.         MRA Neck (Carotids) wo & w Contrast   Final Result   IMPRESSION:   HEAD MRI:   1.  No acute ischemia, mass/mass effect, or abnormal intracranial enhancement.    2.  Chronic left inferior cerebellar infarct.      HEAD MRA:   1.  No proximal cerebral artery branch vessel occlusion, aneurysm, or vascular malformation.    2.  No flow related enhancement corresponding with the left PICA. While this can be seen due to diminutive vessel caliber/hypoplasia, the presence of chronic ipsilateral PICA territory infarct may indicate a component of underlying chronic flow    compromise.      NECK MRA:   1.  No flow-limiting stenosis or findings of dissection.         MR Brain w/o & w Contrast   Final Result   IMPRESSION:   HEAD MRI:   1.  No acute ischemia, mass/mass effect, or abnormal intracranial enhancement.    2.  Chronic left inferior cerebellar infarct.      HEAD MRA:   1.  No proximal cerebral artery branch vessel occlusion, aneurysm, or vascular malformation.    2.  No flow related enhancement corresponding with the left PICA. While this can be seen due to diminutive vessel caliber/hypoplasia, the presence of chronic ipsilateral PICA territory infarct may indicate a component of underlying chronic flow    compromise.      NECK MRA:   1.  No  flow-limiting stenosis or findings of dissection.               Treatments and/or interventions provided: Saline locked IV   Patient's response to treatments and/or interventions: Tolerated     To be done/followed up on inpatient unit:  Neuro assessment     Does this patient have any cognitive concerns?: None    Activity level - Baseline/Home:  Independent  Activity Level - Current:   Independent    Patient's Preferred language: English   Needed?: No    Isolation: None  Infection: Not Applicable  Patient tested for COVID 19 prior to admission: YES  Bariatric?: No    Vital Signs:   Vitals:    02/01/23 1545 02/01/23 2000   BP: 108/65 117/70   Pulse: 97 94   Resp: 20 20   SpO2: 100% 99%       Cardiac Rhythm:     Was the PSS-3 completed:   Yes  What interventions are required if any?               Family Comments:  in room   OBS brochure/video discussed/provided to patient/family: Yes     For the majority of the shift this patient's behavior was Green.   Behavioral interventions performed were None.    ED NURSE PHONE NUMBER: [phone]

## 2023-02-03 ENCOUNTER — HOSPITAL ENCOUNTER (OUTPATIENT)
Dept: CARDIOLOGY | Facility: CLINIC | Age: 33
Discharge: HOME | End: 2023-02-03
Payer: MEDICAID

## 2023-02-03 ENCOUNTER — PATIENT OUTREACH (OUTPATIENT)
Dept: CARE COORDINATION | Facility: CLINIC | Age: 33
End: 2023-02-03

## 2023-02-03 DIAGNOSIS — Z86.73 CHRONIC CEREBROVASCULAR ACCIDENT: ICD-10-CM

## 2023-02-03 LAB
DRVVT SCREEN RATIO: 0.75
INR PPP: 1.05 (ref 0.85–1.15)
LA PPP-IMP: NEGATIVE
LUPUS INTERPRETATION: NORMAL
LVEF ECHO: NORMAL
PTT RATIO: 1.15
THROMBIN TIME: 16.3 SECONDS (ref 13–19)

## 2023-02-03 PROCEDURE — 93272 ECG/REVIEW INTERPRET ONLY: CPT | Performed by: INTERNAL MEDICINE

## 2023-02-03 PROCEDURE — 93270 REMOTE 30 DAY ECG REV/REPORT: CPT

## 2023-02-03 PROCEDURE — 93306 TTE W/DOPPLER COMPLETE: CPT | Mod: 26 | Performed by: INTERNAL MEDICINE

## 2023-02-03 PROCEDURE — 93306 TTE W/DOPPLER COMPLETE: CPT

## 2023-02-03 NOTE — PROGRESS NOTES
Clinic Care Coordination Contact  [facility]: Post-Discharge Note  SITUATION                                                      Admission:    Admission Date: 02/01/23   Reason for Admission: Chronic left cerebellar CVA with L PICA occlusion, ESUS  Mild oropharyngeal dysphagia  Elevated troponin: Resolved  Near syncope  Palpitations  RA  Sjogren's syndrome  Anxiety  Hx of panic attack in 2/2022  Hx of TB, s/p treatment  Discharge:   Discharge Date: 02/02/23  Discharge Diagnosis: Chronic left cerebellar CVA with L PICA occlusion, ESUS  Mild oropharyngeal dysphagia  Elevated troponin: Resolved  Near syncope  Palpitations  RA  Sjogren's syndrome  Anxiety  Hx of panic attack in 2/2022  Hx of TB, s/p treatment    BACKGROUND                                                      Per hospital discharge summary and inpatient provider notes:    History of Present Illness     Adelita Bosch is an 32 year old female with PMHx of rheumatoid arthritis, Sjogren's syndrome, anxiety and hx of TB s/p treatment who was admitted under observation status on 2/1/2023 for evaluation of near syncope and dysphagia, with clinical picture concerning for TIA vs CVA.            Hospital Course     Adelita Bosch was admitted on 2/1/2023.  The following problems were addressed during her hospitalization:     Chronic left cerebellar CVA with L PICA occlusion, ESUS  * Had been seen in ED on 1/15/23 for evaluation of tremors and paraesthesias of the hand/face. At that time, head CT showed a chronic lacunar infarct in the left cerebellum.   * Was seen by  neurology on 1/19/23 for follow up. Was also noted to have some mild left sided weakness, intermittent chest pressure and palpitations. Was started on ASA 81mg daily. Recommended further evaluation with MRI, echocardiogram, cardiac event monitor, EEG and sleep study.   * Presented back to ED on 2/1 with headache, lightheadedness and left arm numbness x2 wks. On 2/1, she developed  "difficulty swallowing, no pain. Also reported ear pain, balance issues and chest pain.   * In ED, was afebrile and VSS. MRI brain and MRA head/neck were obtained -- notable for previously noted chronic left inferior cerebellar infarct; MRA notable for \"No flow related enhancement corresponding with the left PICA. While this can be seen due to diminutive vessel caliber/hypoplasia, the presence of chronic ipsilateral PICA territory infarct may indicate a component of underlying chronic flow compromise.\"  * Seen by stroke team. Further evaluation with CTA head/neck on 2/1 showed a probable variant arterial supply of the left PICA territory. No evidence of dissection.   -- stroke team recommended to cont ASA 81mg daily for secondary stroke prevention; hypercoag workup ordered per stroke team.   -- OP SLP as below  -- sched to have OP cardiac event monitor placed on 2/3/23  -- follow up with general neurology in clinic in 6-8 wks (# [phone], was seen by Dr. Gutierrez in the past)     Mild oropharyngeal dysphagia  * Not felt to be related to vascular etiology. Unclear if secondary to RA/Sjogern's syndrome.   * On bedside swallow study had signs of mild oropharyngeal dysphagia. Seen by SLP this stay, VFSS ordered, can be done in the outpatient setting. SLP also recommended an esophogram. Okay'd for regular diet w/thin liquids     Elevated troponin: Resolved  Near syncope  Palpitations  * Has noted hx of episodes of dizziness and near syncope. Also with some tachycardia.   * On presentation this stay, reported some chest and arm pain. EKG showed NSR. Trop 35 -->  < 6 --> < 6  on rechecks. ProBNP nl. TSH nl.  * Monitored on telemetry this stay, no arrhythmias noted  * Echocardiogram this stay showed EF 65-70% with no WMAs, no valve disease. Overall normal study.   -- as above, sched to have OP cardiac event monitor placed on 2/3/23     RA  Sjogren's syndrome  * Home meds include prednisone 2.5mg daily for RA (increases to " "5mg daily for RA flare), plaquenil 300mg daily, certolizumab 200mg q2 weeks  * Patient told pharmacist on admission she hadn't been taking certolizumab and hydroxychloroquine in the 2-4 wks prior to admission dt concern those meds might be causing adverse side effects.   -- follow up with primary rheumatologist after discharge     Anxiety  Hx of panic attack in 2/2022  * Was in ED with apparent panic attack 2/2022 and prescribed prn Atarax.   * Noted to have some anxiety in ED this stay.   * Chronic and stable on sertraline 25mg daily.     Hx of TB, s/p treatment  * Noted. No concerns this stay.      ASSESSMENT           Discharge Assessment  How are you doing now that you are home?: \"I'm feeling perfectly fine right now but when I'm eating I feel like something funny on the upper side of head where my eyebrow is, feels kind of funny, my mind gets kind of confused when chewing/eating, when my jaws are moving.\"  Still some \"tremors\" on the L arm and leg.  Had 30 day cardiac event monitor placed today and also had an echo done today as well.  How are your symptoms? (Red Flag symptoms escalate to triage hotline per guidelines): Unchanged  Do you feel your condition is stable enough to be safe at home until your provider visit?: Yes  Does the patient have their discharge instructions? : Yes  Does the patient have questions regarding their discharge instructions? : No  Were you started on any new medications or were there changes to any of your previous medications? : Yes (Pt states she started ASA today as prescribed, and taking her other PTA medications as directed)  Does the patient have all of their medications?: Yes  Do you have questions regarding any of your medications? : No  Do you have all of your needed medical supplies or equipment (DME)?  (i.e. oxygen tank, CPAP, cane, etc.): Yes (cardiac event monitor)  Discharge follow-up appointment scheduled within 14 calendar days? : No  Is patient agreeable to " assistance with scheduling? : No (Pt states she will schedule a hospital follow up appt with her [facility], and keep [facility] new pt appt to establish care with provider closer to home on 3/7/23, as scheduled)         Post-op (Clinicians Only)  Did the patient have surgery or a procedure: No        PLAN                                                      Outpatient Plan:      Follow up with your PCP in 1-2 weeks  Follow up with stroke team in 6-8 weeks.  Follow up with your rheumatologist as scheduled.  Follow up with outpatient speech therapist for further evaluation with a  video swallow study.    Future Appointments   Date Time Provider Department Center   2/13/2023 11:00 AM SH SLP OP VFSS [facility]   2/13/2023 11:00 AM [facility]   3/7/2023  1:00 PM Shari Abad APRN CNP [facility]   4/3/2023 11:00 AM Kacey Sullivan APRN CNP [facility]   5/4/2023 10:30 AM Pedro Lazcano MD [facility]         For any urgent concerns, please contact our 24 hour nurse triage line: [phone] ([phone])         Chelsi Leonard RN

## 2023-02-03 NOTE — PLAN OF CARE
Speech Language Therapy Discharge Summary    Reason for therapy discharge:    Discharged to home with OP SLP    Progress towards therapy goal(s). See goals on Care Plan in [facility] electronic health record for goal details.  Goals not met.  Barriers to achieving goals:   discharge from facility.    Therapy recommendation(s):    OP SLP for dysphagia. Planned VFSS for 2/3/23 but pt discharged home prior. Recommend consideration of VFSS (+Esophagram) as OP given signs/sx of aspiration and/or esophageal dysfunction on clinical swallow evaluation from 2/2/23

## 2023-02-08 LAB
B2 GLYCOPROT1 IGG SERPL IA-ACNC: 0.8 U/ML
B2 GLYCOPROT1 IGM SERPL IA-ACNC: <2.4 U/ML
CARDIOLIPIN IGG SER IA-ACNC: <2 GPL-U/ML
CARDIOLIPIN IGG SER IA-ACNC: NEGATIVE
CARDIOLIPIN IGM SER IA-ACNC: <2 MPL-U/ML
CARDIOLIPIN IGM SER IA-ACNC: NEGATIVE

## 2023-02-13 ENCOUNTER — HOSPITAL ENCOUNTER (OUTPATIENT)
Dept: SPEECH THERAPY | Facility: CLINIC | Age: 33
Discharge: HOME | End: 2023-02-13
Attending: INTERNAL MEDICINE
Payer: MEDICAID

## 2023-02-13 ENCOUNTER — HOSPITAL ENCOUNTER (OUTPATIENT)
Dept: GENERAL RADIOLOGY | Facility: CLINIC | Age: 33
Discharge: HOME | End: 2023-02-13
Attending: INTERNAL MEDICINE
Payer: MEDICAID

## 2023-02-13 DIAGNOSIS — R13.10 DYSPHAGIA: ICD-10-CM

## 2023-02-13 DIAGNOSIS — I63.9 CEREBROVASCULAR ACCIDENT (CVA), UNSPECIFIED MECHANISM (H): ICD-10-CM

## 2023-02-13 PROCEDURE — 92610 EVALUATE SWALLOWING FUNCTION: CPT | Mod: GN | Performed by: SPEECH-LANGUAGE PATHOLOGIST

## 2023-02-13 PROCEDURE — 74230 X-RAY XM SWLNG FUNCJ C+: CPT

## 2023-02-13 PROCEDURE — 92611 MOTION FLUOROSCOPY/SWALLOW: CPT | Mod: GN,XU | Performed by: SPEECH-LANGUAGE PATHOLOGIST

## 2023-02-13 RX ORDER — BARIUM SULFATE 400 MG/ML
SUSPENSION ORAL ONCE
Status: DISCONTINUED | OUTPATIENT
Start: 2023-02-13 | End: 2023-02-13

## 2023-02-13 NOTE — PROGRESS NOTES
SLP Clinical Swallow Evaluation and VFSS Report 02/13/23 1209   General Information   Type Of Visit Initial   Start Of Care Date 02/13/23   Referring Physician Dr. Bertha Dowd   Pertinent History of Current Problem/OT: Additional Occupational Profile Info Pt reports dysphagia symptoms for approx 1 year with increased difficulty over the past few months to weeks.  Pt has a hx of left cerebellar CVI, recent left sided numbness and tingling including her face and tongue, Sjogren's, RA, 7 pound weight loss recently, feeling of choking on food, need to spit out food, increased throat clearing with po including thin liquids, feeling of SOB, globus sensation, and feeling of reflux at times.  Pt states she is scared to swallow at times and is very anxious about eating.   Patient/family Goals To decrease globus sensation and improve ease of swallowing   General Information Comments Pt appeared anxious during the study and took deep breaths at times.  Pt felt she couldn't swallow the pudding trial, but pt was reassured she had been swallowing well during the study.  Pt swallowed trial with min-mod cues.  Numbering on this report is different from image numbers given view 1 was actually trial 4 but not fully saved.  Pt was throat clearing and reporting globus sensation; however, no aspiration or significant pharyngeal residue noted on today's study.   See below for esophageal symptoms.    Pt's soft palate including uvula may be enlarged reducing some base of tongue motion   Fall Risk Screen   Fall screen comments See radiology staff documentation.   Clinical Swallow Evaluation   Oral Musculature generally intact  (min tongue asymmetry, unable to view uvula)   Dentition present and adequate   Additional Documentation Yes   Additional evaluation(s) completed today Yes  (Clinical: interview, oral motor exam, thin water trials)   Rationale for completing additional evaluation VFSS to fully assess pharyngeal phase function and  aspiration risk   Clinical Swallow Eval: Thin Liquid Texture Trial   Mode of Presentation, Thin Liquids cup   Volume of Liquid or Food Presented sips x 2   Oral Phase of Swallow WFL   Pharyngeal Phase of Swallow reduction in laryngeal movement;repeated swallows   Diagnostic Statement no signs of aspiration   VFSS Evaluation   VFSS Additional Documentation Yes   VFSS Eval: Radiology   Radiologist Dr. Hays   Views Taken left lateral;A/P   Physical Location of Procedure [facility]   VFSS Eval: Thin Liquid Texture Trial   Mode of Presentation, Thin Liquid cup;straw   Order of Presentation 1, 2, 3, 4   Preparatory Phase Poor bolus control   Oral Phase, Thin Liquid Premature pharyngeal entry;Residue in oral cavity   Pharyngeal Phase, Thin Liquid Delayed swallow reflex   Rosenbek's Penetration Aspiration Scale: Thin Liquid Trial Results 2 - contrast enters airway, remains above the vocal cords, no residue remains (penetration)   Diagnostic Statement Mild decreased base of tongue function, loss of bolus, delay to the pyriforms at times, tight UES, trace residue at UES and below at times, flash min penetration per recording review x 2, min decreased epiglottic closure, bolus behind uvula x 1   VFSS Eval: Puree Solid Texture Trial   Mode of Presentation, Puree spoon   Order of Presentation 5, 6, 9   Preparatory Phase Poor bolus control   Oral Phase, Puree Effortful AP movement;Residue in oral cavity   Pharyngeal Phase, Puree   (no delay)   Rosenbek's Penetration Aspiration Scale: Puree Food Trial Results 1 - no aspiration, contrast does not enter airway   Diagnostic Statement Mild decreased base of tongue function and residue, tight UES, trace residue at UES; residue noted lower in the esophagus in AP view, anxiety during trials - cues needed to swallow trial 5   VFSS Eval: Soft & Bite Sized   Mode of Presentation spoon   Order of Presentation 7   Preparatory Phase Poor bolus control   Oral Phase Effortful AP movement;Residue in  oral cavity   Pharyngeal Phase   (no delay)   Rosenbek's Penetration Aspiration Scale 1 - no aspiration, contrast does not enter airway   Diagnostic Statement Mild decreased base of tongue function, tight UES, trace residue at UES, trace base of tongue residue   VFSS Eval: Regular Texture Trial (Solid)   Mode of Presentation spoon   Order of Presentation 8   Preparatory Phase Poor bolus control   Oral Phase Effortful AP movement;Residue in oral cavity;Premature pharyngeal entry   Pharyngeal Phase   (no delay)   Rosenbek's Penetration Aspiration Scale 1 - no aspiration, contrast does not enter airway   Diagnostic Statement Mild decreased base of tongue function, tight UES, trace residue at UES,  base of tongue residue   Swallow Compensations   Swallow Compensations Alternate viscosity of consistencies   Educational Assessment   Barriers to Learning Reading;Visual   Preferred Learning Style Reading;Pictures/video;Listening;Demonstration   Esophageal Phase of Swallow   Patient reports or presents with symptoms of esophageal dysphagia Yes   Esophageal comments Hx of GERD symptoms, tight UES with trace residue, lower esophageal residue noted in AP view   Swallow Eval: Clinical Impressions   Skilled Criteria for Therapy Intervention Skilled criteria met.  Treatment indicated.   Functional Assessment Scale (FAS) 5   Dysphagia Outcome Severity Scale (KEMI) Level 5 - KEMI   Treatment Diagnosis Mild oral-pharyngeal dysphagia, signs of esophageal dysphagia   Diet texture recommendations Easy to Chew diet (level 7);Regular diet;Thin liquids (level 0)   Recommended Feeding/Eating Techniques   (see below)   Rehab Potential good, to achieve stated therapy goals   Risks and Benefits of Treatment have been explained. Yes   Clinical Impression Comments Pt presents with mild oral-pharyngeal dysphagia and signs of esophageal dysphagia per today's clinical swallow evaluation and VFSS.  Deficits include effortful AP movement, mild  decreased base of tongue function, oral residue, loss of bolus, delay to the pyriforms at times with thin liquids, slight decreased epiglottic closure, and tight UES with trace residue at UES and below at times.  Deficits resulted in min-mild oral and base of tongue residue and flash min penetration x 2 with thin liquids per recording review.   Lower esophageal residue was noted in AP view.  Pt complained of globus sensation during the study which appears to be related to esophageal function.      Recommend picking Easy to Chew IDDSI DIet level 7 textures from a regular diet and thin liquids with safe swallow strategies including: sit at 90 degrees, stay up for 60 minutes after eating, slow rate, small bites and sips, add moisture/sauces to food, alternate textures, small bites/sips. slow rate, smaller meals at a sitting, consider protein shakes to increase calories.  Educated pt on above strategies as well as some exercises she can complete at home to improve base of tongue function per her request.  Pt said she was still anxious about eating and would like OP clinical SLP swallow Tx.  Recommend short term swallow Tx to assess po tolerance, train strategies, and complete exercises.  Recommend consideration of an esophagram and/or GI consult to fully assess esophageal function and possible Tx options to decrease pt's globus sensation and increase po intake.  Pt's soft palate including uvula may be enlarged reducing some base of tongue motion.  Consider ENT consult as felt indicated.   Total Session Time   SLP Eval: oral/pharyngeal swallow function, clinical minutes (20350) 15   SLP Eval: VideoFluoroscopic Swallow function Minutes (23921) 62

## 2023-02-16 ENCOUNTER — TELEPHONE (OUTPATIENT)
Dept: FAMILY MEDICINE | Facility: CLINIC | Age: 33
End: 2023-02-16

## 2023-02-16 ENCOUNTER — OFFICE VISIT (OUTPATIENT)
Dept: FAMILY MEDICINE | Facility: CLINIC | Age: 33
End: 2023-02-16
Payer: MEDICAID

## 2023-02-16 VITALS
SYSTOLIC BLOOD PRESSURE: 112 MMHG | DIASTOLIC BLOOD PRESSURE: 68 MMHG | RESPIRATION RATE: 15 BRPM | WEIGHT: 124.2 LBS | BODY MASS INDEX: 24.39 KG/M2 | TEMPERATURE: 98.2 F | HEART RATE: 84 BPM | HEIGHT: 60 IN | OXYGEN SATURATION: 99 %

## 2023-02-16 DIAGNOSIS — R13.10 DYSPHAGIA, UNSPECIFIED TYPE: ICD-10-CM

## 2023-02-16 DIAGNOSIS — R25.1 TREMOR: ICD-10-CM

## 2023-02-16 DIAGNOSIS — I63.9 CEREBROVASCULAR ACCIDENT (CVA), UNSPECIFIED MECHANISM (H): Primary | ICD-10-CM

## 2023-02-16 DIAGNOSIS — M05.79 RHEUMATOID ARTHRITIS INVOLVING MULTIPLE SITES WITH POSITIVE RHEUMATOID FACTOR (H): ICD-10-CM

## 2023-02-16 PROBLEM — Z22.7 TB LUNG, LATENT: Status: ACTIVE | Noted: 2017-04-03

## 2023-02-16 PROBLEM — M35.9 DIFFUSE CONNECTIVE TISSUE DISEASE (H): Status: ACTIVE | Noted: 2017-04-28

## 2023-02-16 PROBLEM — N63.10 BREAST MASS, RIGHT: Status: ACTIVE | Noted: 2023-02-16

## 2023-02-16 PROBLEM — M35.01 SJOGREN'S SYNDROME WITH KERATOCONJUNCTIVITIS SICCA (H): Status: ACTIVE | Noted: 2017-04-28

## 2023-02-16 PROBLEM — R76.8 SS-A ANTIBODY POSITIVE: Status: ACTIVE | Noted: 2017-04-03

## 2023-02-16 PROBLEM — N63.20 BREAST MASS, LEFT: Status: ACTIVE | Noted: 2023-02-16

## 2023-02-16 PROBLEM — H04.123 DRY EYES, BILATERAL: Status: ACTIVE | Noted: 2021-01-25

## 2023-02-16 PROCEDURE — 99205 OFFICE O/P NEW HI 60 MIN: CPT | Performed by: PHYSICIAN ASSISTANT

## 2023-02-16 ASSESSMENT — PAIN SCALES - GENERAL: PAINLEVEL: MODERATE PAIN (4)

## 2023-02-16 NOTE — PROGRESS NOTES
Assessment & Plan     Cerebrovascular accident (CVA), unspecified mechanism (H)  Has Neuro follow up.  Will contact provider to see if they can get her in sooner to answer questions.  No acute concerns at this time.    Dysphagia, unspecified type  Had video swallow study done- patient mentions they discussed evaluating the esophagus as well.  Will refer to GI and to speech therapy for ongoing evaluation.  - Adult GI  Referral - Consult Only; Future  - Speech Therapy Referral; Future    Tremor  Ongoing- thought previously could be anxiety related.  She will need to discuss further with Neurology when she see's them.    Rheumatoid arthritis involving multiple sites with positive rheumatoid factor (H)  Continue care with Rheumatology.      [facility]  Review of external notes as documented elsewhere in note  75 minutes spent on the date of the encounter doing chart review, review of outside records, review of test results, patient visit, documentation and discussion with other provider(s)      MED REC REQUIRED  Post Medication Reconciliation Status: discharge medications reconciled, continue medications without change  CONSULTATION/REFERRAL to Speech therapy, GI and has Neurology referral    Return in about 6 weeks (around 3/30/2023) for Neurology.    Elie Adam PA-C  [facility] MELISSA Ureña is a 32 year old, presenting for the following health issues:  Establish Care, Hospital F/U (Does not understand all of her test results and has a future appointment with neurology   states continue with mild tremors and is worried. ), and Weight Loss (Has been loosing weight )      HPI     Post Discharge Outreach 2/3/2023   Admission Date 2/1/2023   Reason for Admission Chronic left cerebellar CVA with L PICA occlusion, ESUS  Mild oropharyngeal dysphagia  Elevated troponin:  "Resolved  Near syncope  Palpitations  RA  Sjogren's syndrome  Anxiety  Hx of panic attack in 2/2022  Hx of TB, s/p treatment   Discharge Date 2/2/2023   Discharge Diagnosis Chronic left cerebellar CVA with L PICA occlusion, ESUS  Mild oropharyngeal dysphagia  Elevated troponin: Resolved  Near syncope  Palpitations  RA  Sjogren's syndrome  Anxiety  Hx of panic attack in 2/2022  Hx of TB, s/p treatment   How are you doing now that you are home? \"I'm feeling perfectly fine right now but when I'm eating I feel like something funny on the upper side of head where my eyebrow is, feels kind of funny, my mind gets kind of confused when chewing/eating, when my jaws are moving.\"  Still some \"tremors\" on the L arm and leg.  Had 30 day cardiac event monitor placed today and also had an echo done today as well.   How are your symptoms? (Red Flag symptoms escalate to triage hotline per guidelines) Unchanged   Do you feel your condition is stable enough to be safe at home until your provider visit? Yes   Does the patient have their discharge instructions?  Yes   Does the patient have questions regarding their discharge instructions?  No   Were you started on any new medications or were there changes to any of your previous medications?  Yes   Does the patient have all of their medications? Yes   Do you have questions regarding any of your medications?  No   Do you have all of your needed medical supplies or equipment (DME)?  (i.e. oxygen tank, CPAP, cane, etc.) Yes   Discharge follow-up appointment scheduled within 14 calendar days?  No     Hospital Follow-up Visit:    Hospital/Nursing Home/IP Rehab Facility: [facility]  Date of Admission: 2/1/2023  Date of Discharge: 2/2/2023  Reason(s) for Admission: In January was seen in the ER and diagnosed with \"an old stroke\"  On 2/1/2023 Trouble swallowing food and water and left arm numbness and trembling and SOB     Was your hospitalization related to COVID-19? " No   Problems taking medications regularly:  None  Medication changes since discharge: None  Problems adhering to non-medication therapy:  None    Summary of hospitalization:  [facility] hospital discharge summary reviewed  Diagnostic Tests/Treatments reviewed.  Follow up needed: YES  Other Healthcare Providers Involved in Patient s Care:         Specialist appointment - Neurology, Imaging and Procedures  Update since discharge: same- no change Plan of care communicated with patient     Patient was hospitalized for CVA from 2/1-2/2    Previous patient at [facility].  Was being seen for tremors thought to maybe be anxiety related.    Ended up at [facility] ED on 2/1/23 and admitted for observation for evaluation of near syncope and dysphagia, with clinical picture concerning for TIA vs CVA.    Hospital course:    -CTA head/neck on 2/1 showed a probable variant arterial supply of the left PICA territory. No evidence of dissection.  To be seen by Neuro for follow up.  Started ASA.  Hypercoag work up negative.  Today wearing zio patch for more eval.  -oropharyngeal dysphagia.  Just had video swallow study done yesterday.  Symptoms ongoing at this time.    Follow-up and recommended labs and tests      Follow up with your PCP in 1-2 weeks  Follow up with stroke team in 6-8 weeks.  Follow up with your rheumatologist as scheduled.  Follow up with outpatient speech therapist for further evaluation with a video swallow study.         Rheumatology appointment on the 20th- RA since age 18.  Used to be on Humira and was changed to diff injection.  Isn't currently using any Rheum medications due to concerns of possible side effects.  Neurology appointment not until April- wanting to get some answers sooner if possible.  Has had ongoing tremors for a while now  Swallowing- not eating well, losing weight.  Solid foods, gets full easily.  Has sjogrens and RA - related?          Review of Systems   Constitutional, HEENT,  "cardiovascular, pulmonary, gi and gu systems are negative, except as otherwise noted.      Objective    /68   Pulse 84   Temp 98.2  F (36.8  C) (Oral)   Resp 15   Ht 1.529 m (5' 0.2\")   Wt 56.3 kg (124 lb 3.2 oz)   SpO2 99%   BMI 24.10 kg/m    Body mass index is 24.1 kg/m .  Physical Exam   GENERAL: healthy, alert and no distress  EYES: Eyes grossly normal to inspection, PERRL and conjunctivae and sclerae normal  MS: no gross musculoskeletal defects noted, no edema  SKIN: no suspicious lesions or rashes  NEURO: Normal strength and tone, mentation intact and speech normal  PSYCH: mentation appears normal and anxious                "

## 2023-02-16 NOTE — TELEPHONE ENCOUNTER
Called Kacey Strickland NP at [phone], no answer.  LMTCB.    Mili Hanson RN, BSN  [facility]

## 2023-02-16 NOTE — TELEPHONE ENCOUNTER
Anyone willing to give this NP a phone call?  Kacey Strickland  [phone]  [place]      I'd like to see if she will see Adelita sooner than April for her stroke follow up.  Patient has a lot of concerns and questions.  Some ongoing symptoms.      If I need to do provider to provider call let me know.      Elie

## 2023-02-17 NOTE — TELEPHONE ENCOUNTER
Can we try again today?  I am wondering if they can see her sooner than April.  Patient has a lot of questions/concerns and ongoing symptoms.    Thanks.

## 2023-02-24 ENCOUNTER — TRANSFERRED RECORDS (OUTPATIENT)
Dept: HEALTH INFORMATION MANAGEMENT | Facility: CLINIC | Age: 33
End: 2023-02-24
Payer: MEDICAID

## 2023-03-13 ENCOUNTER — HOSPITAL ENCOUNTER (OUTPATIENT)
Dept: SPEECH THERAPY | Facility: CLINIC | Age: 33
Discharge: HOME | End: 2023-03-13
Attending: PHYSICIAN ASSISTANT
Payer: MEDICAID

## 2023-03-13 ENCOUNTER — HOSPITAL ENCOUNTER (OUTPATIENT)
Dept: GENERAL RADIOLOGY | Facility: CLINIC | Age: 33
Discharge: HOME | End: 2023-03-13
Attending: NURSE PRACTITIONER | Admitting: NURSE PRACTITIONER
Payer: MEDICAID

## 2023-03-13 DIAGNOSIS — R13.12 OROPHARYNGEAL DYSPHAGIA: ICD-10-CM

## 2023-03-13 DIAGNOSIS — R13.10 DYSPHAGIA, UNSPECIFIED TYPE: Primary | ICD-10-CM

## 2023-03-13 DIAGNOSIS — R13.19 ESOPHAGEAL DYSPHAGIA: ICD-10-CM

## 2023-03-13 PROCEDURE — 255N000001 HC RX 255: Performed by: NURSE PRACTITIONER

## 2023-03-13 PROCEDURE — 74221 X-RAY XM ESOPHAGUS 2CNTRST: CPT

## 2023-03-13 PROCEDURE — 92610 EVALUATE SWALLOWING FUNCTION: CPT | Mod: GN

## 2023-03-13 RX ADMIN — ANTACID/ANTIFLATULENT 4 G: 380; 550; 10; 10 GRANULE, EFFERVESCENT ORAL at 11:04

## 2023-03-14 NOTE — PROGRESS NOTES
[facility]          OUTPATIENT SWALLOW  EVALUATION  PLAN OF TREATMENT FOR OUTPATIENT REHABILITATION  (COMPLETE FOR INITIAL CLAIMS ONLY)  Patient's Last Name, First Name, M.I.  YOB: 1990  Johan Adelita Bosch        Provider's Name   [facility]   Medical Record No.  [id number]     Start of Care Date:  03/13/23   Onset Date:  02/16/23 (date of order)   Type:     ___PT   ____OT  ___X_SLP Medical Diagnosis:  Dysphagia, unspecified type (R13.10)     Treatment Diagnosis:  Mild oral-pharyngeal dysphagia, signs of esophageal dysphagia Visits from SOC:  1     _________________________________________________________________________________  Plan of Treatment/Functional Goals:  Planned Therapy Interventions: Dysphagia Treatment  Dysphagia treatment: Oropharyngeal exercise training, Modified diet education, Instruction of safe swallow strategies, Compensatory strategies for swallowing                     Goals   1. Goal Identifier: Strategies       Goal Description: Patient will verbalize and/or demonstrate understanding of safe swallow strategies and pharyngeal esophageal clearance  HEP, and recommendations with 100% accuracy given min-no cues across 3 consecutive sessions.       Target Date: 06/11/23           2. Goal Identifier: PO tolerance       Goal Description: Patient will tolerate easy to chew- solids and thin liquids with less than 2 daily events of  s/sx of aspiration per pt report/aspiration journal across three  consecutive sessions       Target Date: 06/11/23           3. Goal Identifier: Oropharyngeal exercises       Goal Description: Patient will learn and  complete oropharyngeal exercises 2-3x daily given min assist, to increase strength and improve safety with PO intake       Target Date: 06/11/23                                      Therapy Frequency: other (see comments) (1x/weekfor  3 weeks - tapering off as needed)  Predicted Duration of Therapy Intervention (days/wks): 90 days    Dania Gabriel, SLP       I CERTIFY THE NEED FOR THESE SERVICES FURNISHED UNDER        THIS PLAN OF TREATMENT AND WHILE UNDER MY CARE     (Physician co-signature of this document indicates review and certification of the therapy plan).                Certification date from: 03/13/23 Certification date to: 06/11/23          Referring Physician: Elie Adam PA-C    Initial Assessment        See Epic Evaluation Start Of Care Date: 03/13/23 03/13/23 1500       Present No   General Information   Type Of Visit Initial   Start Of Care Date 03/13/23   Referring Physician Elie Adam PA-C   Orders Evaluate And Treat   Medical Diagnosis Dysphagia, unspecified type (R13.10)   Onset Of Illness/injury Or Date Of Surgery 02/16/23  (date of order)   Hearing Judged to be WNL during clinical setting   Pertinent History of Current Problem/OT: Additional Occupational Profile Info Pt is a 32 year old female w/ PMHx signficant for left cerebellar CVI, recent left sided numbness and tingling including her face and tongue, Sjogren's, RA, 7 pound weight loss recently, feeling of choking on food, need to spit food out, increase throat clearing with PO including thin liquids, feeling of SOB, globus sensation, and feeling of reflux of all times. She was referred to OP SLP for dysphagia intervention following recent VFSS completions and recommendations.   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   Patient/family  Goals Pt noted personal goal, 'to reduce feeling in throat and swallow normal'   General Information Comments Pt verbally endorsed anxiety regarding recent VFSS results as well as clinical swallow evaluation taking place today. She was provided skilled review of VFSS interpretations, results, and recommendations. Pt asked a lot of questions for clarification of information provided in which SLP was happy to help and answered all questions. Completed esophagram on this date earlier in the day in which,  no mucosal abnormalities. There are no strictures, webs, or filling defects. Normal esophageal motility. Small spontaneous gastroesophageal reflux. Otherwise normal esophagram.    Abuse Screen (yes response referral indicated)   Feels Unsafe at Home or Work/School no   Feels Threatened by Someone no   Does Anyone Try to Keep You From Having Contact with Others or Doing Things Outside Your Home? no   Physical Signs of Abuse Present no   Clinical Swallow Evaluation   Oral Musculature generally intact  (min lingual asymmetry. Englarged soft palate including uvula)   Dentition present and adequate   Mucosal Quality dry   Mandibular Strength and Mobility intact   Oral Labial Strength and Mobility WFL   Lingual Strength and Mobility WFL   Buccal Strength and Mobility intact   Laryngeal Function Cough;Throat clear;Swallow;Voicing initiated;Dry swallow palpated   Additional Documentation Yes   Swallow Eval   Feeding Assistance no assistance needed   Clinical Swallow Eval: Thin Liquid Texture Trial   Mode of Presentation, Thin Liquids cup;straw;self-fed   Volume of Liquid or Food Presented x5 cup sips of water x 4 straw stips of applejuice   Oral Phase of Swallow Premature pharyngeal entry;Delayed AP movement  (Suspect reduced BOT given delayed AP movenet - VFSS confirmed premature pharyngeal entry)   Pharyngeal Phase of Swallow reduction in laryngeal movement;repeated swallows   Diagnostic Statement Pt demonstrates repeated  swallows on all thin liquid intake of varying sizes. reduced laryngeal elevation upon palpation visualization   Clinical Swallow Eval: Puree Solid Texture Trial   Mode of Presentation, Puree straw;self-fed   Volume of Puree Presented 1/2 pudding cup   Oral Phase, Puree Effortful AP movement;Residue in oral cavity   Oral Residue, Puree mid posterior tongue   Pharyngeal Phase, Puree repeated swallows;feeling of something stuck in throat   Diagnostic Statement Pt demonstrates efforful swallow of puree w/ minimal oral stasis on mid posterior lingual, repeated swallows persistent.   Clinical Swallow Eval: Soft & Bite-sized   Mode of Presentation spoon;self-fed   Volume Presented x 3 kaushik cracker in puree   Oral Phase Delayed AP movement;Residue in oral cavity;Effortful AP movement;Premature pharyngeal entry   Oral Residue mid posterior tongue   Pharyngeal Phase repeated swallows;feeling of something stuck in throat   Diagnostic Statement Pt demonstrates effotrful swallow of soft and bite size w/ minimal oral stasis on mid posterior lingual, repeated swallows persistent.   Clinical Swallow Eval: Regular (Solid)   Mode of Presentation self-fed   Volume Presented 1 kaushik cracker   Oral Phase Delayed AP movement;Effortful AP movement;Residue in oral cavity;Premature pharyngeal entry   Oral Residue mid posterior tongue   Pharyngeal Phase repeated swallows;reduction in laryngeal movement;feeling of something stuck in throat   Diagnostic Statement Pt demonstrates effortful AP movement w/ slightly delayed swallow initiation - suspect premature pharyngeal entry (confirmed on recent VFSS) w/ minimal oral stasis on mid posterior lingual following swallow. Repeated swallows per PO bite intake   Swallow Compensations   Swallow Compensations Alternate viscosity of consistencies   Educational Assessment   Barriers to Learning Reading;Visual   Preferred Learning Style Reading;Pictures/video;Listening;Demonstration   Esophageal Phase  of Swallow   Patient reports or presents with symptoms of esophageal dysphagia Yes   Esophageal comments Completed esophagram on this date earlier in the day in which,  no mucosal abnormalities. There are no strictures, webs, or filling defects. Normal esophageal motility. Small spontaneous gastroesophageal reflux. Otherwise normal esophagram.    General Therapy Interventions   Planned Therapy Interventions Dysphagia Treatment   Dysphagia treatment Oropharyngeal exercise training;Modified diet education;Instruction of safe swallow strategies;Compensatory strategies for swallowing   Swallow Eval: Clinical Impressions   Skilled Criteria for Therapy Intervention Skilled criteria met.  Treatment indicated.   Functional Assessment Scale (FAS) 5   Dysphagia Outcome Severity Scale (KEMI) Level 5 - KEMI   Treatment Diagnosis Mild oral-pharyngeal dysphagia, signs of esophageal dysphagia   Diet texture recommendations Easy to Chew diet (level 7);Regular diet;Thin liquids (level 0)   Recommended Feeding/Eating Techniques alternate between small bites and sips of food/liquid;maintain upright posture during/after eating for 30 mins;small sips/bites   Rehab Potential good, to achieve stated therapy goals   Therapy Frequency other (see comments)  (1x for 3 weeks - tapering off as needed)   Predicted Duration of Therapy Intervention (days/wks) 90 days   Anticipated Discharge Disposition home   Risks and Benefits of Treatment have been explained. Yes   Patient, family and/or staff in agreement with Plan of Care Yes   Clinical Impression Comments Pt presents with oropharyngeal on clinical swallow evaluation w/ confirmed esophageal dysphagia per esophagram and MD report. Clinical deficits include: effortful AP movement, intermittent delayed swallow,  suspected (confirmed on VFSS) decreased BOT, suspected (confirmed on VFSS) premature pharyngeal entry w/ repeated swallows on every single PO intake along w/ menial oral stasis and  consistent reports of globus sensations, consistent laryngeal elevation reduction per palpation and visualization.     Given recent VFSS and esophageal results as well as clinical swallow evaluation on this date - RECOMMEND: picking from a easy to chew (IDDSI level 7) textures from a regular diet and thin liquids with safe swallow strategies): upright and midline at 90 degrees posture during PO intake and remain 60 min after, slow rate of PO intake, small/single bites/sips, alternate b/w solids and liquids, add moisture/sauces to foods, consider proteins shake to increase calories. Further recommended a course of skilled OP SLP dysphagia intervention x1/week for 3 weeks - tapering off as needed to assess PO tolerance w/ training and use of safe swallow and pharyngeal clearance strategies, and training of oropharyngeal swallow strengthening exercises to further protect airway, maximize swallow safety, increase PO intake, and reduce risk of aspiration. SLP to monitor enlarged uvula and potential ENT consult. (Pt stated that she has ENT scheduled however unable to find documented on her chart)   Swallow Goals   SLP Swallow Goals 1;2;3   Swallow Goal 1   Goal Identifier Strategies   Goal Description Patient will verbalize and/or demonstrate understanding of safe swallow strategies and pharyngeal esophageal clearance  HEP, and recommendations with 100% accuracy given min-no cues across 3 consecutive sessions.   Target Date 06/11/23   Swallow Goal 2   Goal Identifier PO tolerance   Goal Description Patient will tolerate easy to chew- solids and thin liquids with less than 2 daily events of  s/sx of aspiration per pt report/aspiration journal across three  consecutive sessions   Target Date 06/11/23   Swallow Goal 3   Goal Identifier Oropharyngeal exercises   Goal Description Patient will learn and  complete oropharyngeal exercises 2-3x daily given min assist, to increase strength and improve safety with PO intake   Target  Date 06/11/23   Total Session Time   SLP Eval: oral/pharyngeal swallow function, clinical minutes ([id number]) 45   Total Evaluation Time 45   Therapy Certification   Certification date from 03/13/23   Certification date to 06/11/23   Medical Diagnosis Dysphagia, unspecified type (R13.10)   Certification I certify the need for these services furnished under this plan of treatment and while under my care.  (Physician co-signature of this document indicates review and certification of the therapy plan).

## 2023-03-15 NOTE — PROGRESS NOTES
__________________________________________________________    ___________________________________  ESTABLISHED PATIENT NEUROLOGY NOTE    DATE OF VISIT: 3/16/2023  MRN: [id number]  PATIENT NAME: Adeltia Bosch  YOB: 1990    Chief Complaint: Patient presents with:  Stroke: Patient has many questions    SUBJECTIVE:                                                      History of Present Illness: Adelita Bosch is a 32 year old female presenting for follow-up for chronic lacunar infarct left cerebellum.      She was hospitalized at [facility] on 01/15/2023. Prior to the hospital stay, she had a past medical history of rheumatoid arthritis, Sjogren's syndrome and TB.     Per chart review, she presented to the hospital with 1 month of tremors and paraesthesias to the hands and face. 4 days ago, she had these symptoms and was seen at her clinic. It was determined that she had an adverse reaction to Plaquenil and was advised to follow up with her rheumatologist. She was also prescribed sertraline but she states she has not been taking this.  Yesterday, her symptoms recurred along with a headache, myalgias, and nausea. Today, she developed full body tremors, prompting her to be brought to the ED. She notes history of tuberculosis 9 months ago, which she was treated successfully for, as well as history of rheumatoid arthritis. She denies recent travel.     Labs ordered as noted above significant for leukocytosis, negative viral studies, UA without infection.  Checks x-ray with no evidence of acute pneumonia.  given fluids as noted above with episodes of transient hypotension.  Throughout her ED course however, the patient reports feeling significantly improved.  Given her tonsillitis and obvious source of infection, She was treated with Augmentin on outpatient basis. Her blood pressure stabilized in the emergency department prior to discharge     01/19/23: Adelita arrives for her virtual  appointment for post stroke follow-up.  She was seen in the emergency department on 01/15/23. Her work-up revealed a chronic lacunar infarct left cerebellum.  She states that in December she fell on a wet floor and hit her head.  She did not lose consciousness but states that she saw stars.  She also reports that in December she had a tremor in her left arm and leg.  She reports that she noticed more recently that she is weaker on her left side than her right.  No problems with ambulation.  She has some difficulty swallowing food.  She was assessed by her primary who states she may have acid reflux.  She was seen and evaluated for tremors in the past.  They thought it could be related to her anxiety and started her a medication to help but it was not beneficial.  She is prescribed Cimzia for her rheumatoid arthritis but states she has taken the last dose.  She feels this medication could be contributing to her symptoms.     Adelita also states that her smart watch often tracks her HR in the 140's - 150's range with minimal activity.  At times she has shortness of breath, chest pressure along and palpitations when her HR is elevated.      -Nexplanon implant in place- Birth control for the last 6 years.   -She smokes very rarely- one or two cigarettes a year.   -States that at times she wakes up gasping for breath.  Wakes up fatigued, daytime sleepiness noted  -Drinks on occasion when out with friends.   -2 children  -No history of bleeding disorders, cardiac disease or DM that she is aware of.   -Per Adelita, she was started on ABX for fever and tachycardia while in the ED. Dx tonsillitis    [facility] 02/01/23 - 02/02/23: suspicion of new dysphagia starting in the afternoon. When clarifying more info from patient, she has been having intermittent dysphagia for a few months now for both liquids and solids. It was a similar episode that she experienced yesterday but more severe than her usual episodes. Imaging MRI  showed a L chronic appearing cerebellar stroke. In addition to L PICA occlusion. CTA repeated confirming same findings.     Impression  1. Mild oral/pharyngeal dysphagia, not related to vascular etiology in the setting of known Sjogren/RA.  On bedside swallow exam, patient has signs of mild oral pharyngeal dysphagia      2. Evidence of old L cerebellar stroke, with L PICA occlusion, ESUS etiology. Obtained CTA to rule out dissection of PICA which was not convincing for dissection. Needs aspirin as secondary strokep revention. Ordered Hypercoagulable workup.    03/16/23: Adelita arrived to the clinic after being hospitalized in February.  She is accompanied by her sister.  Patient states that she had increased dysphagia which caused increased anxiety. Her heart started to race and she became short of breath and lightheaded.  Her friend brought her into the emergency department for work-up on 02/01/23.  She is working with speech therapy.  Adelita reports having mild tremors throughout her whole body, and intermittent numbness and tingling that is constantly moving around in different locations on her body.  She states that the numbness and tingling may be in her arm for half an hour before moving to her leg.  In the clinic today she had numbness and tingling in her bilateral lower extremities and left hand.  She feels that the symptoms worsen right after her Cimzia injections and around her menstrual cycle.  She feels these symptoms started after switching RA medications in preparation of conceiving a child. She does not remember experiencing these symptoms on past RA medications.  She plans to talk to her rheumatologist.  We reviewed her imaging, EEG, and lab work at this appointment.    Adelita had her birth control removed and quit smoking completely.  No known modifiable vascular risk factors to review.  Cardiac event monitor without A-fib.  No evidence of seizures on EEG. Hypercoagulable workup unremarkable.       Current Prevention Medications: Taking as prescribed  Aspirin 81 mg tablet daily    Perceived Side Effects: No    Medication Notes:   AED Medication Compliance:  compliant      Psycho-Social History: Adelita Bosch currently lives [place], with  and two kids. Highest level of education 9th grade. Employment status: Not working     Patient does smoke on rare occasions, some alcohol use, no recreational drug use.  Currently, patient denies feeling depressed, denies feeling anhedonia, denies suicidal  thoughts, and denies having feelings of excessive guilt/worthlessness.  We reviewed importance of mental and emotional wellbeing and impact on health.      Current Medications:   albuterol (PROAIR HFA/PROVENTIL HFA/VENTOLIN HFA) 108 (90 Base) MCG/ACT inhaler, Inhale 2 puffs into the lungs every 6 hours as needed for shortness of breath, wheezing or cough  aspirin (ASA) 81 MG EC tablet, Take 1 tablet (81 mg) by mouth daily  CALCIUM PO, Take 1 tablet by mouth daily  certolizumab pegol (CIMZIA) 2 X 200 MG injection 2 vials/kit, Inject 1 Syringe Subcutaneous every 14 days  Cyanocobalamin (VITAMIN B-12 PO), Take 1 tablet by mouth daily  folic acid (FOLVITE) 1 MG tablet,   hydroxychloroquine (PLAQUENIL) 200 MG tablet, Take 300 mg by mouth daily  MAGNESIUM PO, Take 1 tablet by mouth daily  POTASSIUM PO, Take 1 tablet by mouth daily  predniSONE (DELTASONE) 2.5 MG tablet, Take 2.5 mg by mouth daily For RA. When feeling a flare up, take 2 tabs (5mg) daily.  VITAMIN A PO, Take 1 tablet by mouth daily  VITAMIN D PO, Take 1 tablet by mouth daily  sertraline (ZOLOFT) 25 MG tablet, Take 25 mg by mouth daily (Patient not taking: Reported on 3/16/2023)    No current facility-administered medications on file prior to visit.    Past Medical History:   Patient  has a past medical history of Rheumatoid arthritis (H).  Surgical History:  She  has no past surgical history on file.  Family and Social History:  Reviewed, and she   reports that she has never smoked. She does not have any smokeless tobacco history on file. She reports current alcohol use. She reports that she does not use drugs.  Reviewed, and family history is not on file.    RECENT DIAGNOSTIC STUDIES:   Labs:    Coagulation studies:  Recent Labs   Lab Test 02/02/23  1002   INR 1.05  1.06        Lipid panel:  Recent Labs   Lab Test 01/25/23  1131   CHOL 177   HDL 60   LDL 93   TRIG 118       HbA1C:  Recent Labs   Lab Test 01/25/23  1131   A1C 5.0       Troponin:  No lab results found.  No lab results found.  No lab results found.    Imaging:   EXAM: MR BRAIN W/O and W CONTRAST, MRA NECK (CAROTIDS) W/O and W CONTRAST, MRA BRAIN ([place] [facility]) W/O CONTRAST  LOCATION: [facility]  DATE/TIME: 2/1/2023 7:15 PM  IMPRESSION:  HEAD MRI:  1.  No acute ischemia, mass/mass effect, or abnormal intracranial enhancement.   2.  Chronic left inferior cerebellar infarct.     HEAD MRA:  1.  No proximal cerebral artery branch vessel occlusion, aneurysm, or vascular malformation.   2.  No flow related enhancement corresponding with the left PICA. While this can be seen due to diminutive vessel caliber/hypoplasia, the presence of chronic ipsilateral PICA territory infarct may indicate a component of underlying chronic flow compromise.     NECK MRA:  1.  No flow-limiting stenosis or findings of dissection.    CT ANGIOGRAM OF THE HEAD WITH CONTRAST  2/2/2023 11:05 AM   IMPRESSION:  1. No high-grade stenosis or large vessel occlusion involving the  major intracranial arteries.  2. Probable variant arterial supply of the left PICA territory, as  described.  3. Patent cervical carotid and vertebral arteries without stenosis or  dissection.  4. No intracranial aneurysm or high-flow vascular malformation.    EXAM: CT HEAD W/O CONTRAST  LOCATION: [facility]  DATE/TIME: 1/15/2023 6:58 PM  IMPRESSION:  1.  No acute intracranial process.  2.  Chronic  "lacunar infarct left cerebellum.    ECHO   Interpretation Summary  A contrast injection (Bubble Study) was performed that was negative for flow  across the interatrial septum.  Normal echocardiogram.  Compared to the prior study dated 2/2/2023, there have been no changes.    Cardiac event monitor from 2/3/2023 through 3/4/2023  -No significant arrhythmias    Hypercoag work-up   - Unremarkable    EEG 01/30/23  Impression: This is a normal awake  EEG.  Please note that the absence of epileptiform abnormalities on EEG does not rule out the possibility of seizures.  Classification: Normal awake     MRI 05/14/2019  IMPRESSION:   1. No acute intracranial abnormality such as infarct or bleed. No evidence of intracranial mass lesion or abnormal enhancement. Incidental arachnoid cyst is seen in the patient's left middle cranial fossa.     REVIEW OF SYSTEMS:                                                      10-point review of systems is negative except as mentioned above in HPI.    EXAM:                                                      Physical Exam:   Vitals: BP 96/42   Pulse 83   Wt 55.1 kg (121 lb 6.4 oz)   LMP  (Within Days)   .4 cm (60\")   BMI 23.55 kg/m    BMI= Body mass index is 23.55 kg/m .  GENERAL: NAD.  HEENT: NC/AT.  PULM: Non-labored breathing.   Neurologic:  MENTAL STATUS: Alert, attentive. Speech is fluent. Normal comprehension. Normal concentration. Adequate fund of knowledge.   CRANIAL NERVES: Visual fields intact to confrontation. Pupils equally, round and reactive to light. Facial sensation and movement normal. EOM full. Hearing intact to conversation. Trapezius strength intact. Palate moves symmetrically. Tongue midline.  MOTOR: 5/5 in proximal and distal muscle groups of upper and lower extremities. Tone and bulk normal.   DTRs: Intact and symmetric in biceps, BR, triceps and patellae.   SENSATION: Normal light touch throughout.   COORDINATION: Normal finger nose finger. Finger tapping " normal. Knee heel shin normal.  STATION AND GAIT: Romberg Negative. Good postural reflexes. Casual gait and tandem Normal  right hand-dominant.      ASSESSMENT and PLAN:                                                      Assessment:    ICD-10-CM    1. TIA (transient ischemic attack)  G45.9 Stroke Hospital Follow Up        Adelita Bosch is a 32 year old female presenting for follow-up for chronic lacunar infarct left cerebellum. She was hospitalized at [facility] on 01/15/2023 for tremors and paraesthesias to the hands and face . She was hospitalized again at [facility] from 02/01/23 - 02/02/23 for dysphagia.  Prior to the hospital stay, she had a past medical history of rheumatoid arthritis, Sjogren's syndrome and TB. Adelita arrives to the clinic to discuss imaging and stroke prevention. We reviewed recent test and lab results. Adelita is working with speech therapy for dysphagia and is planning an endoscopy.  She is taking aspirin daily.  No other modifiable vascular risk factors.  We reviewed the interventions outlined below and will plan to see the patient back in July with Dr. Gutierrez.  Adelita understands and agrees with this plan    Plan:     Secondary prevention  -Continue aspirin 81 mg tablet daily    --- Plan on follow up in the Neurology Clinic with Dr. Gutierrez.  --- Please feel free to reach out if you have any further questions or concerns.  --- Seek immediate medical attention if an emergency arises or if your health becomes progressively worse.     For any acute neurologic deficits she was advised to  go directly to the hospital rather than call the clinic.    It was a pleasure to meet you today!     Total Time: Total time spent for face to face visit, reviewing labs/imaging studies, counseling and coordination of care was: 45 Hour spent on the date of the encounter doing chart review, review of test results, patient visit, documentation, discussion with other provider(s) and  discussion with family       This note was dictated using voice recognition software.  Any grammatical or context distortions are unintentional and inherent to the software.    Kacey Sullivan, DNP, APRN, CNP  [facility]

## 2023-03-16 ENCOUNTER — OFFICE VISIT (OUTPATIENT)
Dept: NEUROLOGY | Facility: CLINIC | Age: 33
End: 2023-03-16
Payer: MEDICAID

## 2023-03-16 VITALS
SYSTOLIC BLOOD PRESSURE: 96 MMHG | BODY MASS INDEX: 23.55 KG/M2 | WEIGHT: 121.4 LBS | HEART RATE: 83 BPM | DIASTOLIC BLOOD PRESSURE: 42 MMHG

## 2023-03-16 DIAGNOSIS — G45.9 TIA (TRANSIENT ISCHEMIC ATTACK): ICD-10-CM

## 2023-03-16 PROCEDURE — 99215 OFFICE O/P EST HI 40 MIN: CPT

## 2023-03-16 RX ORDER — FOLIC ACID 1 MG/1
TABLET ORAL
COMMUNITY
Start: 2023-02-20

## 2023-03-16 NOTE — PATIENT INSTRUCTIONS
Plan:     Secondary prevention  -Continue aspirin 81 mg tablet daily    --- Plan on follow up in the Neurology Clinic with Dr. Gutierrez.  --- Please feel free to reach out if you have any further questions or concerns.  --- Seek immediate medical attention if an emergency arises or if your health becomes progressively worse.     For any acute neurologic deficits she was advised to  go directly to the hospital rather than call the clinic.    It was a pleasure to meet you today!

## 2023-03-16 NOTE — NURSING NOTE
Chief Complaint   Patient presents with     Stroke     Patient has many questions     Lisseth Ho on 3/16/2023 at 9:06 AM

## 2023-03-16 NOTE — LETTER
3/16/2023         RE: Adelita Bosch  [address] [facility] [address]        Dear Colleague,    Thank you for referring your patient, Adelita Bosch, to the [facility] [place]. Please see a copy of my visit note below.    __________________________________________________________    ___________________________________  ESTABLISHED PATIENT NEUROLOGY NOTE    DATE OF VISIT: 3/16/2023  MRN: [id number]  PATIENT NAME: Adelita Bosch  YOB: 1990    Chief Complaint: Patient presents with:  Stroke: Patient has many questions    SUBJECTIVE:                                                      History of Present Illness: Adelita Bosch is a 32 year old female presenting for follow-up for chronic lacunar infarct left cerebellum.      She was hospitalized at [facility] on 01/15/2023. Prior to the hospital stay, she had a past medical history of rheumatoid arthritis, Sjogren's syndrome and TB.     Per chart review, she presented to the hospital with 1 month of tremors and paraesthesias to the hands and face. 4 days ago, she had these symptoms and was seen at her clinic. It was determined that she had an adverse reaction to Plaquenil and was advised to follow up with her rheumatologist. She was also prescribed sertraline but she states she has not been taking this.  Yesterday, her symptoms recurred along with a headache, myalgias, and nausea. Today, she developed full body tremors, prompting her to be brought to the ED. She notes history of tuberculosis 9 months ago, which she was treated successfully for, as well as history of rheumatoid arthritis. She denies recent travel.     Labs ordered as noted above significant for leukocytosis, negative viral studies, UA without infection.  Checks x-ray with no evidence of acute pneumonia.  given fluids as noted above with episodes of transient hypotension.  Throughout her ED course however, the patient  reports feeling significantly improved.  Given her tonsillitis and obvious source of infection, She was treated with Augmentin on outpatient basis. Her blood pressure stabilized in the emergency department prior to discharge     01/19/23: Adelita arrives for her virtual appointment for post stroke follow-up.  She was seen in the emergency department on 01/15/23. Her work-up revealed a chronic lacunar infarct left cerebellum.  She states that in December she fell on a wet floor and hit her head.  She did not lose consciousness but states that she saw stars.  She also reports that in December she had a tremor in her left arm and leg.  She reports that she noticed more recently that she is weaker on her left side than her right.  No problems with ambulation.  She has some difficulty swallowing food.  She was assessed by her primary who states she may have acid reflux.  She was seen and evaluated for tremors in the past.  They thought it could be related to her anxiety and started her a medication to help but it was not beneficial.  She is prescribed Cimzia for her rheumatoid arthritis but states she has taken the last dose.  She feels this medication could be contributing to her symptoms.     Adelita also states that her smart watch often tracks her HR in the 140's - 150's range with minimal activity.  At times she has shortness of breath, chest pressure along and palpitations when her HR is elevated.      -Nexplanon implant in place- Birth control for the last 6 years.   -She smokes very rarely- one or two cigarettes a year.   -States that at times she wakes up gasping for breath.  Wakes up fatigued, daytime sleepiness noted  -Drinks on occasion when out with friends.   -2 children  -No history of bleeding disorders, cardiac disease or DM that she is aware of.   -Per Adelita, she was started on ABX for fever and tachycardia while in the ED. Dx tonsillitis    [facility] 02/01/23 - 02/02/23: suspicion of new dysphagia  starting in the afternoon. When clarifying more info from patient, she has been having intermittent dysphagia for a few months now for both liquids and solids. It was a similar episode that she experienced yesterday but more severe than her usual episodes. Imaging MRI showed a L chronic appearing cerebellar stroke. In addition to L PICA occlusion. CTA repeated confirming same findings.     Impression  1. Mild oral/pharyngeal dysphagia, not related to vascular etiology in the setting of known Sjogren/RA.  On bedside swallow exam, patient has signs of mild oral pharyngeal dysphagia      2. Evidence of old L cerebellar stroke, with L PICA occlusion, ESUS etiology. Obtained CTA to rule out dissection of PICA which was not convincing for dissection. Needs aspirin as secondary strokep revention. Ordered Hypercoagulable workup.    03/16/23: Adelita arrived to the clinic after being hospitalized in February.  She is accompanied by her sister.  Patient states that she had increased dysphagia which caused increased anxiety. Her heart started to race and she became short of breath and lightheaded.  Her friend brought her into the emergency department for work-up on 02/01/23.  She is working with speech therapy.  Adelita reports having mild tremors throughout her whole body, and intermittent numbness and tingling that is constantly moving around in different locations on her body.  She states that the numbness and tingling may be in her arm for half an hour before moving to her leg.  In the clinic today she had numbness and tingling in her bilateral lower extremities and left hand.  She feels that the symptoms worsen right after her Cimzia injections and around her menstrual cycle.  She feels these symptoms started after switching RA medications in preparation of conceiving a child. She does not remember experiencing these symptoms on past RA medications.  She plans to talk to her rheumatologist.  We reviewed her imaging, EEG, and  lab work at this appointment.    Adelita had her birth control removed and quit smoking completely.  No known modifiable vascular risk factors to review.  Cardiac event monitor without A-fib.  No evidence of seizures on EEG. Hypercoagulable workup unremarkable.      Current Prevention Medications: Taking as prescribed  Aspirin 81 mg tablet daily    Perceived Side Effects: No    Medication Notes:   AED Medication Compliance:  compliant      Psycho-Social History: Adelita Bosch currently lives [place], with  and two kids. Highest level of education 9th grade. Employment status: Not working     Patient does smoke on rare occasions, some alcohol use, no recreational drug use.  Currently, patient denies feeling depressed, denies feeling anhedonia, denies suicidal  thoughts, and denies having feelings of excessive guilt/worthlessness.  We reviewed importance of mental and emotional wellbeing and impact on health.      Current Medications:   albuterol (PROAIR HFA/PROVENTIL HFA/VENTOLIN HFA) 108 (90 Base) MCG/ACT inhaler, Inhale 2 puffs into the lungs every 6 hours as needed for shortness of breath, wheezing or cough  aspirin (ASA) 81 MG EC tablet, Take 1 tablet (81 mg) by mouth daily  CALCIUM PO, Take 1 tablet by mouth daily  certolizumab pegol (CIMZIA) 2 X 200 MG injection 2 vials/kit, Inject 1 Syringe Subcutaneous every 14 days  Cyanocobalamin (VITAMIN B-12 PO), Take 1 tablet by mouth daily  folic acid (FOLVITE) 1 MG tablet,   hydroxychloroquine (PLAQUENIL) 200 MG tablet, Take 300 mg by mouth daily  MAGNESIUM PO, Take 1 tablet by mouth daily  POTASSIUM PO, Take 1 tablet by mouth daily  predniSONE (DELTASONE) 2.5 MG tablet, Take 2.5 mg by mouth daily For RA. When feeling a flare up, take 2 tabs (5mg) daily.  VITAMIN A PO, Take 1 tablet by mouth daily  VITAMIN D PO, Take 1 tablet by mouth daily  sertraline (ZOLOFT) 25 MG tablet, Take 25 mg by mouth daily (Patient not taking: Reported on 3/16/2023)    No current  facility-administered medications on file prior to visit.    Past Medical History:   Patient  has a past medical history of Rheumatoid arthritis (H).  Surgical History:  She  has no past surgical history on file.  Family and Social History:  Reviewed, and she  reports that she has never smoked. She does not have any smokeless tobacco history on file. She reports current alcohol use. She reports that she does not use drugs.  Reviewed, and family history is not on file.    RECENT DIAGNOSTIC STUDIES:   Labs:    Coagulation studies:  Recent Labs   Lab Test 02/02/23  1002   INR 1.05  1.06        Lipid panel:  Recent Labs   Lab Test 01/25/23  1131   CHOL 177   HDL 60   LDL 93   TRIG 118       HbA1C:  Recent Labs   Lab Test 01/25/23  1131   A1C 5.0       Troponin:  No lab results found.  No lab results found.  No lab results found.    Imaging:   EXAM: MR BRAIN W/O and W CONTRAST, MRA NECK (CAROTIDS) W/O and W CONTRAST, MRA BRAIN ([place]) W/O CONTRAST  LOCATION: [facility]  DATE/TIME: 2/1/2023 7:15 PM  IMPRESSION:  HEAD MRI:  1.  No acute ischemia, mass/mass effect, or abnormal intracranial enhancement.   2.  Chronic left inferior cerebellar infarct.     HEAD MRA:  1.  No proximal cerebral artery branch vessel occlusion, aneurysm, or vascular malformation.   2.  No flow related enhancement corresponding with the left PICA. While this can be seen due to diminutive vessel caliber/hypoplasia, the presence of chronic ipsilateral PICA territory infarct may indicate a component of underlying chronic flow compromise.     NECK MRA:  1.  No flow-limiting stenosis or findings of dissection.    CT ANGIOGRAM OF THE HEAD WITH CONTRAST  2/2/2023 11:05 AM   IMPRESSION:  1. No high-grade stenosis or large vessel occlusion involving the  major intracranial arteries.  2. Probable variant arterial supply of the left PICA territory, as  described.  3. Patent cervical carotid and vertebral arteries without  "stenosis or  dissection.  4. No intracranial aneurysm or high-flow vascular malformation.    EXAM: CT HEAD W/O CONTRAST  LOCATION: [facility]  DATE/TIME: 1/15/2023 6:58 PM  IMPRESSION:  1.  No acute intracranial process.  2.  Chronic lacunar infarct left cerebellum.    ECHO   Interpretation Summary  A contrast injection (Bubble Study) was performed that was negative for flow  across the interatrial septum.  Normal echocardiogram.  Compared to the prior study dated 2/2/2023, there have been no changes.    Cardiac event monitor from 2/3/2023 through 3/4/2023  -No significant arrhythmias    Hypercoag work-up   - Unremarkable    EEG 01/30/23  Impression: This is a normal awake  EEG.  Please note that the absence of epileptiform abnormalities on EEG does not rule out the possibility of seizures.  Classification: Normal awake     MRI 05/14/2019  IMPRESSION:   1. No acute intracranial abnormality such as infarct or bleed. No evidence of intracranial mass lesion or abnormal enhancement. Incidental arachnoid cyst is seen in the patient's left middle cranial fossa.     REVIEW OF SYSTEMS:                                                      10-point review of systems is negative except as mentioned above in HPI.    EXAM:                                                      Physical Exam:   Vitals: BP 96/42   Pulse 83   Wt 55.1 kg (121 lb 6.4 oz)   LMP  (Within Days)   .4 cm (60\")   BMI 23.55 kg/m    BMI= Body mass index is 23.55 kg/m .  GENERAL: NAD.  HEENT: NC/AT.  PULM: Non-labored breathing.   Neurologic:  MENTAL STATUS: Alert, attentive. Speech is fluent. Normal comprehension. Normal concentration. Adequate fund of knowledge.   CRANIAL NERVES: Visual fields intact to confrontation. Pupils equally, round and reactive to light. Facial sensation and movement normal. EOM full. Hearing intact to conversation. Trapezius strength intact. Palate moves symmetrically. Tongue midline.  MOTOR: 5/5 " in proximal and distal muscle groups of upper and lower extremities. Tone and bulk normal.   DTRs: Intact and symmetric in biceps, BR, triceps and patellae.   SENSATION: Normal light touch throughout.   COORDINATION: Normal finger nose finger. Finger tapping normal. Knee heel shin normal.  STATION AND GAIT: Romberg Negative. Good postural reflexes. Casual gait and tandem Normal  right hand-dominant.      ASSESSMENT and PLAN:                                                      Assessment:    ICD-10-CM    1. TIA (transient ischemic attack)  G45.9 Stroke Hospital Follow Up        Adelita Bosch is a 32 year old female presenting for follow-up for chronic lacunar infarct left cerebellum. She was hospitalized at [facility] on 01/15/2023 for tremors and paraesthesias to the hands and face . She was hospitalized again at [facility] from 02/01/23 - 02/02/23 for dysphagia.  Prior to the hospital stay, she had a past medical history of rheumatoid arthritis, Sjogren's syndrome and TB. Adelita arrives to the clinic to discuss imaging and stroke prevention. We reviewed recent test and lab results. Adelita is working with speech therapy for dysphagia and is planning an endoscopy.  She is taking aspirin daily.  No other modifiable vascular risk factors.  We reviewed the interventions outlined below and will plan to see the patient back in July with Dr. Gutierrez.  Adelita understands and agrees with this plan    Plan:     Secondary prevention  -Continue aspirin 81 mg tablet daily    --- Plan on follow up in the Neurology Clinic with Dr. Gutierrez.  --- Please feel free to reach out if you have any further questions or concerns.  --- Seek immediate medical attention if an emergency arises or if your health becomes progressively worse.     For any acute neurologic deficits she was advised to  go directly to the hospital rather than call the clinic.    It was a pleasure to meet you today!     Total Time: Total time  spent for face to face visit, reviewing labs/imaging studies, counseling and coordination of care was: 45 Hour spent on the date of the encounter doing chart review, review of test results, patient visit, documentation, discussion with other provider(s) and discussion with family       This note was dictated using voice recognition software.  Any grammatical or context distortions are unintentional and inherent to the software.    Kacey Sullivan DNP, APRN, CNP  [facility]       Again, thank you for allowing me to participate in the care of your patient.        Sincerely,        MICHELLE Hutton CNP

## 2023-03-20 ENCOUNTER — HOSPITAL ENCOUNTER (OUTPATIENT)
Dept: SPEECH THERAPY | Facility: CLINIC | Age: 33
Discharge: HOME | End: 2023-03-20
Payer: MEDICAID

## 2023-03-20 DIAGNOSIS — I63.9 CEREBROVASCULAR ACCIDENT (CVA), UNSPECIFIED MECHANISM (H): ICD-10-CM

## 2023-03-20 DIAGNOSIS — R13.10 DYSPHAGIA, UNSPECIFIED TYPE: Primary | ICD-10-CM

## 2023-03-20 PROCEDURE — 92526 ORAL FUNCTION THERAPY: CPT | Mod: GN

## 2023-04-13 ENCOUNTER — OFFICE VISIT (OUTPATIENT)
Dept: FAMILY MEDICINE | Facility: CLINIC | Age: 33
End: 2023-04-13
Payer: MEDICAID

## 2023-04-13 VITALS
WEIGHT: 116.5 LBS | HEIGHT: 60 IN | TEMPERATURE: 98.4 F | RESPIRATION RATE: 15 BRPM | BODY MASS INDEX: 22.87 KG/M2 | OXYGEN SATURATION: 98 % | HEART RATE: 87 BPM | DIASTOLIC BLOOD PRESSURE: 58 MMHG | SYSTOLIC BLOOD PRESSURE: 94 MMHG

## 2023-04-13 DIAGNOSIS — M05.79 RHEUMATOID ARTHRITIS INVOLVING MULTIPLE SITES WITH POSITIVE RHEUMATOID FACTOR (H): ICD-10-CM

## 2023-04-13 DIAGNOSIS — R63.4 LOSS OF WEIGHT: ICD-10-CM

## 2023-04-13 DIAGNOSIS — R25.1 TREMOR: ICD-10-CM

## 2023-04-13 DIAGNOSIS — M35.01 SJOGREN'S SYNDROME WITH KERATOCONJUNCTIVITIS SICCA (H): ICD-10-CM

## 2023-04-13 DIAGNOSIS — Z00.00 ROUTINE GENERAL MEDICAL EXAMINATION AT A HEALTH CARE FACILITY: Primary | ICD-10-CM

## 2023-04-13 LAB
ALBUMIN SERPL BCG-MCNC: 4.6 G/DL (ref 3.5–5.2)
ALP SERPL-CCNC: 55 U/L (ref 35–104)
ALT SERPL W P-5'-P-CCNC: 12 U/L (ref 10–35)
ANION GAP SERPL CALCULATED.3IONS-SCNC: 16 MMOL/L (ref 7–15)
AST SERPL W P-5'-P-CCNC: 21 U/L (ref 10–35)
BILIRUB SERPL-MCNC: 0.7 MG/DL
BUN SERPL-MCNC: 8 MG/DL (ref 6–20)
CALCIUM SERPL-MCNC: 9.3 MG/DL (ref 8.6–10)
CHLORIDE SERPL-SCNC: 104 MMOL/L (ref 98–107)
CREAT SERPL-MCNC: 0.69 MG/DL (ref 0.51–0.95)
DEPRECATED HCO3 PLAS-SCNC: 19 MMOL/L (ref 22–29)
ERYTHROCYTE [DISTWIDTH] IN BLOOD BY AUTOMATED COUNT: 12.3 % (ref 10–15)
ERYTHROCYTE [SEDIMENTATION RATE] IN BLOOD BY WESTERGREN METHOD: 12 MM/HR (ref 0–20)
GFR SERPL CREATININE-BSD FRML MDRD: >90 ML/MIN/1.73M2
GLUCOSE SERPL-MCNC: 91 MG/DL (ref 70–99)
HCT VFR BLD AUTO: 41.5 % (ref 35–47)
HCV AB SERPL QL IA: NONREACTIVE
HGB BLD-MCNC: 14.3 G/DL (ref 11.7–15.7)
HIV 1+2 AB+HIV1 P24 AG SERPL QL IA: NONREACTIVE
MCH RBC QN AUTO: 31.5 PG (ref 26.5–33)
MCHC RBC AUTO-ENTMCNC: 34.5 G/DL (ref 31.5–36.5)
MCV RBC AUTO: 91 FL (ref 78–100)
PLATELET # BLD AUTO: 184 10E3/UL (ref 150–450)
POTASSIUM SERPL-SCNC: 4 MMOL/L (ref 3.4–5.3)
PROT SERPL-MCNC: 7.7 G/DL (ref 6.4–8.3)
RBC # BLD AUTO: 4.54 10E6/UL (ref 3.8–5.2)
SODIUM SERPL-SCNC: 139 MMOL/L (ref 136–145)
T3FREE SERPL-MCNC: 3 PG/ML (ref 2–4.4)
T4 FREE SERPL-MCNC: 1.38 NG/DL (ref 0.9–1.7)
TSH SERPL DL<=0.005 MIU/L-ACNC: 2.44 UIU/ML (ref 0.3–4.2)
WBC # BLD AUTO: 4.6 10E3/UL (ref 4–11)

## 2023-04-13 PROCEDURE — 99395 PREV VISIT EST AGE 18-39: CPT | Performed by: PHYSICIAN ASSISTANT

## 2023-04-13 PROCEDURE — 85652 RBC SED RATE AUTOMATED: CPT | Performed by: PHYSICIAN ASSISTANT

## 2023-04-13 PROCEDURE — 87389 HIV-1 AG W/HIV-1&-2 AB AG IA: CPT | Performed by: PHYSICIAN ASSISTANT

## 2023-04-13 PROCEDURE — 36415 COLL VENOUS BLD VENIPUNCTURE: CPT | Performed by: PHYSICIAN ASSISTANT

## 2023-04-13 PROCEDURE — 80053 COMPREHEN METABOLIC PANEL: CPT | Performed by: PHYSICIAN ASSISTANT

## 2023-04-13 PROCEDURE — 99213 OFFICE O/P EST LOW 20 MIN: CPT | Mod: 25 | Performed by: PHYSICIAN ASSISTANT

## 2023-04-13 PROCEDURE — 84439 ASSAY OF FREE THYROXINE: CPT | Performed by: PHYSICIAN ASSISTANT

## 2023-04-13 PROCEDURE — 86376 MICROSOMAL ANTIBODY EACH: CPT | Performed by: PHYSICIAN ASSISTANT

## 2023-04-13 PROCEDURE — 86803 HEPATITIS C AB TEST: CPT | Performed by: PHYSICIAN ASSISTANT

## 2023-04-13 PROCEDURE — 84443 ASSAY THYROID STIM HORMONE: CPT | Performed by: PHYSICIAN ASSISTANT

## 2023-04-13 PROCEDURE — 85027 COMPLETE CBC AUTOMATED: CPT | Performed by: PHYSICIAN ASSISTANT

## 2023-04-13 PROCEDURE — 84481 FREE ASSAY (FT-3): CPT | Performed by: PHYSICIAN ASSISTANT

## 2023-04-13 RX ORDER — SERTRALINE HYDROCHLORIDE 25 MG/1
25 TABLET, FILM COATED ORAL DAILY
Qty: 30 TABLET | Refills: 1 | Status: SHIPPED | OUTPATIENT
Start: 2023-04-13 | End: 2023-05-15

## 2023-04-13 ASSESSMENT — ENCOUNTER SYMPTOMS
WEAKNESS: 1
JOINT SWELLING: 1
NAUSEA: 0
FREQUENCY: 1
EYE PAIN: 0
DYSURIA: 0
ABDOMINAL PAIN: 0
SHORTNESS OF BREATH: 1
MYALGIAS: 0
HEADACHES: 1
PARESTHESIAS: 0
NERVOUS/ANXIOUS: 1
HEARTBURN: 0
ARTHRALGIAS: 1
HEMATURIA: 0
COUGH: 1
SORE THROAT: 1
CHILLS: 1
CONSTIPATION: 0
DIARRHEA: 1
FEVER: 0
PALPITATIONS: 1
HEMATOCHEZIA: 0
DIZZINESS: 1

## 2023-04-13 ASSESSMENT — PAIN SCALES - GENERAL: PAINLEVEL: MODERATE PAIN (4)

## 2023-04-13 NOTE — PROGRESS NOTES
SUBJECTIVE:   CC: Adelita is an 32 year old who presents for preventive health visit.       4/13/2023     9:50 AM   Additional Questions   Roomed by Rashida DANIEL LPN     Patient has been advised of split billing requirements and indicates understanding: Yes  Healthy Habits:     Getting at least 3 servings of Calcium per day:  NO    Bi-annual eye exam:  Yes    Dental care twice a year:  NO    Sleep apnea or symptoms of sleep apnea:  Daytime drowsiness    Diet:  Regular (no restrictions)    Frequency of exercise:  None    Taking medications regularly:  Not Applicable    Medication side effects:  Lightheadedness    PHQ-2 Total Score: 2    Additional concerns today:  No    Patient with several concerns today.    Tremor- vibrations in body.  Neuro said it is not neurologic.  She has changed her RA meds last fall so she is wondering if this could be the cause.  Cimzia may be the culprit.  May take two months for it all to resolve.  Today states she is still having the vibration sensation.    Swallowing- doing therapy, has sjogren's.  Endoscopy was postponed due to possible tremors then postponed again due to URI.  It should be next week now.    Mood- Not taking zoloft.  Was prescribed this by Neurologist.  Denies anxiety.    Dry mouth- has sjogrens, taking plaquenil.  Rheum told her that it was imbalanced hormones.    Weight loss- not trying to lose- has not changed diet  Wt Readings from Last 10 Encounters:   04/13/23 52.8 kg (116 lb 8 oz)   03/16/23 55.1 kg (121 lb 6.4 oz)   02/16/23 56.3 kg (124 lb 3.2 oz)   01/19/23 59 kg (130 lb)   01/15/23 60.3 kg (133 lb)   02/22/22 64.5 kg (142 lb 3.2 oz)   04/15/19 56.7 kg (125 lb)         Today's PHQ-2 Score:       4/13/2023     9:42 AM   PHQ-2 ( 1999 [employer])   Q1: Little interest or pleasure in doing things 1   Q2: Feeling down, depressed or hopeless 1   PHQ-2 Score 2   Q1: Little interest or pleasure in doing things Several days   Q2: Feeling down, depressed or hopeless Several  days   PHQ-2 Score 2       Have you ever done Advance Care Planning? (For example, a Health Directive, POLST, or a discussion with a medical provider or your loved ones about your wishes): No, advance care planning information given to patient to review.  Patient declined advance care planning discussion at this time.    Social History     Tobacco Use     Smoking status: Never     Smokeless tobacco: Not on file   Vaping Use     Vaping status: Not on file   Substance Use Topics     Alcohol use: Yes     Comment: rare             4/13/2023     9:42 AM   Alcohol Use   Prescreen: >3 drinks/day or >7 drinks/week? Not Applicable     Reviewed orders with patient.  Reviewed health maintenance and updated orders accordingly - Yes  Lab work is in process  Labs reviewed in EPIC    Breast Cancer Screening:    FHS-7:       4/13/2023     9:43 AM   Breast CA Risk Assessment (FHS-7)   Did any of your first-degree relatives have breast or ovarian cancer? Yes   Did any of your relatives have bilateral breast cancer? No   Did any man in your family have breast cancer? No   Did any woman in your family have breast and ovarian cancer? Yes   Did any woman in your family have breast cancer before age 50 y? Yes   Do you have 2 or more relatives with breast and/or ovarian cancer? Yes   Do you have 2 or more relatives with breast and/or bowel cancer? Unknown     click delete button to remove this line now  Patient under 40 years of age: Routine Mammogram Screening not recommended.   Pertinent mammograms are reviewed under the imaging tab.    History of abnormal Pap smear: NO - age 30-65 PAP every 5 years with negative HPV co-testing recommended     Reviewed and updated as needed this visit by clinical staff   Tobacco  Allergies  Meds              Reviewed and updated as needed this visit by Provider                     Review of Systems   Constitutional: Positive for chills. Negative for fever.   HENT: Positive for congestion, hearing loss  and sore throat. Negative for ear pain.    Eyes: Negative for pain and visual disturbance.   Respiratory: Positive for cough and shortness of breath.    Cardiovascular: Positive for chest pain and palpitations. Negative for peripheral edema.   Gastrointestinal: Positive for diarrhea. Negative for abdominal pain, constipation, heartburn, hematochezia and nausea.   Genitourinary: Positive for frequency and urgency. Negative for dysuria, genital sores and hematuria.   Musculoskeletal: Positive for arthralgias and joint swelling. Negative for myalgias.   Skin: Negative for rash.   Neurological: Positive for dizziness, weakness and headaches. Negative for paresthesias.   Psychiatric/Behavioral: Positive for mood changes. The patient is nervous/anxious.           OBJECTIVE:   BP 94/58   Pulse 87   Temp 98.4  F (36.9  C) (Oral)   Resp 15   Ht 1.524 m (5')   Wt 52.8 kg (116 lb 8 oz)   LMP 04/10/2023   SpO2 98%   BMI 22.75 kg/m    Physical Exam  GENERAL: healthy, alert and no distress  EYES: Eyes grossly normal to inspection, PERRL and conjunctivae and sclerae normal  HENT: ear canals and TM's normal, nose and mouth without ulcers or lesions  NECK: no adenopathy, no asymmetry, masses, or scars and thyroid normal to palpation  RESP: lungs clear to auscultation - no rales, rhonchi or wheezes  CV: regular rate and rhythm, normal S1 S2, no S3 or S4, no murmur, click or rub, no peripheral edema and peripheral pulses strong  ABDOMEN: soft, nontender, no hepatosplenomegaly, no masses and bowel sounds normal  MS: no gross musculoskeletal defects noted, no edema  SKIN: no suspicious lesions or rashes  NEURO: Normal strength and tone, mentation intact and speech normal  PSYCH: mentation appears normal, affect normal/bright, does appear somewhat anxious    Diagnostic Test Results:  Labs reviewed in Epic    ASSESSMENT/PLAN:   (Z00.00) Routine general medical examination at a health care facility  (primary encounter  diagnosis)  Comment:   Plan: Comprehensive metabolic panel (BMP + Alb, Alk         Phos, ALT, AST, Total. Bili, TP), CBC with         platelets        Will check labs today    (R63.4) Loss of weight  Comment:   Plan: TSH, T4, free, T3, Free, Thyroid peroxidase         antibody, Comprehensive metabolic panel (BMP +         Alb, Alk Phos, ALT, AST, Total. Bili, TP), ESR:        Erythrocyte sedimentation rate, HIV Antigen         Antibody Combo, Hepatitis C Screen Reflex to         HCV RNA Quant and Genotype, CBC with platelets        Follow up pending lab results    (R25.1) Tremor  Comment:   Plan: TSH, T4, free, T3, Free, Thyroid peroxidase         antibody, ESR: Erythrocyte sedimentation rate,         sertraline (ZOLOFT) 25 MG tablet        The feeling she is having is described as a feeling of vibration in her body- no visible tremor.  Neuro has assured her that it is not neurologic.    (M05.79) Rheumatoid arthritis involving multiple sites with positive rheumatoid factor (H)  (M35.01) Sjogren's syndrome with keratoconjunctivitis sicca (H)  Comment:   Plan: Adult Rheumatology  Referral        She is interested in seeing someone without our system rather than at [facility] where she has been going.  Referral given.          COUNSELING:  Reviewed preventive health counseling, as reflected in patient instructions       Regular exercise       Healthy diet/nutrition        She reports that she has never smoked. She does not have any smokeless tobacco history on file.      Elie Adam PA-C  [facility]

## 2023-04-13 NOTE — PATIENT INSTRUCTIONS
Preventive Health Recommendations  Female Ages 26 - 39  Yearly exam:   See your health care provider every year in order to  Review health changes.   Discuss preventive care.    Review your medicines if you your doctor has prescribed any.    Until age 30: Get a Pap test every three years (more often if you have had an abnormal result).    After age 30: Talk to your doctor about whether you should have a Pap test every 3 years or have a Pap test with HPV screening every 5 years.   You do not need a Pap test if your uterus was removed (hysterectomy) and you have not had cancer.  You should be tested each year for STDs (sexually transmitted diseases), if you're at risk.   Talk to your provider about how often to have your cholesterol checked.  If you are at risk for diabetes, you should have a diabetes test (fasting glucose).  Shots: Get a flu shot each year. Get a tetanus shot every 10 years.   Nutrition:   Eat at least 5 servings of fruits and vegetables each day.  Eat whole-grain bread, whole-wheat pasta and brown rice instead of white grains and rice.  Get adequate Calcium and Vitamin D.     Lifestyle  Exercise at least 150 minutes a week (30 minutes a day, 5 days of the week). This will help you control your weight and prevent disease.  Limit alcohol to one drink per day.  No smoking.   Wear sunscreen to prevent skin cancer.  See your dentist every six months for an exam and cleaning.        [facility]  [email or web address]  [address], [place] [address]    [phone]

## 2023-04-14 ENCOUNTER — TRANSFERRED RECORDS (OUTPATIENT)
Dept: HEALTH INFORMATION MANAGEMENT | Facility: CLINIC | Age: 33
End: 2023-04-14
Payer: MEDICAID

## 2023-04-14 ENCOUNTER — MYC MEDICAL ADVICE (OUTPATIENT)
Dept: FAMILY MEDICINE | Facility: CLINIC | Age: 33
End: 2023-04-14
Payer: MEDICAID

## 2023-04-14 LAB — THYROPEROXIDASE AB SERPL-ACNC: <10 IU/ML

## 2023-04-14 NOTE — TELEPHONE ENCOUNTER
Routed to Elie Adam, please review  message and advise.    Mili Hanson RN, BSN  [facility]

## 2023-04-18 ENCOUNTER — TRANSFERRED RECORDS (OUTPATIENT)
Dept: HEALTH INFORMATION MANAGEMENT | Facility: CLINIC | Age: 33
End: 2023-04-18
Payer: MEDICAID

## 2023-05-04 ENCOUNTER — OFFICE VISIT (OUTPATIENT)
Dept: SLEEP MEDICINE | Facility: CLINIC | Age: 33
End: 2023-05-04
Payer: MEDICAID

## 2023-05-04 ENCOUNTER — TRANSFERRED RECORDS (OUTPATIENT)
Dept: HEALTH INFORMATION MANAGEMENT | Facility: CLINIC | Age: 33
End: 2023-05-04

## 2023-05-04 VITALS
BODY MASS INDEX: 22.38 KG/M2 | HEIGHT: 60 IN | WEIGHT: 114 LBS | DIASTOLIC BLOOD PRESSURE: 69 MMHG | SYSTOLIC BLOOD PRESSURE: 102 MMHG | OXYGEN SATURATION: 100 % | HEART RATE: 75 BPM

## 2023-05-04 DIAGNOSIS — Z86.73 CHRONIC CEREBROVASCULAR ACCIDENT: ICD-10-CM

## 2023-05-04 DIAGNOSIS — R29.818 SUSPECTED SLEEP APNEA: Primary | ICD-10-CM

## 2023-05-04 DIAGNOSIS — R06.83 SNORING: ICD-10-CM

## 2023-05-04 PROCEDURE — 99204 OFFICE O/P NEW MOD 45 MIN: CPT | Performed by: INTERNAL MEDICINE

## 2023-05-04 RX ORDER — ZOLPIDEM TARTRATE 5 MG/1
TABLET ORAL
Qty: 1 TABLET | Refills: 0 | Status: SHIPPED | OUTPATIENT
Start: 2023-05-04 | End: 2023-07-17

## 2023-05-04 ASSESSMENT — SLEEP AND FATIGUE QUESTIONNAIRES
HOW LIKELY ARE YOU TO NOD OFF OR FALL ASLEEP WHEN YOU ARE A PASSENGER IN A CAR FOR AN HOUR WITHOUT A BREAK: SLIGHT CHANCE OF DOZING
HOW LIKELY ARE YOU TO NOD OFF OR FALL ASLEEP WHILE WATCHING TV: SLIGHT CHANCE OF DOZING
HOW LIKELY ARE YOU TO NOD OFF OR FALL ASLEEP IN A CAR, WHILE STOPPED FOR A FEW MINUTES IN TRAFFIC: WOULD NEVER DOZE
HOW LIKELY ARE YOU TO NOD OFF OR FALL ASLEEP WHILE SITTING INACTIVE IN A PUBLIC PLACE: WOULD NEVER DOZE
HOW LIKELY ARE YOU TO NOD OFF OR FALL ASLEEP WHILE SITTING QUIETLY AFTER LUNCH WITHOUT ALCOHOL: WOULD NEVER DOZE
HOW LIKELY ARE YOU TO NOD OFF OR FALL ASLEEP WHILE SITTING AND TALKING TO SOMEONE: WOULD NEVER DOZE
HOW LIKELY ARE YOU TO NOD OFF OR FALL ASLEEP WHILE LYING DOWN TO REST IN THE AFTERNOON WHEN CIRCUMSTANCES PERMIT: SLIGHT CHANCE OF DOZING
HOW LIKELY ARE YOU TO NOD OFF OR FALL ASLEEP WHILE SITTING AND READING: WOULD NEVER DOZE

## 2023-05-04 NOTE — NURSING NOTE
Chief Complaint   Patient presents with     Sleep Problem     Wake gasping for air, Small stroke, Body has tremors - all the time       Initial /69   Pulse 75   Ht 1.524 m (5')   Wt 51.7 kg (114 lb)   LMP 04/10/2023   SpO2 100%   BMI 22.26 kg/m   Estimated body mass index is 22.26 kg/m  as calculated from the following:    Height as of this encounter: 1.524 m (5').    Weight as of this encounter: 51.7 kg (114 lb).    Medication Reconciliation: complete  ESS   Neck circumference:34 centimeters.  Harleen Wong MA

## 2023-05-04 NOTE — PROGRESS NOTES
Sleep Consultation:    Date on this visit: 5/4/2023    Adelita Bosch  is referred by Kacey Sullivan for a sleep consultation.     Primary Physician: No Ref-Primary, Physician     Adelita Bosch is a 32 years old female with medical history significant for TIA, Rheumatoid arthritis and Sjogren's syndrome, who was sent for a sleep evaluation by her neurologist.    Patient was noted to have chronic lacunar infarct left cerebellum on CT scan, after she presented to the ER with complaints of tremor and paresthesias.    Adelita does snore some nights. Patient has sometimes experienced gasping episodes in sleep. She does not have witnessed apneas. She and her  never sleep separately.  Patient sleeps on her back and side. She has frequent morning dry mouth, denies no morning headaches and restless legs.     Patient reports having episodes of insomnia in the past.  Currently, she does not have significant difficulty.  In the last few days, she has woken up by 4 or 5 AM and has struggle to return to sleep.    Adelita goes to sleep at 10:30 PM during the week. Patient does use electronics in bed and read in bed.   She wakes up at 8:00 AM. She falls asleep in 30-60 minutes.  Adelita has difficulty falling asleep.  She wakes up 2-3 times a night for 10 minutes before falling back to sleep.  Adelita wakes up to go to the bathroom.  On weekends, Adelita goes to sleep at 12:00 AM.  She wakes up at 9:00 AM. She falls asleep in 30 minutes.  Patient gets an average of 7 hours of sleep per night.     Adelita denies any sleep walking, dream enactment, sleep paralysis and hypnogogic/hypnopompic hallucinations. She reports one incident where she flailed her arms and struck her .     Adelita denies difficulty breathing through her nose.      Patient's [place] Sleepiness score 3/24 consistent with no daytime sleepiness.      Adelita naps 0 times per week. She takes no inadvertant naps.  She denies closing eyes, dozing and falling  asleep while driving. Patient was counseled on the importance of driving while alert, to pull over if drowsy, or nap before getting into the vehicle if sleepy.      She uses no caffeine.    Past Medical/Surgical History:  Past Medical History:   Diagnosis Date     Rheumatoid arthritis (H)      No past surgical history on file.    Social History     Tobacco Use     Smoking status: Never   Vaping Use     Vaping status: Never Used   Substance Use Topics     Alcohol use: Yes     Comment: rare     Drug use: Never       Physical Examination:  Vitals: /69   Pulse 75   Ht 1.524 m (5')   Wt 51.7 kg (114 lb)   LMP 04/10/2023   SpO2 100%   BMI 22.26 kg/m    BMI= Body mass index is 22.26 kg/m .  GENERAL APPEARANCE: healthy, alert and no distress  HENT: oropharynx crowded  NEURO: mentation intact and speech normal  PSYCH: mentation appears normal and affect normal/bright  Mallampati Class: III.  Tonsillar Stage: 1  hidden by pillars.    Impression/Plan:    1.  To rule out obstructive sleep apnea  2.  History of CVA    Patient is a 32 years old female, with a BMI of 22, with history of chronic cerebellar infarct, who is sent for a sleep evaluation by her neurologist.  Patient is noted to have Mallampati class III airway on examination.  She does have a history of intermittent snoring and occasional gasping episodes in sleep.  Sleep quality is reported to be poor and she is tired during the day.  Considering her medical comorbidity, I recommended performing a PSG to assess if there is clinically significant sleep disordered breathing.    Plan:     1. PSG for assessment of sleep apnea       She will follow up with me in approximately two weeks after her sleep study has been competed to review the results and discuss plan of care.       Polysomnography reviewed.  Obstructive sleep apnea reviewed.  Complications of untreated sleep apnea were reviewed.    I spent a total of 45 minutes for this appointment on this date of  service which include time spent before, during and after the visit for chart review, patient care, counseling and coordination of care.    Dr. Pedro Lazcano       CC: Kacey Sullivan

## 2023-05-15 ENCOUNTER — OFFICE VISIT (OUTPATIENT)
Dept: FAMILY MEDICINE | Facility: CLINIC | Age: 33
End: 2023-05-15
Attending: PHYSICIAN ASSISTANT
Payer: MEDICAID

## 2023-05-15 VITALS
TEMPERATURE: 98.2 F | OXYGEN SATURATION: 100 % | HEIGHT: 60 IN | RESPIRATION RATE: 16 BRPM | HEART RATE: 98 BPM | WEIGHT: 116.3 LBS | SYSTOLIC BLOOD PRESSURE: 100 MMHG | BODY MASS INDEX: 22.83 KG/M2 | DIASTOLIC BLOOD PRESSURE: 72 MMHG

## 2023-05-15 DIAGNOSIS — R25.1 TREMOR: Primary | ICD-10-CM

## 2023-05-15 DIAGNOSIS — R13.10 DYSPHAGIA, UNSPECIFIED TYPE: ICD-10-CM

## 2023-05-15 DIAGNOSIS — I63.9 CEREBROVASCULAR ACCIDENT (CVA), UNSPECIFIED MECHANISM (H): ICD-10-CM

## 2023-05-15 DIAGNOSIS — F41.9 ANXIETY: ICD-10-CM

## 2023-05-15 PROCEDURE — 99214 OFFICE O/P EST MOD 30 MIN: CPT | Performed by: PHYSICIAN ASSISTANT

## 2023-05-15 RX ORDER — PROPRANOLOL HYDROCHLORIDE 20 MG/1
20 TABLET ORAL 3 TIMES DAILY
Qty: 90 TABLET | Refills: 0 | Status: SHIPPED | OUTPATIENT
Start: 2023-05-15 | End: 2024-01-29

## 2023-05-15 RX ORDER — SERTRALINE HYDROCHLORIDE 25 MG/1
25 TABLET, FILM COATED ORAL DAILY
Qty: 90 TABLET | Refills: 1 | Status: SHIPPED | OUTPATIENT
Start: 2023-05-15 | End: 2023-07-17

## 2023-05-15 ASSESSMENT — PATIENT HEALTH QUESTIONNAIRE - PHQ9
5. POOR APPETITE OR OVEREATING: SEVERAL DAYS
SUM OF ALL RESPONSES TO PHQ QUESTIONS 1-9: 5

## 2023-05-15 ASSESSMENT — ANXIETY QUESTIONNAIRES
2. NOT BEING ABLE TO STOP OR CONTROL WORRYING: SEVERAL DAYS
3. WORRYING TOO MUCH ABOUT DIFFERENT THINGS: SEVERAL DAYS
5. BEING SO RESTLESS THAT IT IS HARD TO SIT STILL: NOT AT ALL
GAD7 TOTAL SCORE: 5
GAD7 TOTAL SCORE: 5
IF YOU CHECKED OFF ANY PROBLEMS ON THIS QUESTIONNAIRE, HOW DIFFICULT HAVE THESE PROBLEMS MADE IT FOR YOU TO DO YOUR WORK, TAKE CARE OF THINGS AT HOME, OR GET ALONG WITH OTHER PEOPLE: SOMEWHAT DIFFICULT
6. BECOMING EASILY ANNOYED OR IRRITABLE: NOT AT ALL
7. FEELING AFRAID AS IF SOMETHING AWFUL MIGHT HAPPEN: SEVERAL DAYS
1. FEELING NERVOUS, ANXIOUS, OR ON EDGE: SEVERAL DAYS

## 2023-05-15 ASSESSMENT — PAIN SCALES - GENERAL: PAINLEVEL: NO PAIN (0)

## 2023-05-15 NOTE — NURSING NOTE
"Chief Complaint   Patient presents with     Recheck Medication     MOOD CHANGES     anxiety     Tremors     Initial /72 (BP Location: Right arm, Patient Position: Sitting, Cuff Size: Adult Regular)   Pulse 98   Temp 98.2  F (36.8  C) (Oral)   Resp 16   Ht 1.53 m (5' 0.25\")   Wt 52.8 kg (116 lb 4.8 oz)   LMP 05/11/2023   SpO2 100%   BMI 22.53 kg/m   Estimated body mass index is 22.53 kg/m  as calculated from the following:    Height as of this encounter: 1.53 m (5' 0.25\").    Weight as of this encounter: 52.8 kg (116 lb 4.8 oz).  BP completed using cuff size regular right arm    Lisa Magill, CMA    "

## 2023-05-15 NOTE — PROGRESS NOTES
"  Assessment & Plan     Tremor  Patient with ongoing sensation of \"vibration\" that can perhaps be described also as \"jittery\" or \"tremor\".  She is quite worried about why it is there, what it is, how to treat it.  Zoloft helped per patient with anxiety but she still is displaying anxiousness about health.  Continue zoloft- patient agreeable to this.  Trial of propranolol TID prn.  Would recommend ongoing and further discussion with Neurology as well.  - sertraline (ZOLOFT) 25 MG tablet; Take 1 tablet (25 mg) by mouth daily  - propranolol (INDERAL) 20 MG tablet; Take 1 tablet (20 mg) by mouth 3 times daily    Anxiety  Continue zoloft as well.  Trial of propranolol.  I do think anxiety is contributing to her symptoms but they remain present after low dose zoloft.  Patient did not want to increase her dose but agrees to continue.  Follow up 3-6 months.  - propranolol (INDERAL) 20 MG tablet; Take 1 tablet (20 mg) by mouth 3 times daily    Cerebrovascular accident (CVA), unspecified mechanism (H)  Following with Neurology    Dysphagia, unspecified type  Had recent swallow study and endoscopy (normal results)  I don't see a report for the swallow study right now.  She will have this sent to us.        SHAY Harvey [facility] MELISSA Ureña is a 32 year old, presenting for the following health issues:  Recheck Medication, MOOD CHANGES (anxiety), and Tremors        5/15/2023     9:26 AM   Additional Questions   Roomed by Lisa Magill, CMA   Accompanied by self         5/15/2023     9:26 AM   Patient Reported Additional Medications   Patient reports taking the following new medications NONE     HPI     Abnormal Mood Symptoms  Onset/Duration: ongoing   Description: anxiety  Depression (if yes, do PHQ-9): No  Anxiety (if yes, do SRAVANI-7): YES  Accompanying Signs & Symptoms:  Still participating in activities that you used to enjoy: No  Fatigue: YES  Irritability: No  Difficulty " "concentrating: No  Changes in appetite: YES- trying to eat more   Problems with sleep: YES- sometimes   Heart racing/beating fast: YES- sometimes/feels like needle pinches in chest area   Abnormally elevated, expansive, or irritable mood: No  Persistently increased activity or energy: No  Thoughts of hurting yourself or others: No  History:  Recent stress or major life event: No  Prior depression or anxiety: anxiety  Family history of depression or anxiety: No  Alcohol/drug use: No  Difficulty sleeping: YES  Precipitating or alleviating factors: zoloft not helping.   Therapies tried and outcome: none        View : No data to display.                   View : No data to display.              Concern - ongoing tremors/vibration in body all of the time   Onset: ongoing   Description: vibration feeling constantly   Intensity: severe  Progression of Symptoms:  same and constant  Accompanying Signs & Symptoms: NONE  Previous history of similar problem: YES   Precipitating factors:        Worsened by: when patient eats sweet food   Alleviating factors:        Improved by: trying to cope with the symptoms   Therapies tried and outcome: zoloft-not helping.       Adelita is here for follow up medication check today.  Patient taking zoloft 25mg daily for a month now and notes that she feels that she worries less since starting it.    She does mention still feeling \"vibrations\" in her whole body however. The zoloft has not taken that feeling away at this point.  This is really worrying her.  Daily occurrence.  Happens at rest (mostly at night) and not with movements.  Does not think what she is feeling is NOT anxiety- she has many questions and concerns today regarding this feeling.  States that her sister could feel the tremor when touching her arms/hands and calves.  Has been seeing Neurology as well for hx of CVA- follow up in July  She is very concerned that she is having nerve damage, asking if she has MS  Not working out " "anymore due to symptoms but did get a job, starting next week, as     She is seeing rheumatology next week.  Off injections x 2 months without change in tremor sensation.    Had endoscopy last month and another swallow study as well in early May  Feeling still voice is off.  Was having trouble getting in to speech therapy appointments previously so is not doing that right now.        Review of Systems   Constitutional, HEENT, cardiovascular, pulmonary, gi and gu systems are negative, except as otherwise noted.      Objective    /72 (BP Location: Right arm, Patient Position: Sitting, Cuff Size: Adult Regular)   Pulse 98   Temp 98.2  F (36.8  C) (Oral)   Resp 16   Ht 1.53 m (5' 0.25\")   Wt 52.8 kg (116 lb 4.8 oz)   LMP 05/11/2023   SpO2 100%   BMI 22.53 kg/m    Body mass index is 22.53 kg/m .  Physical Exam   GENERAL: healthy, alert and no distress  MS: no gross musculoskeletal defects noted, no edema  SKIN: no suspicious lesions or rashes  NEURO: Normal strength and tone, fasciculations - none, tremor - none seen or felt on exam today, sensory exam grossly normal, mentation intact and cranial nerves 2-12 intact  PSYCH: mentation appears normal, affect normal/bright and also anxious                    "

## 2023-07-14 PROBLEM — I66.3: Status: ACTIVE | Noted: 2023-07-14

## 2023-07-14 PROBLEM — Z86.73 HISTORY OF CEREBELLAR STROKE: Status: ACTIVE | Noted: 2023-02-16

## 2023-07-15 NOTE — PROGRESS NOTES
NEUROLOGY CONSULTATION NOTE       [facility] [place]  [address] [place] [address]  Tel: [phone]  Fax: [phone]  [email or web address]     Adelita Bosch,  1990, MRN [id number]  PCP: No Ref-Primary, Physician  Date: 2023     ASSESSMENT & PLAN     Visit Diagnosis  Thrombosis of posterior inferior cerebellar artery  Left Cerebellar infarction  Generalized anxiety disorder     H/O left cerebellar infarction  32-year-old female with history of diffuse connective tissue disease, PPD positive, Sjogren syndrome, rheumatoid arthritis who was noted to have incidental left cerebellar infarct with left PICA occlusion.  She already had an extensive work-up including CTA, EEG, hypercoagulable work-up, echocardiogram and a 30-day event monitor that was normal.  I have encouraged her to continue taking baby aspirin.  Her lipid panel shows LDL of 93 and have encouraged her to watch her diet with a goal of LDL less than 70.  I would recommend repeating lipid profile in 6 months and if her LDL is still above 72 start her on a statin    Generalized anxiety disorder  Since diagnosis of left cerebellar infarct patient has multiple vague symptoms with internal shaking, tongue twitching and muscle spasm.  She clearly has generalized anxiety disorder and although previously routine EEG was normal, I have recommended:    1.  Repeat sleep-deprived EEG  2.  Increase Zoloft to 50 mg daily  3.  Follow-up in 6 months    Thank you again for this referral, please feel free to contact me if you have any questions.    Russell Gutierrez MD  [facility]  (Formerly, [facility]     REASON FOR CONSULTATION Stroke        HISTORY OF PRESENT ILLNESS     We have been requested  to evaluate Adelita Bosch who is a 32 year old  female for H/O CVA    Patient is a 32-year-old female with history of diffuse connective tissue disease, PPD  positive, Sjogren syndrome, latent tuberculosis, rheumatoid arthritis who was referred for evaluation of CVA.  She was seen in the emergency room for tremors and paresthesias and initially it was felt it was a side effect of Plaquenil.  Part of work-up included a CT scan that showed chronic left cerebellar lacunar infarct.  CTA showed a left PICA occlusion echocardiogram with bubble study showed no right-to-left shift EEG was normal.  She was continued on aspirin.  Hypercoagulable work-up included normal lupus anticoagulant, protein C chromogenic protein S antigen, factor II and factor V mutation, cardiolipin antibody, beta-2 glycoprotein, Antithrombin III antibody were normal.  Lipid panel showed an LDL of 93.    Since that episode patient has reported multiple vague symptoms that she described as shaking inside with numbness tingling in different parts of her body.  She was told she has anxiety and started on Zoloft that did seem to help.  She is worried about the possibility of recurrent stroke or multiple sclerosis.     PROBLEM LIST   Patient Active Problem List   Diagnosis Code    Breast mass, left N63.20    Breast mass, right N63.10    Diffuse connective tissue disease (H) M35.9    Dry eyes, bilateral H04.123    PPD positive R76.11    Rheumatoid arthritis involving multiple sites with positive rheumatoid factor (H) M05.79    Sjogren's syndrome with keratoconjunctivitis sicca (H) M35.01    SS-A antibody positive R76.8    TB lung, latent Z22.7    H/O Lt cerebellar stroke Z86.73    Tremor R25.1    Lt. PICA occlusion I66.3         PAST MEDICAL & SURGICAL HISTORY     Past Medical History:   Patient  has a past medical history of Rheumatoid arthritis (H).    Surgical History:  She  has no past surgical history on file.     SOCIAL HISTORY     Reviewed, and she  reports that she has never smoked. She does not have any smokeless tobacco history on file. She reports current alcohol use. She reports that she does not use  "drugs.     FAMILY HISTORY     Reviewed, and family history is not on file.     ALLERGIES     No Known Allergies      REVIEW OF SYSTEMS     A 12 point review of system was performed and was negative except as outlined in the history of present illness.     HOME MEDICATIONS     Current Outpatient Rx   Medication Sig Dispense Refill    albuterol (PROAIR HFA/PROVENTIL HFA/VENTOLIN HFA) 108 (90 Base) MCG/ACT inhaler Inhale 2 puffs into the lungs every 6 hours as needed for shortness of breath, wheezing or cough      aspirin (ASA) 81 MG EC tablet Take 1 tablet (81 mg) by mouth daily      CALCIUM PO Take 1 tablet by mouth daily      Cyanocobalamin (VITAMIN B-12 PO) Take 1 tablet by mouth daily      folic acid (FOLVITE) 1 MG tablet       hydroxychloroquine (PLAQUENIL) 200 MG tablet Take 300 mg by mouth daily      MAGNESIUM PO Take 1 tablet by mouth daily      POTASSIUM PO Take 1 tablet by mouth daily      predniSONE (DELTASONE) 2.5 MG tablet Take 2.5 mg by mouth daily For RA. When feeling a flare up, take 2 tabs (5mg) daily.      propranolol (INDERAL) 20 MG tablet Take 1 tablet (20 mg) by mouth 3 times daily 90 tablet 0    sertraline (ZOLOFT) 50 MG tablet Take 1 tablet (50 mg) by mouth daily 30 tablet 11    VITAMIN A PO Take 1 tablet by mouth daily      VITAMIN D PO Take 1 tablet by mouth daily           PHYSICAL EXAM     Vital signs  BP (!) 89/55   Pulse 86   Ht (!) 0.127 m (5\")   Wt 52.6 kg (116 lb)   BMI 3262.28 kg/m      Weight:   116 lbs 0 oz    Patient is alert and oriented x4 in no acute distress. Vital signs were reviewed and are documented in electronic medical record. Neck was supple, no carotid bruits, thyromegaly, JVD, or lymphadenopathy was noted.   NEUROLOGY EXAM:   Patient s speech was normal with no aphasia or dysarthria. Mentation, and affect were also normal.    Funduscopic exam was normal, with normal cup to disc ratio. Cranial nerves II -XII were intact.    Patient had normal mass, tone and motor " strength was 5/5 in all extremities without pronator drift.    Sensation was intact to light touch, pinprick, and vibratory sensation.    Reflexes were 1+ symmetrical with downgoing toes.    No dysmetria noted on FNF or HKS. Romberg was negative.   Gait testing was normal. Able to walk on toes/heels. Tandem walk normal.     PERTINENT DIAGNOSTIC STUDIES     Following studies were reviewed:     CTA HEAD AND NECK 2/12/2023  1. No high-grade stenosis or large vessel occlusion involving the  major intracranial arteries.  2. Probable variant arterial supply of the left PICA territory, as  described.  3. Patent cervical carotid and vertebral arteries without stenosis or  dissection.  4. No intracranial aneurysm or high-flow vascular malformation.    MRI BRAIN 2/1/2023  HEAD MRI:  1.  No acute ischemia, mass/mass effect, or abnormal intracranial enhancement.   2.  Chronic left inferior cerebellar infarct.     HEAD MRA:  1.  No proximal cerebral artery branch vessel occlusion, aneurysm, or vascular malformation.   2.  No flow related enhancement corresponding with the left PICA. While this can be seen due to diminutive vessel caliber/hypoplasia, the presence of chronic ipsilateral PICA territory infarct may indicate a component of underlying chronic flow   compromise.     NECK MRA:  1.  No flow-limiting stenosis or findings of dissection.    EEG 1/30/2023  This is a normal awake EEG. Please note that the absence of epileptiform abnormalities on EEG does not rule out the possibility of seizures.     ECHOCARDIOGRAM 2/3/2023  A contrast injection (Bubble Study) was performed that was negative for flow  across the interatrial septum.  Normal echocardiogram.  Compared to the prior study dated 2/2/2023, there have been no changes.    30-DAY EVENT MONITOR 2/3/2023  No significant arrhythmia     PERTINENT LABS  Following labs were reviewed:  No visits with results within 3 Month(s) from this visit.   Latest known visit with results  is:   Office Visit on 04/13/2023   Component Date Value Ref Range Status    TSH 04/13/2023 2.44  0.30 - 4.20 uIU/mL Final    Free T4 04/13/2023 1.38  0.90 - 1.70 ng/dL Final    T3 Free 04/13/2023 3.0  2.0 - 4.4 pg/mL Final    Thyroid Peroxidase Antibody 04/13/2023 <10  <35 IU/mL Final    Sodium 04/13/2023 139  136 - 145 mmol/L Final    Potassium 04/13/2023 4.0  3.4 - 5.3 mmol/L Final    Chloride 04/13/2023 104  98 - 107 mmol/L Final    Carbon Dioxide (CO2) 04/13/2023 19 (L)  22 - 29 mmol/L Final    Anion Gap 04/13/2023 16 (H)  7 - 15 mmol/L Final    Urea Nitrogen 04/13/2023 8.0  6.0 - 20.0 mg/dL Final    Creatinine 04/13/2023 0.69  0.51 - 0.95 mg/dL Final    Calcium 04/13/2023 9.3  8.6 - 10.0 mg/dL Final    Glucose 04/13/2023 91  70 - 99 mg/dL Final    Alkaline Phosphatase 04/13/2023 55  35 - 104 U/L Final    AST 04/13/2023 21  10 - 35 U/L Final    ALT 04/13/2023 12  10 - 35 U/L Final    Protein Total 04/13/2023 7.7  6.4 - 8.3 g/dL Final    Albumin 04/13/2023 4.6  3.5 - 5.2 g/dL Final    Bilirubin Total 04/13/2023 0.7  <=1.2 mg/dL Final    GFR Estimate 04/13/2023 >90  >60 mL/min/1.73m2 Final    Erythrocyte Sedimentation Rate 04/13/2023 12  0 - 20 mm/hr Final    HIV Antigen Antibody Combo 04/13/2023 Nonreactive  Nonreactive Final    Hepatitis C Antibody 04/13/2023 Nonreactive  Nonreactive Final    WBC Count 04/13/2023 4.6  4.0 - 11.0 10e3/uL Final    RBC Count 04/13/2023 4.54  3.80 - 5.20 10e6/uL Final    Hemoglobin 04/13/2023 14.3  11.7 - 15.7 g/dL Final    Hematocrit 04/13/2023 41.5  35.0 - 47.0 % Final    MCV 04/13/2023 91  78 - 100 fL Final    MCH 04/13/2023 31.5  26.5 - 33.0 pg Final    MCHC 04/13/2023 34.5  31.5 - 36.5 g/dL Final    RDW 04/13/2023 12.3  10.0 - 15.0 % Final    Platelet Count 04/13/2023 184  150 - 450 10e3/uL Final        Total time spent for face to face visit, reviewing labs/imaging studies, counseling and coordination of care was: 1 Hour spent on the date of the encounter doing chart review,  review of outside records, review of test results, interpretation of tests, patient visit, and documentation     This note was dictated using voice recognition software.  Any grammatical or context distortions are unintentional and inherent to the software.    Orders Placed This Encounter   Procedures    Ceruloplasmin    Copper level    TSH with free T4 reflex    EEG Sleep Deprived      New Prescriptions    No medications on file      Modified Medications    Modified Medication Previous Medication    SERTRALINE (ZOLOFT) 50 MG TABLET sertraline (ZOLOFT) 25 MG tablet       Take 1 tablet (50 mg) by mouth daily    Take 1 tablet (25 mg) by mouth daily

## 2023-07-17 ENCOUNTER — OFFICE VISIT (OUTPATIENT)
Dept: NEUROLOGY | Facility: CLINIC | Age: 33
End: 2023-07-17
Payer: MEDICAID

## 2023-07-17 VITALS
WEIGHT: 123.1 LBS | HEART RATE: 86 BPM | BODY MASS INDEX: 24.17 KG/M2 | DIASTOLIC BLOOD PRESSURE: 55 MMHG | SYSTOLIC BLOOD PRESSURE: 89 MMHG | HEIGHT: 60 IN

## 2023-07-17 DIAGNOSIS — I66.3: Primary | ICD-10-CM

## 2023-07-17 DIAGNOSIS — F41.1 GAD (GENERALIZED ANXIETY DISORDER): ICD-10-CM

## 2023-07-17 DIAGNOSIS — R25.1 TREMOR: ICD-10-CM

## 2023-07-17 PROCEDURE — 99215 OFFICE O/P EST HI 40 MIN: CPT | Performed by: PSYCHIATRY & NEUROLOGY

## 2023-07-17 NOTE — NURSING NOTE
Chief Complaint   Patient presents with     Stroke     Follow up     Lisseth Ho on 7/17/2023 at 2:55 PM

## 2023-07-17 NOTE — Clinical Note
2023         RE: Adelita Bosch  [address]  [facility] [address]        Dear Colleague,    Thank you for referring your patient, Adelita Bosch, to the [facility]UROLOG[facility] [place]. Please see a copy of my visit note below.    NEUROLOGY CONSULTATION NOTE       [facility] NEUROLOGY [place]  [address] [place] [address]  Tel: [phone]  Fax: [phone]  [email or web address]     Adelita Bosch,  1990, MRN [id number]  PCP: No Ref-Primary, Physician  Date: 2023     ASSESSMENT & PLAN     Visit Diagnosis  Thrombosis of posterior inferior cerebellar artery  Left Cerebellar infarction     ***    Thank you again for this referral, please feel free to contact me if you have any questions.    Russell Gutierrez MD  [facility]  (Formerly, [facility]     REASON FOR CONSULTATION Stroke        HISTORY OF PRESENT ILLNESS     We have been requested  to evaluate Adelita Bosch who is a 32 year old  female for H/O CVA    Patient is a 32-year-old female with history of diffuse connective tissue disease, PPD positive, Sjogren syndrome, latent tuberculosis, rheumatoid arthritis who was referred for evaluation of CVA.  She was seen in the emergency room for tremors and paresthesias and initially it was felt it was a side effect of Plaquenil.  Part of work-up included a CT scan that showed chronic left cerebellar lacunar infarct.  CTA showed a left PICA occlusion echocardiogram with bubble study showed no right-to-left shift EEG was normal.  She was continued on aspirin.  Hypercoagulable work-up included normal lupus anticoagulant, protein C chromogenic protein S antigen, factor II and factor V mutation, cardiolipin antibody, beta-2 glycoprotein, Antithrombin III antibody were normal.  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    Surgical History:  She  has no past surgical history on file.     SOCIAL HISTORY     Reviewed, and she  reports that she has never smoked. She does not have any smokeless tobacco history on file. She reports current alcohol use. She reports that she does not use drugs.     FAMILY HISTORY     Reviewed, and family history is not on file.     ALLERGIES     No Known Allergies      REVIEW OF SYSTEMS     A 12 point review of system was performed and was negative except as outlined in the history of present illness.     HOME MEDICATIONS     Current Outpatient Rx   Medication Sig Dispense Refill    albuterol (PROAIR HFA/PROVENTIL HFA/VENTOLIN HFA) 108 (90 Base) MCG/ACT inhaler Inhale 2 puffs into the lungs every 6 hours as needed for shortness of breath, wheezing or cough      aspirin (ASA) 81 MG EC tablet Take 1 tablet (81 mg) by mouth daily      CALCIUM PO Take 1 tablet by mouth daily      Cyanocobalamin (VITAMIN B-12 PO) Take 1 tablet by mouth daily      folic acid (FOLVITE) 1 MG tablet       hydroxychloroquine (PLAQUENIL) 200 MG tablet Take 300 mg by mouth daily      MAGNESIUM PO Take 1 tablet by mouth daily      POTASSIUM PO Take 1 tablet by mouth daily      predniSONE (DELTASONE) 2.5 MG tablet Take 2.5 mg by mouth daily For RA. When feeling a flare up, take 2 tabs (5mg) daily.      propranolol (INDERAL) 20 MG tablet Take 1 tablet (20 mg) by mouth 3  "times daily 90 tablet 0    sertraline (ZOLOFT) 25 MG tablet Take 1 tablet (25 mg) by mouth daily 90 tablet 1    VITAMIN A PO Take 1 tablet by mouth daily      VITAMIN D PO Take 1 tablet by mouth daily           PHYSICAL EXAM     Vital signs  BP (!) 89/55   Pulse 86   Ht (!) 0.127 m (5\")   Wt 52.6 kg (116 lb)   BMI 3262.28 kg/m      Weight:   116 lbs 0 oz    ***     PERTINENT DIAGNOSTIC STUDIES     Following studies were reviewed:     CTA HEAD AND NECK 2/12/2023  1. No high-grade stenosis or large vessel occlusion involving the  major intracranial arteries.  2. Probable variant arterial supply of the left PICA territory, as  described.  3. Patent cervical carotid and vertebral arteries without stenosis or  dissection.  4. No intracranial aneurysm or high-flow vascular malformation.    MRI BRAIN 2/1/2023  HEAD MRI:  1.  No acute ischemia, mass/mass effect, or abnormal intracranial enhancement.   2.  Chronic left inferior cerebellar infarct.     HEAD MRA:  1.  No proximal cerebral artery branch vessel occlusion, aneurysm, or vascular malformation.   2.  No flow related enhancement corresponding with the left PICA. While this can be seen due to diminutive vessel caliber/hypoplasia, the presence of chronic ipsilateral PICA territory infarct may indicate a component of underlying chronic flow   compromise.     NECK MRA:  1.  No flow-limiting stenosis or findings of dissection.    EEG 1/30/2023  This is a normal awake EEG. Please note that the absence of epileptiform abnormalities on EEG does not rule out the possibility of seizures.     ECHOCARDIOGRAM 2/3/2023  A contrast injection (Bubble Study) was performed that was negative for flow  across the interatrial septum.  Normal echocardiogram.  Compared to the prior study dated 2/2/2023, there have been no changes.    30-DAY EVENT MONITOR 2/3/2023  No significant arrhythmia     PERTINENT LABS  Following labs were reviewed:  No visits with results within 3 Month(s) from " this visit.   Latest known visit with results is:   Office Visit on 04/13/2023   Component Date Value Ref Range Status    TSH 04/13/2023 2.44  0.30 - 4.20 uIU/mL Final    Free T4 04/13/2023 1.38  0.90 - 1.70 ng/dL Final    T3 Free 04/13/2023 3.0  2.0 - 4.4 pg/mL Final    Thyroid Peroxidase Antibody 04/13/2023 <10  <35 IU/mL Final    Sodium 04/13/2023 139  136 - 145 mmol/L Final    Potassium 04/13/2023 4.0  3.4 - 5.3 mmol/L Final    Chloride 04/13/2023 104  98 - 107 mmol/L Final    Carbon Dioxide (CO2) 04/13/2023 19 (L)  22 - 29 mmol/L Final    Anion Gap 04/13/2023 16 (H)  7 - 15 mmol/L Final    Urea Nitrogen 04/13/2023 8.0  6.0 - 20.0 mg/dL Final    Creatinine 04/13/2023 0.69  0.51 - 0.95 mg/dL Final    Calcium 04/13/2023 9.3  8.6 - 10.0 mg/dL Final    Glucose 04/13/2023 91  70 - 99 mg/dL Final    Alkaline Phosphatase 04/13/2023 55  35 - 104 U/L Final    AST 04/13/2023 21  10 - 35 U/L Final    ALT 04/13/2023 12  10 - 35 U/L Final    Protein Total 04/13/2023 7.7  6.4 - 8.3 g/dL Final    Albumin 04/13/2023 4.6  3.5 - 5.2 g/dL Final    Bilirubin Total 04/13/2023 0.7  <=1.2 mg/dL Final    GFR Estimate 04/13/2023 >90  >60 mL/min/1.73m2 Final    Erythrocyte Sedimentation Rate 04/13/2023 12  0 - 20 mm/hr Final    HIV Antigen Antibody Combo 04/13/2023 Nonreactive  Nonreactive Final    Hepatitis C Antibody 04/13/2023 Nonreactive  Nonreactive Final    WBC Count 04/13/2023 4.6  4.0 - 11.0 10e3/uL Final    RBC Count 04/13/2023 4.54  3.80 - 5.20 10e6/uL Final    Hemoglobin 04/13/2023 14.3  11.7 - 15.7 g/dL Final    Hematocrit 04/13/2023 41.5  35.0 - 47.0 % Final    MCV 04/13/2023 91  78 - 100 fL Final    MCH 04/13/2023 31.5  26.5 - 33.0 pg Final    MCHC 04/13/2023 34.5  31.5 - 36.5 g/dL Final    RDW 04/13/2023 12.3  10.0 - 15.0 % Final    Platelet Count 04/13/2023 184  150 - 450 10e3/uL Final        Total time spent for face to face visit, reviewing labs/imaging studies, counseling and coordination of care was: 1 Hour spent on  the date of the encounter doing chart review, review of outside records, review of test results, interpretation of tests, patient visit, and documentation     This note was dictated using voice recognition software.  Any grammatical or context distortions are unintentional and inherent to the software.    No orders of the defined types were placed in this encounter.     New Prescriptions    No medications on file      Modified Medications    No medications on file                Again, thank you for allowing me to participate in the care of your patient.        Sincerely,        Russell Gutierrez MD

## 2023-07-24 ENCOUNTER — ANCILLARY PROCEDURE (OUTPATIENT)
Dept: NEUROLOGY | Facility: CLINIC | Age: 33
End: 2023-07-24
Attending: PSYCHIATRY & NEUROLOGY
Payer: MEDICAID

## 2023-07-24 DIAGNOSIS — I66.3: ICD-10-CM

## 2023-07-24 DIAGNOSIS — R25.1 TREMOR: ICD-10-CM

## 2023-07-24 DIAGNOSIS — F41.1 GAD (GENERALIZED ANXIETY DISORDER): ICD-10-CM

## 2023-07-24 PROCEDURE — 95816 EEG AWAKE AND DROWSY: CPT | Performed by: PSYCHIATRY & NEUROLOGY

## 2023-08-07 NOTE — PROGRESS NOTES
[facility]        OUTPATIENT SPEECH LANGUAGE PATHOLOGY DYSPHAGIA DISCHARGE NOTE     NOTE CREATION: 08/07/23 03/20/23 2100   Appointment Info   Treating Provider Dania Gabriel   SLP Tx Diagnosis Mild oral-pharyngeal dysphagia, signs of esophageal dysphagia   Session Number   Session Number 2   Authorization status [place] [facility]   Progress Note/Certification   Onset Of Illness/injury Or Date Of Surgery 02/16/23  (date of order)   Progress Note Due Date 06/11/23   Recertification Due 06/11/23       Present No    ID English   Subjective Report   Subjective Report Pt arrived ten min late to appointment. Stated she recently seen neurologist in which stated that her nerve tingling is not d/t her stroke. Rather suspect medication changes. Did not complete endoscopy - however will complete soon.   Treatment Interventions (SLP)   Treatment Interventions Treatment Swallow/Oral dysfunction   Treatment Swallow/Oral dysfunction   Treatment of Swallowing Dysfunction &/or Oral Function for Feeding Minutes (24016) 35 Minutes   Skilled Intervention Educated patient on swallowing strategies.;Educated patient on risks of aspiration;Demonstrated safe swallow strategies;Cued swallowing strategies (auditory, visual, tactile)   Patient Response/Progress Pt benefits from SLP verbal education as well as SLP visual model and demonstration   Treatment Detail see details above   Progress progressing towards goals   Education   Learner/Method Patient   Education Comments Session targets aspiration risks, safe swallow strategies, oropharyngeal swallow strengthening exercises,  and progress, strategies, cueing hierarchy, provided education regarding how to augment HEP with materials in pt's take home folder   Plan   Home program daily   Updates to plan of care continue POC   Plan for next session target all  goals STG 1, STG 2, STG3 (train in CTAR CONSECUTIVE and HOLD). ask about endoscopy   Swallow Goal 1   Goal Identifier Strategies   Goal Description Patient will verbalize and/or demonstrate understanding of safe swallow strategies and pharyngeal esophageal clearance  HEP, and recommendations with 100% accuracy given min-no cues across 3 consecutive sessions.   Target Date 06/11/23   Goal Progress Skilled intervention addressed training through verbal education, visual model and demonstration of safe swallow strategies including: single/small sips/bites, slow rate of PO intake, alternate solids w/ liquids, upright and midline posutre as well as pharyngel esophageal clearance strategies including: smaller meals throughout days rather than 6 large ones, upright and midline posture during PO consumption and staying upright for 60 min after, reducing acidic food intake, slow rate of PO intake. Pt stated verbal comprehension   Swallow Goal 2   Goal Identifier PO tolerance   Goal Description Patient will tolerate easy to chew- solids and thin liquids with less than 2 daily events of  s/sx of aspiration per pt report/aspiration journal across three  consecutive sessions   Target Date 06/11/23   Goal Progress Pt continues to report intermittent throat clearing on more solid foods, and further stated she observed this was more at night then in the morning.   Swallow Goal 3   Goal Identifier Oropharyngeal exercises   Goal Description Patient will learn and  complete oropharyngeal exercises 2-3x daily given min assist, to increase strength and improve safety with PO intake   Target Date 06/11/23   Goal Progress Pt was trained in the following oropharyngeal swallow strengthening exercises to increase BOT ROM that was seen reduced on MBSS and promote upward movement of the larynx for further protection of airway. Trained in: yawning, BOT retraction, joselyn, gurgle, shaker, effortful, and mendelsohn on this date. Pt able to  replice 5/5 yawning w/ min cues or less, 5/5 BOT retraction with min cues or less, 2/5 gurgle w/ min-mod cues, 0/5 maskao depsite max cues, 5/5 effortful swallow w/ min-mod cues, and 3/5 mendelsohn w/ min-mod cues secondary to new learning abilities   OTHER   SLP Swallow Goals 1;2;3   General Information   Medical Diagnosis Dysphagia, unspecified type (R13.10)   Start Of Care Date 03/13/23   Therapy Certification   Certification date from 03/13/23         DISCHARGE  Reason for Discharge: Patient has failed to schedule further appointments.    Equipment Issued: N/A    Discharge Plan: Patient to continue home program.    Referring Provider:  Elie Adam     Thank you for referring Adelita Bosch to outpatient speech therapy at [facility].  Please call [phone] or email  Mika Gabriel MS, CCC-SLP at [email or web address] with any questions or concerns.           Mika Gabriel MS, CCC-SLP      Speech-Language Pathologist

## 2023-08-07 NOTE — ADDENDUM NOTE
Encounter addended by: Dania Gabriel, SLP on: 8/7/2023 3:50 PM   Actions taken: Clinical Note Signed, Flowsheet accepted, Episode edited, Episode resolved

## 2024-01-26 ENCOUNTER — TELEPHONE (OUTPATIENT)
Dept: FAMILY MEDICINE | Facility: CLINIC | Age: 34
End: 2024-01-26
Payer: MEDICAID

## 2024-01-26 ENCOUNTER — TELEPHONE (OUTPATIENT)
Dept: FAMILY MEDICINE | Facility: CLINIC | Age: 34
End: 2024-01-26

## 2024-01-26 NOTE — LETTER
[facility]  [address] [id number] [phone]       February 12, 2024    Adelita Bosch  [address] [facility] [address]    Dear Adelita,    We care about your health and have reviewed your health plan and are making recommendations based on this review, to optimize your health.    You are in particular need of attention regarding:  -Coronary Artery and/or Vascular Disease    We are recommending that you:  -schedule a FOLLOWUP OFFICE APPOINTMENT with me.       In addition, here is a list of due or overdue Health Maintenance reminders.    Health Maintenance Due   Topic Date Due    COVID-19 Vaccine (1) Never done    Flu Vaccine (1) 09/01/2023    PHQ-2 (once per calendar year)  01/01/2024    ANNUAL REVIEW OF HM ORDERS  02/16/2024    PAP Smear  03/16/2024       To address the above recommendations, we encourage you to contact us at [phone], via [employer] or by contacting Central Scheduling toll free at [phone] 24 hours a day. They will assist you with finding the most convenient time and location.    Thank you for trusting [facility] and we appreciate the opportunity to serve you.  We look forward to supporting your healthcare needs in the future.    Healthy Regards,    Your [facility] Team

## 2024-01-26 NOTE — LETTER
[facility]  [address] [address] [phone]       May 13, 2024    Adelita Bosch  [address] [place] [id number]    Dear Adelita,    We care about your health and have reviewed your health plan and are making recommendations based on this review, to optimize your health.    You are in particular need of attention regarding:  -Cervical Cancer Screening  -Wellness (Physical) Visit     We are recommending that you:  -schedule a WELLNESS (Physical) APPOINTMENT with me.   I will check fasting labs the same day - nothing to eat except water and meds for 8-10 hours prior.    -schedule a PAP SMEAR EXAM which is due.  Please disregard this reminder if you have had this exam elsewhere within the last year.  It would be helpful for us to have a copy of your recent pap smear report in our file so that we can best coordinate your care.    If you are under/uninsured, we recommend you contact the [facility]. They offer pap smears at no charge or on a sliding fee charge. You can schedule with them at [phone]. Please have them send us the results.    In addition, here is a list of due or overdue Health Maintenance reminders.    Health Maintenance Due   Topic Date Due    COVID-19 Vaccine (1 - 2023-24 season) Never done    PHQ-2 (once per calendar year)  01/01/2024    ANNUAL REVIEW OF HM ORDERS  02/16/2024    Yearly Preventive Visit  04/13/2024    PAP Smear  03/16/2024       To address the above recommendations, we encourage you to contact us at [phone], via [employer] or by contacting Central Scheduling toll free at [phone] 24 hours a day. They will assist you with finding the most convenient time and location.    Thank you for trusting [facility] and we appreciate the opportunity to serve you.  We look forward to supporting your healthcare needs in the future.    Healthy Regards,    Your [facility]  Team

## 2024-01-26 NOTE — CONFIDENTIAL NOTE
Patient Quality Outreach    Patient is due for the following:   IVD  -  IVD Follow-up Visit    Next Steps:   Schedule a office visit for IVD    Type of outreach:    Sent [employer] message.      Questions for provider review:    None           Yash Anne MA

## 2024-01-26 NOTE — PROGRESS NOTES
NEUROLOGY FOLLOW UP VISIT  NOTE       [facility]UR[facility] [place]  [address] [place] [address]  Tel: [phone]  Fax: [phone]  [email or web address]     Adelita Bosch,  1990, MRN [id number]  PCP: No Ref-Primary, Physician  Date: 2024      ASSESSMENT & PLAN     Visit Diagnosis  Thrombosis of posterior inferior cerebellar artery  SRAVANI (generalized anxiety disorder)     History of stroke  33-year-old female with diffuse connective tissue disease, PPD positive, Sjogren's syndrome, rheumatoid arthritis, latent tuberculosis, SRAVANI was evaluated for incidental left cerebellar infarction and left PICA occlusion.  Extensive lab work for hypercoagulable states was negative.  She was started on aspirin and a cholesterol-lowering medicine.  Her LDL was 93.  She had multiple vague symptoms likely due to underlying anxiety and benefited after she was started on Zoloft.  I have recommended to continue with aspirin and statin.  She will also continue on Zoloft 50 mg daily.  Follow-up will be on as needed basis    Thank you again for this referral, please feel free to contact me if you have any questions.    Russell Gutierrez MD  [facility]  (Formerly, [facility]     HISTORY OF PRESENT ILLNESS     Patient is a 33-year-old female with diffuse connective tissue disease, PPD positive, Sjogren syndrome, rheumatoid arthritis, latent tuberculosis, generalized anxiety disorder who was initially evaluated on 2023 for incidental left cerebellar infarction and left PICA occlusion.  She was seen in the emergency room for tremors and initially was felt to be a side effect of Plaquenil but she had a CT that showed chronic left cerebellar lacunar infarct.  CTA showed left PICA occlusion.  Echocardiogram did not show any right-to-left shunt.  Hypercoagulable workup included normal  included normal lupus anticoagulant, protein C  chromogenic protein S antigen, factor II and factor V mutation, cardiolipin antibody, beta-2 glycoprotein, Antithrombin III antibody.  Lipid panel showed an LDL of 93.    Since her left cerebellar infarction she had multiple vague symptoms that she reports as internal shaking, tongue twitching and muscle spasm and clearly had anxiety disorder but previously EEG was normal.  I had recommended increasing the dose of Zoloft to 50 mg daily and EEG was repeated that was normal.  She feels after she started taking Zoloft there was improvement in her symptoms.  She has multiple vague symptoms including periorbital muscle twitching that occurs when she is under stress.  She denies any further episodes of focal weakness numbness or any speech difficulty     PROBLEM LIST   Patient Active Problem List   Diagnosis Code    Breast mass, left N63.20    Breast mass, right N63.10    Diffuse connective tissue disease (H24) M35.9    Dry eyes, bilateral H04.123    PPD positive R76.11    Rheumatoid arthritis involving multiple sites with positive rheumatoid factor (H) M05.79    Sjogren's syndrome with keratoconjunctivitis sicca (H24) M35.01    SS-A antibody positive R76.8    TB lung, latent Z22.7    H/O Lt cerebellar stroke Z86.73    Tremor R25.1    Lt. PICA occlusion I66.3         PAST MEDICAL & SURGICAL HISTORY     Past Medical History:   Patient  has a past medical history of Rheumatoid arthritis (H).    Surgical History:  She  has no past surgical history on file.     SOCIAL HISTORY     Reviewed, and she  reports that she has never smoked. She does not have any smokeless tobacco history on file. She reports current alcohol use. She reports that she does not use drugs.     FAMILY HISTORY     Reviewed, and family history is not on file.     ALLERGIES     No Known Allergies      REVIEW OF SYSTEMS     A 12 point review of system was performed and was negative except as outlined in the history of present illness.     HOME MEDICATIONS      Current Outpatient Rx   Medication Sig Dispense Refill    aspirin (ASA) 81 MG EC tablet Take 1 tablet (81 mg) by mouth daily      CALCIUM PO Take 1 tablet by mouth daily      Cyanocobalamin (VITAMIN B-12 PO) Take 1 tablet by mouth daily      folic acid (FOLVITE) 1 MG tablet       hydroxychloroquine (PLAQUENIL) 200 MG tablet Take 300 mg by mouth daily      predniSONE (DELTASONE) 2.5 MG tablet Take 2.5 mg by mouth daily For RA. When feeling a flare up, take 2 tabs (5mg) daily.      sertraline (ZOLOFT) 50 MG tablet Take 1 tablet (50 mg) by mouth daily 30 tablet 11    albuterol (PROAIR HFA/PROVENTIL HFA/VENTOLIN HFA) 108 (90 Base) MCG/ACT inhaler Inhale 2 puffs into the lungs every 6 hours as needed for shortness of breath, wheezing or cough      MAGNESIUM PO Take 1 tablet by mouth daily      POTASSIUM PO Take 1 tablet by mouth daily      VITAMIN A PO Take 1 tablet by mouth daily      VITAMIN D PO Take 1 tablet by mouth daily           PHYSICAL EXAM     Vital signs  /62 (BP Location: Left arm, Patient Position: Sitting)   Pulse 81   Ht 1.524 m (5')   Wt 55.8 kg (123 lb)   BMI 24.02 kg/m      Weight:   123 lbs 0 oz    Patient is alert and oriented x4 in no acute distress. Vital signs were reviewed and are documented in electronic medical record. Neck was supple, no carotid bruits, thyromegaly, JVD, or lymphadenopathy was noted.   NEUROLOGY EXAM:   Patient s speech was normal with no aphasia or dysarthria. Mentation, and affect were also normal.    Funduscopic exam was normal, with normal cup to disc ratio. Cranial nerves II -XII were intact.    Patient had normal mass, tone and motor strength was 5/5 in all extremities without pronator drift.    Sensation was intact to light touch, pinprick, and vibratory sensation.    Reflexes were 1+ symmetrical with downgoing toes.    No dysmetria noted on FNF or HKS. Romberg was negative.   Gait testing was normal. Able to walk on toes/heels. Tandem walk normal.      PERTINENT DIAGNOSTIC STUDIES     Following studies were reviewed:     CTA HEAD AND NECK 2/12/2023  1. No high-grade stenosis or large vessel occlusion involving the  major intracranial arteries.  2. Probable variant arterial supply of the left PICA territory, as  described.  3. Patent cervical carotid and vertebral arteries without stenosis or  dissection.  4. No intracranial aneurysm or high-flow vascular malformation.     MRI BRAIN 2/1/2023  HEAD MRI:  1.  No acute ischemia, mass/mass effect, or abnormal intracranial enhancement.   2.  Chronic left inferior cerebellar infarct.     HEAD MRA:  1.  No proximal cerebral artery branch vessel occlusion, aneurysm, or vascular malformation.   2.  No flow related enhancement corresponding with the left PICA. While this can be seen due to diminutive vessel caliber/hypoplasia, the presence of chronic ipsilateral PICA territory infarct may indicate a component of underlying chronic flow   compromise.     NECK MRA:  1.  No flow-limiting stenosis or findings of dissection.     EEG 7/24/23  This is a normal awake EEG. Predominantly frontal faster beta activity likely is medication effect please note that the absence of epileptiform abnormalities on EEG does not rule out the possibility of seizures.     EEG 1/30/2023  This is a normal awake EEG. Please note that the absence of epileptiform abnormalities on EEG does not rule out the possibility of seizures.      ECHOCARDIOGRAM 2/3/2023  A contrast injection (Bubble Study) was performed that was negative for flow  across the interatrial septum.  Normal echocardiogram.  Compared to the prior study dated 2/2/2023, there have been no changes.     30-DAY EVENT MONITOR 2/3/2023  No significant arrhythmia     PERTINENT LABS  Following labs were reviewed:  No visits with results within 3 Month(s) from this visit.   Latest known visit with results is:   Office Visit on 04/13/2023   Component Date Value Ref Range Status    TSH  04/13/2023 2.44  0.30 - 4.20 uIU/mL Final    Free T4 04/13/2023 1.38  0.90 - 1.70 ng/dL Final    T3 Free 04/13/2023 3.0  2.0 - 4.4 pg/mL Final    Thyroid Peroxidase Antibody 04/13/2023 <10  <35 IU/mL Final    Sodium 04/13/2023 139  136 - 145 mmol/L Final    Potassium 04/13/2023 4.0  3.4 - 5.3 mmol/L Final    Chloride 04/13/2023 104  98 - 107 mmol/L Final    Carbon Dioxide (CO2) 04/13/2023 19 (L)  22 - 29 mmol/L Final    Anion Gap 04/13/2023 16 (H)  7 - 15 mmol/L Final    Urea Nitrogen 04/13/2023 8.0  6.0 - 20.0 mg/dL Final    Creatinine 04/13/2023 0.69  0.51 - 0.95 mg/dL Final    Calcium 04/13/2023 9.3  8.6 - 10.0 mg/dL Final    Glucose 04/13/2023 91  70 - 99 mg/dL Final    Alkaline Phosphatase 04/13/2023 55  35 - 104 U/L Final    AST 04/13/2023 21  10 - 35 U/L Final    ALT 04/13/2023 12  10 - 35 U/L Final    Protein Total 04/13/2023 7.7  6.4 - 8.3 g/dL Final    Albumin 04/13/2023 4.6  3.5 - 5.2 g/dL Final    Bilirubin Total 04/13/2023 0.7  <=1.2 mg/dL Final    GFR Estimate 04/13/2023 >90  >60 mL/min/1.73m2 Final    Erythrocyte Sedimentation Rate 04/13/2023 12  0 - 20 mm/hr Final    HIV Antigen Antibody Combo 04/13/2023 Nonreactive  Nonreactive Final    Hepatitis C Antibody 04/13/2023 Nonreactive  Nonreactive Final    WBC Count 04/13/2023 4.6  4.0 - 11.0 10e3/uL Final    RBC Count 04/13/2023 4.54  3.80 - 5.20 10e6/uL Final    Hemoglobin 04/13/2023 14.3  11.7 - 15.7 g/dL Final    Hematocrit 04/13/2023 41.5  35.0 - 47.0 % Final    MCV 04/13/2023 91  78 - 100 fL Final    MCH 04/13/2023 31.5  26.5 - 33.0 pg Final    MCHC 04/13/2023 34.5  31.5 - 36.5 g/dL Final    RDW 04/13/2023 12.3  10.0 - 15.0 % Final    Platelet Count 04/13/2023 184  150 - 450 10e3/uL Final         Total time spent for face to face visit, reviewing labs/imaging studies, counseling and coordination of care was: 30 Minutes spent on the date of the encounter doing chart review, review of outside records, review of test results, interpretation of tests,  patient visit, and documentation     This note was dictated using voice recognition software.  Any grammatical or context distortions are unintentional and inherent to the software.    No orders of the defined types were placed in this encounter.     New Prescriptions    No medications on file     Modified Medications    No medications on file

## 2024-01-29 ENCOUNTER — OFFICE VISIT (OUTPATIENT)
Dept: NEUROLOGY | Facility: CLINIC | Age: 34
End: 2024-01-29
Payer: MEDICAID

## 2024-01-29 VITALS
DIASTOLIC BLOOD PRESSURE: 62 MMHG | BODY MASS INDEX: 24.15 KG/M2 | SYSTOLIC BLOOD PRESSURE: 103 MMHG | HEIGHT: 60 IN | WEIGHT: 123 LBS | HEART RATE: 81 BPM

## 2024-01-29 DIAGNOSIS — I66.3: Primary | ICD-10-CM

## 2024-01-29 DIAGNOSIS — F41.1 GAD (GENERALIZED ANXIETY DISORDER): ICD-10-CM

## 2024-01-29 PROCEDURE — 99214 OFFICE O/P EST MOD 30 MIN: CPT | Performed by: PSYCHIATRY & NEUROLOGY

## 2024-01-29 NOTE — LETTER
2024         RE: Adelita Bosch  [address] [facility] [address]        Dear Colleague,    Thank you for referring your patient, Adelita Bosch, to the [facility] [place]. Please see a copy of my visit note below.    NEUROLOGY FOLLOW UP VISIT  NOTE       [facility]UR[facility] [place]  [address] [place] [address]  Tel: [phone]  Fax: [phone]  [email or web address]     Adelita Bosch,  1990, MRN [id number]  PCP: No Ref-Primary, Physician  Date: 2024      ASSESSMENT & PLAN     Visit Diagnosis  Thrombosis of posterior inferior cerebellar artery  SRAVANI (generalized anxiety disorder)     History of stroke  33-year-old female with diffuse connective tissue disease, PPD positive, Sjogren's syndrome, rheumatoid arthritis, latent tuberculosis, SRAVANI was evaluated for incidental left cerebellar infarction and left PICA occlusion.  Extensive lab work for hypercoagulable states was negative.  She was started on aspirin and a cholesterol-lowering medicine.  Her LDL was 93.  She had multiple vague symptoms likely due to underlying anxiety and benefited after she was started on Zoloft.  I have recommended to continue with aspirin and statin.  She will also continue on Zoloft 50 mg daily.  Follow-up will be on as needed basis    Thank you again for this referral, please feel free to contact me if you have any questions.    Russell Gutierrez MD  [facility]  ([facility], [facility]     HISTORY OF PRESENT ILLNESS     Patient is a 33-year-old female with diffuse connective tissue disease, PPD positive, Sjogren syndrome, rheumatoid arthritis, latent tuberculosis, generalized anxiety disorder who was initially evaluated on 2023 for incidental left cerebellar infarction and left PICA occlusion.  She was seen in the emergency room for tremors and initially was felt to be a side  effect of Plaquenil but she had a CT that showed chronic left cerebellar lacunar infarct.  CTA showed left PICA occlusion.  Echocardiogram did not show any right-to-left shunt.  Hypercoagulable workup included normal  included normal lupus anticoagulant, protein C chromogenic protein S antigen, factor II and factor V mutation, cardiolipin antibody, beta-2 glycoprotein, Antithrombin III antibody.  Lipid panel showed an LDL of 93.    Since her left cerebellar infarction she had multiple vague symptoms that she reports as internal shaking, tongue twitching and muscle spasm and clearly had anxiety disorder but previously EEG was normal.  I had recommended increasing the dose of Zoloft to 50 mg daily and EEG was repeated that was normal.  She feels after she started taking Zoloft there was improvement in her symptoms.  She has multiple vague symptoms including periorbital muscle twitching that occurs when she is under stress.  She denies any further episodes of focal weakness numbness or any speech difficulty     PROBLEM LIST   Patient Active Problem List   Diagnosis Code     Breast mass, left N63.20     Breast mass, right N63.10     Diffuse connective tissue disease (H24) M35.9     Dry eyes, bilateral H04.123     PPD positive R76.11     Rheumatoid arthritis involving multiple sites with positive rheumatoid factor (H) M05.79     Sjogren's syndrome with keratoconjunctivitis sicca (H24) M35.01     SS-A antibody positive R76.8     TB lung, latent Z22.7     H/O Lt cerebellar stroke Z86.73     Tremor R25.1     Lt. PICA occlusion I66.3         PAST MEDICAL & SURGICAL HISTORY     Past Medical History:   Patient  has a past medical history of Rheumatoid arthritis (H).    Surgical History:  She  has no past surgical history on file.     SOCIAL HISTORY     Reviewed, and she  reports that she has never smoked. She does not have any smokeless tobacco history on file. She reports current alcohol use. She reports that she does not use  drugs.     FAMILY HISTORY     Reviewed, and family history is not on file.     ALLERGIES     No Known Allergies      REVIEW OF SYSTEMS     A 12 point review of system was performed and was negative except as outlined in the history of present illness.     HOME MEDICATIONS     Current Outpatient Rx   Medication Sig Dispense Refill     aspirin (ASA) 81 MG EC tablet Take 1 tablet (81 mg) by mouth daily       CALCIUM PO Take 1 tablet by mouth daily       Cyanocobalamin (VITAMIN B-12 PO) Take 1 tablet by mouth daily       folic acid (FOLVITE) 1 MG tablet        hydroxychloroquine (PLAQUENIL) 200 MG tablet Take 300 mg by mouth daily       predniSONE (DELTASONE) 2.5 MG tablet Take 2.5 mg by mouth daily For RA. When feeling a flare up, take 2 tabs (5mg) daily.       sertraline (ZOLOFT) 50 MG tablet Take 1 tablet (50 mg) by mouth daily 30 tablet 11     albuterol (PROAIR HFA/PROVENTIL HFA/VENTOLIN HFA) 108 (90 Base) MCG/ACT inhaler Inhale 2 puffs into the lungs every 6 hours as needed for shortness of breath, wheezing or cough       MAGNESIUM PO Take 1 tablet by mouth daily       POTASSIUM PO Take 1 tablet by mouth daily       VITAMIN A PO Take 1 tablet by mouth daily       VITAMIN D PO Take 1 tablet by mouth daily           PHYSICAL EXAM     Vital signs  /62 (BP Location: Left arm, Patient Position: Sitting)   Pulse 81   Ht 1.524 m (5')   Wt 55.8 kg (123 lb)   BMI 24.02 kg/m      Weight:   123 lbs 0 oz    Patient is alert and oriented x4 in no acute distress. Vital signs were reviewed and are documented in electronic medical record. Neck was supple, no carotid bruits, thyromegaly, JVD, or lymphadenopathy was noted.   NEUROLOGY EXAM:    Patient s speech was normal with no aphasia or dysarthria. Mentation, and affect were also normal.     Funduscopic exam was normal, with normal cup to disc ratio. Cranial nerves II -XII were intact.     Patient had normal mass, tone and motor strength was 5/5 in all extremities  without pronator drift.     Sensation was intact to light touch, pinprick, and vibratory sensation.     Reflexes were 1+ symmetrical with downgoing toes.     No dysmetria noted on FNF or HKS. Romberg was negative.    Gait testing was normal. Able to walk on toes/heels. Tandem walk normal.     PERTINENT DIAGNOSTIC STUDIES     Following studies were reviewed:     CTA HEAD AND NECK 2/12/2023  1. No high-grade stenosis or large vessel occlusion involving the  major intracranial arteries.  2. Probable variant arterial supply of the left PICA territory, as  described.  3. Patent cervical carotid and vertebral arteries without stenosis or  dissection.  4. No intracranial aneurysm or high-flow vascular malformation.     MRI BRAIN 2/1/2023  HEAD MRI:  1.  No acute ischemia, mass/mass effect, or abnormal intracranial enhancement.   2.  Chronic left inferior cerebellar infarct.     HEAD MRA:  1.  No proximal cerebral artery branch vessel occlusion, aneurysm, or vascular malformation.   2.  No flow related enhancement corresponding with the left PICA. While this can be seen due to diminutive vessel caliber/hypoplasia, the presence of chronic ipsilateral PICA territory infarct may indicate a component of underlying chronic flow   compromise.     NECK MRA:  1.  No flow-limiting stenosis or findings of dissection.     EEG 7/24/23  This is a normal awake EEG. Predominantly frontal faster beta activity likely is medication effect please note that the absence of epileptiform abnormalities on EEG does not rule out the possibility of seizures.     EEG 1/30/2023  This is a normal awake EEG. Please note that the absence of epileptiform abnormalities on EEG does not rule out the possibility of seizures.      ECHOCARDIOGRAM 2/3/2023  A contrast injection (Bubble Study) was performed that was negative for flow  across the interatrial septum.  Normal echocardiogram.  Compared to the prior study dated 2/2/2023, there have been no changes.      30-DAY EVENT MONITOR 2/3/2023  No significant arrhythmia     PERTINENT LABS  Following labs were reviewed:  No visits with results within 3 Month(s) from this visit.   Latest known visit with results is:   Office Visit on 04/13/2023   Component Date Value Ref Range Status     TSH 04/13/2023 2.44  0.30 - 4.20 uIU/mL Final     Free T4 04/13/2023 1.38  0.90 - 1.70 ng/dL Final     T3 Free 04/13/2023 3.0  2.0 - 4.4 pg/mL Final     Thyroid Peroxidase Antibody 04/13/2023 <10  <35 IU/mL Final     Sodium 04/13/2023 139  136 - 145 mmol/L Final     Potassium 04/13/2023 4.0  3.4 - 5.3 mmol/L Final     Chloride 04/13/2023 104  98 - 107 mmol/L Final     Carbon Dioxide (CO2) 04/13/2023 19 (L)  22 - 29 mmol/L Final     Anion Gap 04/13/2023 16 (H)  7 - 15 mmol/L Final     Urea Nitrogen 04/13/2023 8.0  6.0 - 20.0 mg/dL Final     Creatinine 04/13/2023 0.69  0.51 - 0.95 mg/dL Final     Calcium 04/13/2023 9.3  8.6 - 10.0 mg/dL Final     Glucose 04/13/2023 91  70 - 99 mg/dL Final     Alkaline Phosphatase 04/13/2023 55  35 - 104 U/L Final     AST 04/13/2023 21  10 - 35 U/L Final     ALT 04/13/2023 12  10 - 35 U/L Final     Protein Total 04/13/2023 7.7  6.4 - 8.3 g/dL Final     Albumin 04/13/2023 4.6  3.5 - 5.2 g/dL Final     Bilirubin Total 04/13/2023 0.7  <=1.2 mg/dL Final     GFR Estimate 04/13/2023 >90  >60 mL/min/1.73m2 Final     Erythrocyte Sedimentation Rate 04/13/2023 12  0 - 20 mm/hr Final     HIV Antigen Antibody Combo 04/13/2023 Nonreactive  Nonreactive Final     Hepatitis C Antibody 04/13/2023 Nonreactive  Nonreactive Final     WBC Count 04/13/2023 4.6  4.0 - 11.0 10e3/uL Final     RBC Count 04/13/2023 4.54  3.80 - 5.20 10e6/uL Final     Hemoglobin 04/13/2023 14.3  11.7 - 15.7 g/dL Final     Hematocrit 04/13/2023 41.5  35.0 - 47.0 % Final     MCV 04/13/2023 91  78 - 100 fL Final     MCH 04/13/2023 31.5  26.5 - 33.0 pg Final     MCHC 04/13/2023 34.5  31.5 - 36.5 g/dL Final     RDW 04/13/2023 12.3  10.0 - 15.0 % Final      Platelet Count 04/13/2023 184  150 - 450 10e3/uL Final         Total time spent for face to face visit, reviewing labs/imaging studies, counseling and coordination of care was: 30 Minutes spent on the date of the encounter doing chart review, review of outside records, review of test results, interpretation of tests, patient visit, and documentation     This note was dictated using voice recognition software.  Any grammatical or context distortions are unintentional and inherent to the software.    No orders of the defined types were placed in this encounter.     New Prescriptions    No medications on file     Modified Medications    No medications on file                 Again, thank you for allowing me to participate in the care of your patient.        Sincerely,        Russell Gutierrez MD

## 2024-01-29 NOTE — NURSING NOTE
Chief Complaint   Patient presents with    H/O left cerebellar infarction     6 month follow up     Cassidy Cee CMA on 1/29/2024 at 11:56 AM  [facility]

## 2024-02-12 NOTE — CONFIDENTIAL NOTE
Patient Quality Outreach    Patient is due for the following:   IVD  -  IVD Follow-up Visit    Next Steps:   No follow up needed at this time.    Type of outreach:    Sent letter.      Questions for provider review:    None           Yash Anne MA

## 2024-05-13 NOTE — CONFIDENTIAL NOTE
Patient Quality Outreach    Patient is due for the following:   Cervical Cancer Screening - PAP Needed  Physical Preventive Adult Physical    Next Steps:   Schedule a Adult Preventative    Type of outreach:    Sent letter.      Questions for provider review:    None           Yash Anne MA

## 2024-07-13 ENCOUNTER — HEALTH MAINTENANCE LETTER (OUTPATIENT)
Age: 34
End: 2024-07-13

## 2024-09-24 ENCOUNTER — MYC MEDICAL ADVICE (OUTPATIENT)
Dept: FAMILY MEDICINE | Facility: CLINIC | Age: 34
End: 2024-09-24
Payer: MEDICAID

## 2024-09-24 ENCOUNTER — NURSE TRIAGE (OUTPATIENT)
Dept: FAMILY MEDICINE | Facility: CLINIC | Age: 34
End: 2024-09-24
Payer: MEDICAID

## 2024-09-24 NOTE — TELEPHONE ENCOUNTER
I think OV is fine.  No PCP listed but seeing anyone today or tomorrow would be ok.  Try the extenders?  If worsening then should go to [facility]>    [facility]

## 2024-09-24 NOTE — TELEPHONE ENCOUNTER
RN called patient and informed of provider recommendation below. Scheduled patient tomorrow in clinic. Advised patient go to URGENT CARE if worsening. Patient verbalized understanding and agreeable to plan.     CHIO Sage, RN  [facility]

## 2024-09-24 NOTE — TELEPHONE ENCOUNTER
Nurse Triage SBAR    Is this a 2nd Level Triage? YES, LICENSED PRACTITIONER REVIEW IS REQUIRED    Situation: Patient reports constant upper abdominal pain on right side under ribs.    Background:   -Patient has history of intermittent abdominal pain but has new onset of constant pain on right side below ribs.   -Patient rates pain as mild, 2/10. Reports some mild tenderness to touch  -Patient reports bloating, burning sensation  -Patient had chills on Saturday but denies now.     Denies: chest pain, fever, urination pain, vomiting, diarrhea    Assessment:   Patient to be seen today, requests appointment with PCP or another provider in clinic  Please advise if patient should be seen in urgent care or would be appropriate for clinic visit today.     Protocol Recommended Disposition:   Go To ED/UCC Now (Or To Office With PCP Approval)    Recommendation: Please advise if patient should be seen in urgent care or would be appropriate for clinic visit today.      Routed to provider    Does the patient meet one of the following criteria for ADS visit consideration? 16+ years old, with an [facility] PCP     TIP  Providers, please consider if this condition is appropriate for management at one of our Acute and Diagnostic Services sites.     If patient is a good candidate, please use dotphrase <dot>triageresponse and select Refer to ADS to document.      Reason for Disposition   MILD TO MODERATE constant pain lasting > 2 hours    Additional Information   Negative: SEVERE difficulty breathing (e.g., struggling for each breath, speaks in single words)   Negative: Shock suspected (e.g., cold/pale/clammy skin, too weak to stand, low BP, rapid pulse)   Negative: Difficult to awaken or acting confused (e.g., disoriented, slurred speech)   Negative: Passed out (i.e., lost consciousness, collapsed and was not responding)   Negative: Visible sweat on face or sweat is dripping down   Negative: Sounds like a life-threatening emergency to the  "triager   Negative: Followed an abdomen (stomach) injury   Negative: Chest pain   Negative: Abdominal pain and pregnant < 20 weeks   Negative: Abdominal pain and pregnant 20 or more weeks   Negative: Abdomen bloating or swelling are main symptoms   Negative: SEVERE abdominal pain (e.g., excruciating)   Negative: Pain lasting > 10 minutes and over 35 years old and at least one cardiac risk factor (e.g., diabetes, high cholesterol, hypertension, obesity, smoker or strong family history of heart disease)   Negative: Pain lasting > 10 minutes and history of heart disease (i.e., heart attack, bypass surgery, angina, angioplasty, CHF)   Negative: Pain lasting > 10 minutes and over 40 years old and associated chest, arm, neck, upper back, or jaw pain   Negative: Pain lasting > 10 minutes and over 50 years old   Negative: Pain lasts > 10 minutes and difficulty breathing   Negative: Vomiting red blood or black (coffee ground) material  (Exception: Few streaks and only occurred once.)   Negative: Blood in bowel movements  (Exception: Blood on surface of BM with constipation.)   Negative: Black or tarry bowel movements  (Exception: Chronic-unchanged black-grey BMs AND is taking iron pills or Pepto-Bismol.)   Negative: Pregnant 20 weeks or more and new hand or face swelling    Answer Assessment - Initial Assessment Questions  1. LOCATION: \"Where does it hurt?\"       Pain in abdomen on right side below rib  2. RADIATION: \"Does the pain shoot anywhere else?\" (e.g., chest, back)      Radiates to lower back  3. ONSET: \"When did the pain begin?\" (e.g., minutes, hours or days ago)       Friday, 4-5 days  4. SUDDEN: \"Gradual or sudden onset?\"      Patient experiences intermittent pain but it has been constant since friday  5. PATTERN \"Does the pain come and go, or is it constant?\"     - If it comes and goes: \"How long does it last?\" \"Do you have pain now?\"      (Note: Comes and goes means the pain is intermittent. It goes away " "completely between bouts.)     - If constant: \"Is it getting better, staying the same, or getting worse?\"       (Note: Constant means the pain never goes away completely; most serious pain is constant and gets worse.)       Constant and has been the same since Friday  6. SEVERITY: \"How bad is the pain?\"  (e.g., Scale 1-10; mild, moderate, or severe)     - MILD (1-3): Doesn't interfere with normal activities, abdomen soft and not tender to touch..      - MODERATE (4-7): Interferes with normal activities or awakens from sleep, abdomen tender to touch.      - SEVERE (8-10): Excruciating pain, doubled over, unable to do any normal activities.          Rates pain as 2/10 patient \"can manage the pain\", does not interfere with activities, some tenderness to touch  7. RECURRENT SYMPTOM: \"Have you ever had this type of stomach pain before?\" If Yes, ask: \"When was the last time?\" and \"What happened that time?\"       Has intermittent stomach pain but this new pain has been constant since Friday  8. AGGRAVATING FACTORS: \"Does anything seem to cause this pain?\" (e.g., foods, stress, alcohol)       Patient denies relieving factors. Patient reports when she is moving around or sitting for long period of time aggravating, greasy and spicy food, coffee make it worse.  9. CARDIAC SYMPTOMS: \"Do you have any of the following symptoms: chest pain, difficulty breathing, sweating, nausea?\"      Nausea  10. OTHER SYMPTOMS: \"Do you have any other symptoms?\" (e.g., back pain, diarrhea, fever, urination pain, vomiting)        Pain in lower back   11. PREGNANCY: \"Is there any chance you are pregnant?\" \"When was your last menstrual period?\"        Denies    Protocols used: Abdominal Pain - Upper-A-[place]    "

## 2024-09-25 ENCOUNTER — OFFICE VISIT (OUTPATIENT)
Dept: FAMILY MEDICINE | Facility: CLINIC | Age: 34
End: 2024-09-25
Payer: MEDICAID

## 2024-09-25 ENCOUNTER — ANCILLARY PROCEDURE (OUTPATIENT)
Dept: GENERAL RADIOLOGY | Facility: CLINIC | Age: 34
End: 2024-09-25
Attending: NURSE PRACTITIONER
Payer: MEDICAID

## 2024-09-25 VITALS
BODY MASS INDEX: 27.66 KG/M2 | WEIGHT: 140.9 LBS | RESPIRATION RATE: 15 BRPM | OXYGEN SATURATION: 99 % | TEMPERATURE: 98.1 F | DIASTOLIC BLOOD PRESSURE: 70 MMHG | HEART RATE: 75 BPM | SYSTOLIC BLOOD PRESSURE: 112 MMHG | HEIGHT: 60 IN

## 2024-09-25 DIAGNOSIS — R10.9 RIGHT SIDED ABDOMINAL PAIN: ICD-10-CM

## 2024-09-25 DIAGNOSIS — Z12.4 CERVICAL CANCER SCREENING: Primary | ICD-10-CM

## 2024-09-25 LAB
ALBUMIN SERPL BCG-MCNC: 4.8 G/DL (ref 3.5–5.2)
ALBUMIN UR-MCNC: NEGATIVE MG/DL
ALP SERPL-CCNC: 59 U/L (ref 40–150)
ALT SERPL W P-5'-P-CCNC: 10 U/L (ref 0–50)
ANION GAP SERPL CALCULATED.3IONS-SCNC: 8 MMOL/L (ref 7–15)
APPEARANCE UR: CLEAR
AST SERPL W P-5'-P-CCNC: 19 U/L (ref 0–45)
BACTERIA #/AREA URNS HPF: ABNORMAL /HPF
BILIRUB SERPL-MCNC: 1 MG/DL
BILIRUB UR QL STRIP: NEGATIVE
BUN SERPL-MCNC: 9.7 MG/DL (ref 6–20)
CALCIUM SERPL-MCNC: 9.4 MG/DL (ref 8.8–10.4)
CHLORIDE SERPL-SCNC: 103 MMOL/L (ref 98–107)
COLOR UR AUTO: YELLOW
CREAT SERPL-MCNC: 0.64 MG/DL (ref 0.51–0.95)
EGFRCR SERPLBLD CKD-EPI 2021: >90 ML/MIN/1.73M2
GLUCOSE SERPL-MCNC: 94 MG/DL (ref 70–99)
GLUCOSE UR STRIP-MCNC: NEGATIVE MG/DL
HCO3 SERPL-SCNC: 26 MMOL/L (ref 22–29)
HGB UR QL STRIP: ABNORMAL
KETONES UR STRIP-MCNC: NEGATIVE MG/DL
LEUKOCYTE ESTERASE UR QL STRIP: NEGATIVE
LIPASE SERPL-CCNC: 20 U/L (ref 13–60)
NITRATE UR QL: NEGATIVE
PH UR STRIP: 6 [PH] (ref 5–7)
POTASSIUM SERPL-SCNC: 3.9 MMOL/L (ref 3.4–5.3)
PROT SERPL-MCNC: 8.1 G/DL (ref 6.4–8.3)
RBC #/AREA URNS AUTO: ABNORMAL /HPF
SODIUM SERPL-SCNC: 137 MMOL/L (ref 135–145)
SP GR UR STRIP: 1.01 (ref 1–1.03)
SQUAMOUS #/AREA URNS AUTO: ABNORMAL /LPF
UROBILINOGEN UR STRIP-ACNC: 0.2 E.U./DL
WBC #/AREA URNS AUTO: ABNORMAL /HPF

## 2024-09-25 PROCEDURE — 81001 URINALYSIS AUTO W/SCOPE: CPT | Performed by: NURSE PRACTITIONER

## 2024-09-25 PROCEDURE — G2211 COMPLEX E/M VISIT ADD ON: HCPCS | Performed by: NURSE PRACTITIONER

## 2024-09-25 PROCEDURE — 36415 COLL VENOUS BLD VENIPUNCTURE: CPT | Performed by: NURSE PRACTITIONER

## 2024-09-25 PROCEDURE — 99214 OFFICE O/P EST MOD 30 MIN: CPT | Performed by: NURSE PRACTITIONER

## 2024-09-25 PROCEDURE — 74018 RADEX ABDOMEN 1 VIEW: CPT | Mod: TC | Performed by: STUDENT IN AN ORGANIZED HEALTH CARE EDUCATION/TRAINING PROGRAM

## 2024-09-25 PROCEDURE — 80053 COMPREHEN METABOLIC PANEL: CPT | Performed by: NURSE PRACTITIONER

## 2024-09-25 PROCEDURE — 83690 ASSAY OF LIPASE: CPT | Performed by: NURSE PRACTITIONER

## 2024-09-25 RX ORDER — CYCLOBENZAPRINE HCL 10 MG
10 TABLET ORAL
COMMUNITY
Start: 2024-06-02

## 2024-09-25 ASSESSMENT — PAIN SCALES - GENERAL: PAINLEVEL: MILD PAIN (2)

## 2024-09-25 NOTE — PROGRESS NOTES
Adelita is a 34 year old No obstetric history on file. female who presents for annual exam.     Besides routine health maintenance, she and her  have been trying to get pregnant for 1.5 years. She tracks her cycles and she has regular periods. She has rheumatoid arthritis and is on meditation. Her specialists have said that this should not effect her fertility.   She has pelvic pain on and off and knows of someone who had uterine cancer. She is interested in an US.    Menses are regular q 28-30 days and normal  Menses flow: normal.    Patient's last menstrual period was 09/15/2024 (exact date)..   Using none for contraception.    She is currently considering pregnancy.    REPRODUCTIVE/GYNECOLOGIC HISTORY:  Adelita is sexually active with male partner(s) and is currently in monogamous relationship.   STI testing offered?  Accepted  History of abnormal Pap smear:  No  SOCIAL HISTORY  Abuse: does not report having previously been physical or sexually abused.    Do you feel safe in your environment? YES      She  reports that she has never smoked. She does not have any smokeless tobacco history on file.    PHQ-2 Score:         10/1/2024     2:46 PM 9/25/2024    10:38 AM   PHQ-2 ( 1999 Pf[employer])   Q1: Little interest or pleasure in doing things 0 0   Q2: Feeling down, depressed or hopeless 0 0   PHQ-2 Score 0 0   Q1: Little interest or pleasure in doing things  Not at all   Q2: Feeling down, depressed or hopeless  Not at all   PHQ-2 Score  0       Last PHQ-9 score on record =       5/15/2023    12:00 PM   PHQ-9 SCORE   PHQ-9 Total Score 5     Last GAD7 score on record =       5/15/2023    12:00 PM   SRAVANI-7 SCORE   Total Score 5       HEALTH MAINTENANCE:    Never done COVID-19 Vaccine (1)      Never done Pneumococcal Vaccine: Pediatrics (0 to 5 Years) and At-Risk Patients (6 to 64 Years) (1 of 2 - PCV)     MAR 16  2024 PAP (Every 3 Years)   Order placed this encounter     APR 13 2024 YEARLY PREVENTIVE VISIT  (Yearly)  Last completed: Apr 13, 2023     SEP 1  2024 INFLUENZA VACCINE (1)  Last completed: Nov 4, 2019           HEALTHY HABITS  Eating habits: eats regular meals  Calcium/Vitamin D intake: source:  dietary supplement(s) Adequate?       HISTORY:  OB History   No obstetric history on file.     Past Medical History:   Diagnosis Date    Rheumatoid arthritis (H)      No past surgical history on file.  No family history on file.  Social History     Socioeconomic History    Marital status:    Tobacco Use    Smoking status: Never   Vaping Use    Vaping status: Never Used   Substance and Sexual Activity    Alcohol use: Yes     Comment: rare    Drug use: Never       Current Outpatient Medications:     aspirin (ASA) 81 MG EC tablet, Take 1 tablet (81 mg) by mouth daily, Disp: , Rfl:     certolizumab pegol (CIMZIA) 200 MG/ML PSKT 2 syringes/kit, Inject 200 mg subcutaneously., Disp: , Rfl:     cyclobenzaprine (FLEXERIL) 10 MG tablet, Take 10 mg by mouth., Disp: , Rfl:     diclofenac (VOLTAREN) 1 % topical gel, APPLY 2-4 GRAM TO AFFECTED AREA OVER THE SKIN THREE TIMES DAILY FOR 30 DAYS., Disp: , Rfl:     folic acid (FOLVITE) 1 MG tablet, , Disp: , Rfl:     hydroxychloroquine (PLAQUENIL) 200 MG tablet, Take 300 mg by mouth daily, Disp: , Rfl:     omeprazole (PRILOSEC) 20 MG DR capsule, Take 1 capsule (20 mg) by mouth daily., Disp: 30 capsule, Rfl: 0    predniSONE (DELTASONE) 2.5 MG tablet, Take 2.5 mg by mouth daily For RA. When feeling a flare up, take 2 tabs (5mg) daily., Disp: , Rfl:     sertraline (ZOLOFT) 50 MG tablet, Take 1 tablet (50 mg) by mouth daily, Disp: 90 tablet, Rfl: 3   No Known Allergies    Past medical, surgical, social and family history were reviewed and updated in EPIC.    ROS:   12 point review of systems negative other than symptoms noted below or in the HPI.    PHYSICAL EXAM:  /54   Ht 1.524 m (5')   Wt 65.8 kg (145 lb)   LMP 09/15/2024 (Exact Date)   BMI 28.32 kg/m     BMI: Body mass  index is 28.32 kg/m .  Constitutional: healthy, alert and no distress  Neck: symmetrical, thyroid normal size, no masses present, no lymphadenopathy present.   Cardiovascular: RRR, no murmurs present  Respiratory: breathing unlabored, lungs CTA bilaterally  Breast:normal without masses, tenderness or nipple discharge  Gastrointestinal: abdomen soft, non-tender, bowel sounds present  PELVIC EXAM:  Vulva: No lesions, no adenopathy, BUS WNL  Vagina: Moist, pink, discharge normal  well rugated, no lesions  Cervix:smooth, pink, no visible lesions  Speculum placed and pap collected   Uterus: Normal size, non-tender, mobile  Ovaries: No masses palpated  Rectal exam: deferred    ASSESSMENT/PLAN:    ICD-10-CM    1. Annual physical exam  Z00.00       2. Encounter for preconception consultation  Z31.69 HPV and Gynecologic Cytology Panel - Recommended Age 30-65 Years      3. Pelvic pain in female  R10.2 US Pelvic Complete with Transvaginal      4. Screening for chlamydial disease  Z11.8 Chlamydia trachomatis/Neisseria gonorrhoeae by PCR - Clinic Collect        No results found for any visits on 10/01/24.      COUNSELING:   Reviewed preventive health counseling, as reflected in patient instructions       Regular exercise       Healthy diet/nutrition       Family planning       Folic Acid   reports that she has never smoked. She does not have any smokeless tobacco history on file.    We discussed trying ovulation kits. She can make a fertility consult appt with one of the Ob/Gyn MDs.
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
## 2024-09-25 NOTE — PATIENT INSTRUCTIONS
Start prilosec (omeprazole) to decrease stomach acid.   Will do an xray today to evaluate possibility of constipation.   Labs today.   Based on test results consider ultrasound of the gallbladder.

## 2024-10-01 ENCOUNTER — OFFICE VISIT (OUTPATIENT)
Dept: OBGYN | Facility: CLINIC | Age: 34
End: 2024-10-01
Payer: MEDICAID

## 2024-10-01 VITALS
WEIGHT: 145 LBS | BODY MASS INDEX: 28.47 KG/M2 | SYSTOLIC BLOOD PRESSURE: 110 MMHG | DIASTOLIC BLOOD PRESSURE: 54 MMHG | HEIGHT: 60 IN

## 2024-10-01 DIAGNOSIS — Z31.69 ENCOUNTER FOR PRECONCEPTION CONSULTATION: ICD-10-CM

## 2024-10-01 DIAGNOSIS — R10.2 PELVIC PAIN IN FEMALE: ICD-10-CM

## 2024-10-01 DIAGNOSIS — Z00.00 ANNUAL PHYSICAL EXAM: Primary | ICD-10-CM

## 2024-10-01 DIAGNOSIS — Z11.8 SCREENING FOR CHLAMYDIAL DISEASE: ICD-10-CM

## 2024-10-01 PROCEDURE — 87624 HPV HI-RISK TYP POOLED RSLT: CPT | Performed by: ADVANCED PRACTICE MIDWIFE

## 2024-10-01 PROCEDURE — 99213 OFFICE O/P EST LOW 20 MIN: CPT | Mod: 25 | Performed by: ADVANCED PRACTICE MIDWIFE

## 2024-10-01 PROCEDURE — 99385 PREV VISIT NEW AGE 18-39: CPT | Performed by: ADVANCED PRACTICE MIDWIFE

## 2024-10-01 PROCEDURE — G0145 SCR C/V CYTO,THINLAYER,RESCR: HCPCS | Performed by: ADVANCED PRACTICE MIDWIFE

## 2024-10-01 PROCEDURE — 87491 CHLMYD TRACH DNA AMP PROBE: CPT | Performed by: ADVANCED PRACTICE MIDWIFE

## 2024-10-01 PROCEDURE — 99459 PELVIC EXAMINATION: CPT | Performed by: ADVANCED PRACTICE MIDWIFE

## 2024-10-01 PROCEDURE — 87591 N.GONORRHOEAE DNA AMP PROB: CPT | Performed by: ADVANCED PRACTICE MIDWIFE

## 2024-10-01 NOTE — NURSING NOTE
Chief Complaint   Patient presents with    Physical       Initial /54   Ht 1.524 m (5')   Wt 65.8 kg (145 lb)   LMP 09/15/2024 (Exact Date)   BMI 28.32 kg/m   Estimated body mass index is 28.32 kg/m  as calculated from the following:    Height as of this encounter: 1.524 m (5').    Weight as of this encounter: 65.8 kg (145 lb).  BP completed using cuff size: regular    Questioned patient about current smoking habits.  Pt. has never smoked.      No obstetric history on file.    The following HM Due: pap smear    Nexplanon removed about 1 1/2 years ago, currently trying to conceive  Sometimes has right ovarian pain  Lizzy Mcnally CMA on 10/1/2024 at 2:45 PM

## 2024-10-02 LAB
C TRACH DNA SPEC QL PROBE+SIG AMP: NEGATIVE
HPV HR 12 DNA CVX QL NAA+PROBE: NEGATIVE
HPV16 DNA CVX QL NAA+PROBE: NEGATIVE
HPV18 DNA CVX QL NAA+PROBE: NEGATIVE
HUMAN PAPILLOMA VIRUS FINAL DIAGNOSIS: NORMAL
N GONORRHOEA DNA SPEC QL NAA+PROBE: NEGATIVE

## 2024-10-04 LAB
BKR AP ASSOCIATED HPV REPORT: NORMAL
BKR LAB AP GYN ADEQUACY: NORMAL
BKR LAB AP GYN INTERPRETATION: NORMAL
BKR LAB AP LMP: NORMAL
BKR LAB AP PREVIOUS ABNORMAL: NORMAL
PATH REPORT.COMMENTS IMP SPEC: NORMAL
PATH REPORT.COMMENTS IMP SPEC: NORMAL
PATH REPORT.RELEVANT HX SPEC: NORMAL

## 2024-10-07 PROBLEM — Z12.4 CERVICAL CANCER SCREENING: Status: ACTIVE | Noted: 2024-10-07

## 2024-10-17 ENCOUNTER — ANCILLARY PROCEDURE (OUTPATIENT)
Dept: ULTRASOUND IMAGING | Facility: CLINIC | Age: 34
End: 2024-10-17
Attending: ADVANCED PRACTICE MIDWIFE
Payer: MEDICAID

## 2024-10-17 DIAGNOSIS — R10.2 PELVIC PAIN IN FEMALE: ICD-10-CM

## 2024-10-17 PROCEDURE — 76830 TRANSVAGINAL US NON-OB: CPT | Performed by: FAMILY MEDICINE

## 2024-10-17 PROCEDURE — 76856 US EXAM PELVIC COMPLETE: CPT | Performed by: FAMILY MEDICINE

## 2024-10-30 ENCOUNTER — OFFICE VISIT (OUTPATIENT)
Dept: OBGYN | Facility: CLINIC | Age: 34
End: 2024-10-30
Payer: MEDICAID

## 2024-10-30 VITALS
WEIGHT: 144.6 LBS | DIASTOLIC BLOOD PRESSURE: 60 MMHG | BODY MASS INDEX: 28.39 KG/M2 | HEIGHT: 60 IN | SYSTOLIC BLOOD PRESSURE: 100 MMHG

## 2024-10-30 DIAGNOSIS — Z31.69 PRE-CONCEPTION COUNSELING: Primary | ICD-10-CM

## 2024-10-30 DIAGNOSIS — M05.79 RHEUMATOID ARTHRITIS INVOLVING MULTIPLE SITES WITH POSITIVE RHEUMATOID FACTOR (H): ICD-10-CM

## 2024-10-30 DIAGNOSIS — R76.8 SS-A ANTIBODY POSITIVE: ICD-10-CM

## 2024-10-30 DIAGNOSIS — Z86.73 HISTORY OF CEREBELLAR STROKE: ICD-10-CM

## 2024-10-30 DIAGNOSIS — M35.01 SJOGREN'S SYNDROME WITH KERATOCONJUNCTIVITIS SICCA (H): ICD-10-CM

## 2024-10-30 PROCEDURE — 99214 OFFICE O/P EST MOD 30 MIN: CPT | Performed by: FAMILY MEDICINE

## 2024-10-30 PROCEDURE — 82306 VITAMIN D 25 HYDROXY: CPT | Performed by: FAMILY MEDICINE

## 2024-10-30 PROCEDURE — 82157 ASSAY OF ANDROSTENEDIONE: CPT | Mod: 90 | Performed by: FAMILY MEDICINE

## 2024-10-30 PROCEDURE — 83002 ASSAY OF GONADOTROPIN (LH): CPT | Performed by: FAMILY MEDICINE

## 2024-10-30 PROCEDURE — 84144 ASSAY OF PROGESTERONE: CPT | Performed by: FAMILY MEDICINE

## 2024-10-30 PROCEDURE — 82627 DEHYDROEPIANDROSTERONE: CPT | Performed by: FAMILY MEDICINE

## 2024-10-30 PROCEDURE — 36415 COLL VENOUS BLD VENIPUNCTURE: CPT | Performed by: FAMILY MEDICINE

## 2024-10-30 PROCEDURE — 82670 ASSAY OF TOTAL ESTRADIOL: CPT | Performed by: FAMILY MEDICINE

## 2024-10-30 PROCEDURE — 82166 ASSAY ANTI-MULLERIAN HORM: CPT | Performed by: FAMILY MEDICINE

## 2024-10-30 PROCEDURE — 83001 ASSAY OF GONADOTROPIN (FSH): CPT | Performed by: FAMILY MEDICINE

## 2024-10-30 PROCEDURE — 99000 SPECIMEN HANDLING OFFICE-LAB: CPT | Performed by: FAMILY MEDICINE

## 2024-10-30 PROCEDURE — 84403 ASSAY OF TOTAL TESTOSTERONE: CPT | Performed by: FAMILY MEDICINE

## 2024-10-30 PROCEDURE — 84443 ASSAY THYROID STIM HORMONE: CPT | Performed by: FAMILY MEDICINE

## 2024-10-30 NOTE — PROGRESS NOTES
SUBJECTIVE:  Adelita Bosch is an 34 year old   woman who presents for   gynecology consult for attempting pregnancy for 18 months.      Has RA- sjogrens, + SSA antibodies  History of stroke - occurring 2 years ago, with a small lesion noted on CT scan, which was an old stroke,   Is on baby aspirin         Patient's last menstrual period was 10/18/2024 (exact date). Periods are regular q 28-30 days, lasting   4 days. Menarche @ age 14, Dysmenorrhea:none. Cyclic symptoms   include none. No intermenstrual bleeding,   spotting, or discharge.    Current contraception: none  History of abnormal Pap smear: No  Family history of uterine or ovarian cancer: M Aunt uterine or pelvic  History of abnormal mammogram: No  Family history of breast cancer: Yes: Mother with breast cancer    Infertility:    Patient presents for evaluation of infertility. Patient and partner have been attempting conception for  18  months.  Marital status:  for 12 years. Pregnancies with current partner: yes.    Menstrual and Endocrine History  LMP Patient's last menstrual period was 10/18/2024 (exact date).   Menarche 13   Shortest interval 28   Longest interval 33  days   Duration of flow 4 days   Heavy menses On an off  heavy   Clots yes   Intermenstrual bleeding no   Postcoital bleeding no   Dysmenorrhea yes   Amenorrhea not applicable   Weight change yes, started at 3 year, about 5-7 pounds   Hirsutism no   Balding no   Acne no   Galactorrhea no     Obstetrical History  History:  2 Para 2    Gynecologic History  Last PAP Up to date   Previous abdominal or pelvic surgery no   Pelvic pain yes   Endometriosis no   Hot flashes no   SILAS exposure no   Abnormal Pap no   Cervix Cryo/cone no   Sexually transmitted diseases no   Pelvic inflammatory disease no       Sexual History  Frequency about daily  times per years   Satisfied yes   Dyspareunia yes   Use of lubricant no   Douching no   Number of lifetime sex partners 2      Contraception  None    Family History  Thyroid problems  no   Heart condition or high blood pressure  no   Blood clot or stroke  Yes-stroke   Diabetes  no   Cancer  yes   Birth defects/inherited diseases  no   Infectious diseases (mumps, TB, rubella)  No- MGP  from TB in [place]   Other medical problems  no     Habits  Cigarettes:     Wife -  no      - no  Alcohol:     Wife -  no      - no  Marijuana:    Wife - no     - no             Past Medical History:   Diagnosis Date    Rheumatoid arthritis (H)           History reviewed. No pertinent family history.    History reviewed. No pertinent surgical history.    Current Outpatient Medications   Medication Sig Dispense Refill    aspirin (ASA) 81 MG EC tablet Take 1 tablet (81 mg) by mouth daily      certolizumab pegol (CIMZIA) 200 MG/ML PSKT 2 syringes/kit Inject 200 mg subcutaneously.      cyclobenzaprine (FLEXERIL) 10 MG tablet Take 10 mg by mouth.      diclofenac (VOLTAREN) 1 % topical gel APPLY 2-4 GRAM TO AFFECTED AREA OVER THE SKIN THREE TIMES DAILY FOR 30 DAYS.      folic acid (FOLVITE) 1 MG tablet       hydroxychloroquine (PLAQUENIL) 200 MG tablet Take 300 mg by mouth daily      predniSONE (DELTASONE) 2.5 MG tablet Take 2.5 mg by mouth daily For RA. When feeling a flare up, take 2 tabs (5mg) daily.      sertraline (ZOLOFT) 50 MG tablet Take 1 tablet (50 mg) by mouth daily 90 tablet 3     No current facility-administered medications for this visit.     No Known Allergies    Social History     Tobacco Use    Smoking status: Never    Smokeless tobacco: Not on file   Substance Use Topics    Alcohol use: Yes     Comment: rare       Review Of Systems  Ears/Nose/Throat: negative  Respiratory: No shortness of breath, dyspnea on exertion, cough, or hemoptysis  Cardiovascular: negative  Gastrointestinal: negative  Genitourinary: See HPI   Constitutional, HEENT, cardiovascular, pulmonary, GI, , musculoskeletal, neuro, skin, endocrine and  psych systems are negative, except as otherwise noted.    OBJECTIVE:  /60   Ht 1.524 m (5')   Wt 65.6 kg (144 lb 9.6 oz)   LMP 10/18/2024 (Exact Date)   BMI 28.24 kg/m    General appearance: healthy, alert, and no distress  Skin: Skin color, texture, turgor normal. No rashes or lesions.  Ears: negative  Nose/Sinuses: Nares normal. Septum midline. Mucosa normal. No drainage or sinus tenderness.  Oropharynx: Lips, mucosa, and tongue normal. Teeth and gums normal.  Neck: Neck supple. No adenopathy. Thyroid symmetric, normal size,, Carotids without bruits.  Lungs: negative, Percussion normal. Good diaphragmatic excursion. Lungs clear  Heart: negative, PMI normal. No lifts, heaves, or thrills. RRR. No murmurs, clicks gallops or rub      ASSESSMENT:  Adelita Bosch is an 34 year old   woman who presents for   gynecology consult for attempting pregnancy for 18 months.      Has RA- sjogrens, + SSA antibodies  History of stroke - occurring 2 years ago, with a small lesion noted on CT scan, which was an old stroke,   Is on baby aspirin   Stroke might be from a fall that she had or could be congenital.  She was seen by neurology.    She was seen in January with symptoms, but fell October of the year before     PLAN:  Dx:  1)  Secondary infertility -  test labs and reviewed pelvic us   Discussed clomid, femara, IUI, IVF  2)  hx of stroke, RA, sjogrens, and =SSA antibodies: [facility] pre-conception counseling     3)  worries about history of nexplanon for 7 years, wonders if this is delaying pregnancy.    Will have her check home OPK kits       Labs were reviewed in [employer]   Imaging was reviewed in Epic   Tests and documents were reviewed.   Discussion of management or test interpretation       Dr. Casie Haley, DO    Obstetrics and Gynecology  [facility] [place] [facility] [place]

## 2024-10-30 NOTE — NURSING NOTE
Chief Complaint   Patient presents with    Consult     Trying for pregnancy-it's been 1.5 years       Initial /60   Ht 1.524 m (5')   Wt 65.6 kg (144 lb 9.6 oz)   LMP 10/18/2024 (Exact Date)   BMI 28.24 kg/m   Estimated body mass index is 28.24 kg/m  as calculated from the following:    Height as of this encounter: 1.524 m (5').    Weight as of this encounter: 65.6 kg (144 lb 9.6 oz).  BP completed using cuff size: regular    Questioned patient about current smoking habits.  Pt. has never smoked.          The following HM Due: NONE

## 2024-10-30 NOTE — PATIENT INSTRUCTIONS
Labs today     Check OPK     Dr. Casie Haley, DO    Obstetrics and Gynecology  [facility] [place] [facility] [place]

## 2024-10-31 DIAGNOSIS — Z31.69 ENCOUNTER FOR PRECONCEPTION CONSULTATION: Primary | ICD-10-CM

## 2024-10-31 LAB
DHEA-S SERPL-MCNC: 284 UG/DL (ref 35–430)
ESTRADIOL SERPL-MCNC: 100 PG/ML
FSH SERPL IRP2-ACNC: 3.8 MIU/ML
LH SERPL-ACNC: 7.5 MIU/ML
MIS SERPL-MCNC: 1.4 NG/ML (ref 0.58–8.1)
PROGEST SERPL-MCNC: 0.1 NG/ML
TSH SERPL DL<=0.005 MIU/L-ACNC: 1.43 UIU/ML (ref 0.3–4.2)
VIT D+METAB SERPL-MCNC: 25 NG/ML (ref 20–50)

## 2024-11-01 LAB — TESTOST SERPL-MCNC: 22 NG/DL (ref 8–60)

## 2024-11-05 LAB — ANDROST SERPL-MCNC: 0.84 NG/ML

## 2025-03-03 ENCOUNTER — TELEPHONE (OUTPATIENT)
Dept: FAMILY MEDICINE | Facility: CLINIC | Age: 35
End: 2025-03-03
Payer: MEDICAID

## 2025-03-03 NOTE — CONFIDENTIAL NOTE
Patient Quality Outreach    Patient is due for the following:   IVD  -  Statin    Action(s) Taken:   No follow up needed at this time.    Type of outreach:    Sent [employer] message.    Questions for provider review:    None           Yash Anne MA

## 2025-03-10 ENCOUNTER — PATIENT OUTREACH (OUTPATIENT)
Dept: CARE COORDINATION | Facility: CLINIC | Age: 35
End: 2025-03-10
Payer: MEDICAID

## 2025-03-10 DIAGNOSIS — Z71.89 OTHER SPECIFIED COUNSELING: Primary | Chronic | ICD-10-CM

## 2025-03-10 NOTE — LETTER
[facility] CARE COORDINATION    March 10, 2025    Adelita Bosch  [address] [place] [address]      Dear Adelita,    I am a clinic community health worker who works with the [facility]. I wanted to introduce myself and provide you with my contact information for you to be able to call me with any questions or concerns. I wanted to thank you for spending the time to talk with me.      Below is a description of clinic care coordination and how I can further assist you.       The clinic care coordination team is made up of a registered nurse, , financial resource worker and community health worker who understand the health care system. The goal of clinic care coordination is to help you manage your health and improve access to the health care system. Our team works alongside your provider to assist you in determining your health and social needs. We can help you obtain health care and community resources, providing you with necessary information and education. We can work with you through any barriers and develop a care plan that helps coordinate and strengthen the communication between you and your care team.  Our services are voluntary and are offered without charge to you personally.    Please feel free to contact me with any questions or concerns regarding care coordination and what we can offer.      We are focused on providing you with the highest-quality healthcare experience possible.    Sincerely,     Courtney Sweeney  Community Health Worker   [email or web address]   Clinic Care Coordination / Ambulatory Care Management  [facility], and [facility]  [email or web address]  Office: [phone]  Gender Pronouns: She/her/hers  Employed by [employer]      Enclosed: Clinic Care Coordination Information           WHAT IS CARE COORDINATION?      [facility] Care Coordination supports patients and families  dealing with chronic or complex health conditions, developmental issues, and social service needs. This service is available to patients of all ages, from babies to seniors. When you're facing a difficult decision about caring for yourself or someone you love, we can help you understand your options. We identify and refer you to community resources that help with financial, legal, mental health, transportation, and other issues. We also help with your medical and related education needs.      IS CARE COORDINATION RIGHT FOR ME?      Discuss a referral to Care Coordination with your primary care provider or care team member.      HOW CAN I CONNECT WITH CARE COORDINATION?      Contact your clinic.   Speak with your doctor or clinic staff.   Discuss care coordination with hospital staff before discharge.         MEET YOUR CARE COORDINATION TEAM      Registered Nurse Care Coordinator      Provides education on medications, disease management, and new diagnoses.   Addresses concerns about medical conditions and connects you to resources.   Develops patient-centered goals in collaboration with your care team and community partners.      Social Work Care Coordinator      Supports you with mental health concerns and psychosocial needs.   Connects you to a variety of community-based resources.   Develops patient-centered goals in collaboration with your care team and community partners.      Community Health Worker      Identifies health and social barriers and connects you with community resources.   Develops nonclinical patient-centered goals in collaboration with your care team and community partners.   Enhances communication between patients and care teams.      Financial Resource Worker      Assists you with applying for county-based health insurance and other benefits.   Connects you with [facility].

## 2025-03-10 NOTE — PROGRESS NOTES
Clinic Care Coordination Contact  Community Health Worker Initial Outreach    CHW Initial Information Gathering:  Referral Source: Self-patient/Caregiver (Referred by cousin who is currently enrolled in CC Program at [facility])  Current living arrangement:: I live in a private home with family  Type of residence:: Other (Trailer home)  Supplies Currently Used at Home: Other (Injection for arthritis)  Informal Support system:: Family  No PCP office visit in Past Year: Yes  CHW Additional Questions  If ED/Hospital discharge, follow-up appointment scheduled as recommended?: N/A  Medication changes made following ED/Hospital discharge?: N/A  MyChart active?: Yes  Patient sent Social Drivers of Health questionnaire?: Yes    Patient accepts CC: No, requested support/resource provided and CC Program enrollment/ongoing support from CCC team not needed at this time. Patient will be sent Care Coordination introduction letter for future reference.     Patient referred to CHW by her cousin (MRN: [id number], Juliet Ugalde) who is currently enrolled in CC Program.     Requesting assistance with health insurance coverage as she needs weekly injection for arthritis. Patient's health insurance  2 years ago and she currently has no health insurance coverage. FRW referral entered for further assistance.     Patient is a Deferred Action for Childhood Arrivals (DACA) recipient. She needs to renew her DACA status by 2026. No immediate immigration legal concern/threat at this time.     Informed of [facility] services and how CCC team might be able to help besides her health insurance need. Discussed establishing care with a [facility] PCP at [place] or [facility] as she currently has no assigned PCP/PCP Clinic. Patient acknowledged her understanding.     No other medical or SDOH concern reported.     Patient has 2 minor children (7 and 11 years old) who are also seen within [facility] system.     Answered  patient's follow-up questions on CCC services including if CCC services are available to her children if needed.     Plan: No further outreach from CCC team at this time.     Courtney Sweeney  Community Health Worker   [email or web address]   Clinic Care Coordination / Ambulatory Care Management  [facility], and [facility]  [email or web address]  Office: [phone]  Gender Pronouns: She/her/hers  Employed by [employer]

## 2025-03-10 NOTE — Clinical Note
Ayo Rios and PARIS Colby initial outreach completed with this patient today, 3/10/25.  Patient's cousin who is enrolled in CC Program referred her to [facility].   CC Program closed as requested support/resource provided (FRW for health insurance assistance) and CC Program enrollment/ongoing support from [facility] team not needed at this time. CCC introduction letter also sent via [employer] today, 3/10/25.   Discussed needing to establish care with a [facility] PCP if further assistance is needed. Patient acknowledged her understanding.   No further outreach from CCC team at this time.   Thank you!  Courtney Sweeney  Community Health Worker  [email or web address]  Clinic Care Coordination / Ambulatory Care Management [facility], and [facility] [email or web address]  Office: [phone] Gender Pronouns: She/her/hers Employed by [employer]

## 2025-03-11 ENCOUNTER — PATIENT OUTREACH (OUTPATIENT)
Dept: CARE COORDINATION | Facility: CLINIC | Age: 35
End: 2025-03-11
Payer: MEDICAID

## 2025-03-18 ENCOUNTER — PRE VISIT (OUTPATIENT)
Dept: MATERNAL FETAL MEDICINE | Facility: CLINIC | Age: 35
End: 2025-03-18
Payer: MEDICAID

## 2025-03-24 ENCOUNTER — PATIENT OUTREACH (OUTPATIENT)
Dept: CARE COORDINATION | Facility: CLINIC | Age: 35
End: 2025-03-24
Payer: MEDICAID

## 2025-03-24 NOTE — PROGRESS NOTES
Frw update  3/24/25: pt called frw. Pt had questions on her health insurance renewal. FRW answered questions and pt will be mailing the renewal in.

## 2025-04-22 ENCOUNTER — PATIENT OUTREACH (OUTPATIENT)
Dept: CARE COORDINATION | Facility: CLINIC | Age: 35
End: 2025-04-22
Payer: MEDICAID